# Patient Record
Sex: MALE | Race: WHITE | Employment: OTHER | URBAN - METROPOLITAN AREA
[De-identification: names, ages, dates, MRNs, and addresses within clinical notes are randomized per-mention and may not be internally consistent; named-entity substitution may affect disease eponyms.]

---

## 2017-05-24 ENCOUNTER — OFFICE VISIT (OUTPATIENT)
Dept: CARDIOLOGY CLINIC | Age: 72
End: 2017-05-24

## 2017-05-24 VITALS
DIASTOLIC BLOOD PRESSURE: 52 MMHG | HEIGHT: 67 IN | BODY MASS INDEX: 32.65 KG/M2 | SYSTOLIC BLOOD PRESSURE: 120 MMHG | HEART RATE: 53 BPM | WEIGHT: 208 LBS

## 2017-05-24 DIAGNOSIS — E78.5 HYPERLIPIDEMIA, UNSPECIFIED HYPERLIPIDEMIA TYPE: ICD-10-CM

## 2017-05-24 DIAGNOSIS — G47.33 OSA (OBSTRUCTIVE SLEEP APNEA): ICD-10-CM

## 2017-05-24 DIAGNOSIS — I10 ESSENTIAL HYPERTENSION, BENIGN: ICD-10-CM

## 2017-05-24 DIAGNOSIS — I25.10 ATHEROSCLEROSIS OF NATIVE CORONARY ARTERY OF NATIVE HEART WITHOUT ANGINA PECTORIS: Primary | ICD-10-CM

## 2017-05-24 PROCEDURE — 3017F COLORECTAL CA SCREEN DOC REV: CPT | Performed by: INTERNAL MEDICINE

## 2017-05-24 PROCEDURE — G8417 CALC BMI ABV UP PARAM F/U: HCPCS | Performed by: INTERNAL MEDICINE

## 2017-05-24 PROCEDURE — 4040F PNEUMOC VAC/ADMIN/RCVD: CPT | Performed by: INTERNAL MEDICINE

## 2017-05-24 PROCEDURE — G8598 ASA/ANTIPLAT THER USED: HCPCS | Performed by: INTERNAL MEDICINE

## 2017-05-24 PROCEDURE — 1123F ACP DISCUSS/DSCN MKR DOCD: CPT | Performed by: INTERNAL MEDICINE

## 2017-05-24 PROCEDURE — 99214 OFFICE O/P EST MOD 30 MIN: CPT | Performed by: INTERNAL MEDICINE

## 2017-05-24 PROCEDURE — G8427 DOCREV CUR MEDS BY ELIG CLIN: HCPCS | Performed by: INTERNAL MEDICINE

## 2017-05-24 PROCEDURE — 1036F TOBACCO NON-USER: CPT | Performed by: INTERNAL MEDICINE

## 2017-05-24 RX ORDER — ATORVASTATIN CALCIUM 10 MG/1
10 TABLET, FILM COATED ORAL DAILY
Qty: 30 TABLET | Refills: 11 | Status: SHIPPED | OUTPATIENT
Start: 2017-05-24 | End: 2018-05-30 | Stop reason: SDUPTHER

## 2017-05-24 RX ORDER — TIZANIDINE 4 MG/1
4 TABLET ORAL EVERY 6 HOURS PRN
COMMUNITY
Start: 2017-03-17

## 2017-05-24 RX ORDER — FEXOFENADINE HCL 180 MG/1
180 TABLET ORAL DAILY
COMMUNITY

## 2017-08-04 RX ORDER — TERAZOSIN 1 MG/1
CAPSULE ORAL
Qty: 90 CAPSULE | Refills: 2 | Status: SHIPPED | OUTPATIENT
Start: 2017-08-04 | End: 2018-06-20 | Stop reason: SDUPTHER

## 2018-05-30 RX ORDER — ATORVASTATIN CALCIUM 10 MG/1
TABLET, FILM COATED ORAL
Qty: 30 TABLET | Refills: 1 | Status: SHIPPED | OUTPATIENT
Start: 2018-05-30 | End: 2018-06-28 | Stop reason: SDUPTHER

## 2018-06-22 RX ORDER — TERAZOSIN 1 MG/1
CAPSULE ORAL
Qty: 90 CAPSULE | Refills: 0 | Status: SHIPPED | OUTPATIENT
Start: 2018-06-22 | End: 2022-08-04

## 2018-06-28 ENCOUNTER — OFFICE VISIT (OUTPATIENT)
Dept: CARDIOLOGY CLINIC | Age: 73
End: 2018-06-28

## 2018-06-28 VITALS
BODY MASS INDEX: 34.21 KG/M2 | HEIGHT: 67 IN | SYSTOLIC BLOOD PRESSURE: 132 MMHG | WEIGHT: 218 LBS | HEART RATE: 56 BPM | DIASTOLIC BLOOD PRESSURE: 54 MMHG

## 2018-06-28 DIAGNOSIS — I25.10 ATHEROSCLEROSIS OF NATIVE CORONARY ARTERY OF NATIVE HEART WITHOUT ANGINA PECTORIS: Primary | ICD-10-CM

## 2018-06-28 DIAGNOSIS — E78.5 HYPERLIPIDEMIA, UNSPECIFIED HYPERLIPIDEMIA TYPE: ICD-10-CM

## 2018-06-28 DIAGNOSIS — G47.33 OSA (OBSTRUCTIVE SLEEP APNEA): ICD-10-CM

## 2018-06-28 DIAGNOSIS — I10 ESSENTIAL HYPERTENSION, BENIGN: ICD-10-CM

## 2018-06-28 PROCEDURE — G8598 ASA/ANTIPLAT THER USED: HCPCS | Performed by: INTERNAL MEDICINE

## 2018-06-28 PROCEDURE — 1036F TOBACCO NON-USER: CPT | Performed by: INTERNAL MEDICINE

## 2018-06-28 PROCEDURE — 1123F ACP DISCUSS/DSCN MKR DOCD: CPT | Performed by: INTERNAL MEDICINE

## 2018-06-28 PROCEDURE — 99214 OFFICE O/P EST MOD 30 MIN: CPT | Performed by: INTERNAL MEDICINE

## 2018-06-28 PROCEDURE — 4040F PNEUMOC VAC/ADMIN/RCVD: CPT | Performed by: INTERNAL MEDICINE

## 2018-06-28 PROCEDURE — 3017F COLORECTAL CA SCREEN DOC REV: CPT | Performed by: INTERNAL MEDICINE

## 2018-06-28 PROCEDURE — G8427 DOCREV CUR MEDS BY ELIG CLIN: HCPCS | Performed by: INTERNAL MEDICINE

## 2018-06-28 PROCEDURE — G8417 CALC BMI ABV UP PARAM F/U: HCPCS | Performed by: INTERNAL MEDICINE

## 2018-06-28 RX ORDER — ATORVASTATIN CALCIUM 10 MG/1
TABLET, FILM COATED ORAL
Qty: 90 TABLET | Refills: 3 | Status: SHIPPED | OUTPATIENT
Start: 2018-06-28 | End: 2019-06-28 | Stop reason: SDUPTHER

## 2019-06-28 ENCOUNTER — OFFICE VISIT (OUTPATIENT)
Dept: CARDIOLOGY CLINIC | Age: 74
End: 2019-06-28
Payer: MEDICARE

## 2019-06-28 VITALS
WEIGHT: 230 LBS | HEART RATE: 57 BPM | SYSTOLIC BLOOD PRESSURE: 136 MMHG | HEIGHT: 67 IN | DIASTOLIC BLOOD PRESSURE: 58 MMHG | OXYGEN SATURATION: 93 % | BODY MASS INDEX: 36.1 KG/M2 | RESPIRATION RATE: 18 BRPM

## 2019-06-28 DIAGNOSIS — G47.33 OSA (OBSTRUCTIVE SLEEP APNEA): ICD-10-CM

## 2019-06-28 DIAGNOSIS — E78.5 HYPERLIPIDEMIA, UNSPECIFIED HYPERLIPIDEMIA TYPE: ICD-10-CM

## 2019-06-28 DIAGNOSIS — I10 ESSENTIAL HYPERTENSION, BENIGN: ICD-10-CM

## 2019-06-28 DIAGNOSIS — I25.10 ATHEROSCLEROSIS OF NATIVE CORONARY ARTERY OF NATIVE HEART WITHOUT ANGINA PECTORIS: Primary | ICD-10-CM

## 2019-06-28 PROCEDURE — G8598 ASA/ANTIPLAT THER USED: HCPCS | Performed by: INTERNAL MEDICINE

## 2019-06-28 PROCEDURE — G8427 DOCREV CUR MEDS BY ELIG CLIN: HCPCS | Performed by: INTERNAL MEDICINE

## 2019-06-28 PROCEDURE — 4040F PNEUMOC VAC/ADMIN/RCVD: CPT | Performed by: INTERNAL MEDICINE

## 2019-06-28 PROCEDURE — 1036F TOBACCO NON-USER: CPT | Performed by: INTERNAL MEDICINE

## 2019-06-28 PROCEDURE — 99214 OFFICE O/P EST MOD 30 MIN: CPT | Performed by: INTERNAL MEDICINE

## 2019-06-28 PROCEDURE — 1123F ACP DISCUSS/DSCN MKR DOCD: CPT | Performed by: INTERNAL MEDICINE

## 2019-06-28 PROCEDURE — 3017F COLORECTAL CA SCREEN DOC REV: CPT | Performed by: INTERNAL MEDICINE

## 2019-06-28 PROCEDURE — G8417 CALC BMI ABV UP PARAM F/U: HCPCS | Performed by: INTERNAL MEDICINE

## 2019-06-28 RX ORDER — ATORVASTATIN CALCIUM 10 MG/1
TABLET, FILM COATED ORAL
Qty: 90 TABLET | Refills: 3 | Status: SHIPPED | OUTPATIENT
Start: 2019-06-28 | End: 2020-06-30 | Stop reason: SDUPTHER

## 2019-06-28 NOTE — LETTER
[address] [facility]  [address] [address] [facility] [address]  Phone: [phone]  Fax: [phone]    Jonathan Pearson MD        June 28, 2019     Gomez Lunsford MD  555 [facility], [facility] [address]  [place] [id number]    Patient: Marie Arnett  MR Number: [id number]  YOB: 1945  Date of Visit: 6/28/2019    Dear Dr. Gomez Lunsford:    [place]  Cardiac Follow Up     Referring Provider:  Gomez Lunsford MD     Chief Complaint   Patient presents with    1 Year Follow Up    Coronary Artery Disease    Hypertension        History of Present Illness:    Mr. Delia Lozano is seen today as a routine follow up for management of CAD, hypertension, hyperlipidemia. He also has ODALYS uses CPAP. PCP regularly. He does not smoke. He has increasing dyspnea with exertion. No associated chest pain. He continues to gain weight. Wt increased from 188 to 230 over 3 years. He complains of chronic pain. Says he no longer gets enough pain meds and thus cannot walk for exercise. See pain management. Past Medical History: has a past medical history of Acute MI ([address] Back pain, CAD (coronary artery disease), GERD (gastroesophageal reflux disease), Hyperlipidemia, Hypertension, and Sleep apnea. Surgical History: has a past surgical history that includes Cardiac surgery; back surgery; Hand surgery; Gallbladder surgery; Coronary artery bypass graft; and cyst removal (N/A, 1970). Social History: reports that he quit smoking about 19 years ago. He has never used smokeless tobacco. He reports that he does not drink alcohol or use drugs. Family History:family history includes Cancer in his sister; Heart Attack (age of onset: 72) in his father; Heart Disease in his father and mother; Kidney Disease in his mother. Home Medications:  Prior to Visit Medications    Medication Sig Taking?  Authorizing Provider atorvastatin (LIPITOR) 10 MG tablet TAKE 1 TABLET BY MOUTH ONE TIME A DAY Yes Brianne Portillo MD   terazosin (HYTRIN) 1 MG capsule TAKE 1 CAPSULE BY MOUTH  NIGHTLY Yes NEO Roe CNP   fexofenadine (ALLEGRA) 180 MG tablet Take 180 mg by mouth daily Yes Historical Provider, MD   Cholecalciferol (VITAMIN D3) 5000 UNITS TABS Take 1 tablet by mouth daily Yes Historical Provider, MD   metoprolol tartrate (LOPRESSOR) 25 MG tablet Take 0.5 tablets by mouth 2 times daily Yes NEO Roe CNP   amLODIPine (NORVASC) 10 MG tablet Take 1 tablet by mouth daily Yes Brianne Portillo MD   cloNIDine (CATAPRES) 0.1 MG tablet Take 1 tablet by mouth daily Yes Brianne Portillo MD   magnesium gluconate (MAGONATE) 500 MG tablet Take 2,000 mg by mouth daily  Yes Historical Provider, MD   lisinopril-hydrochlorothiazide (PRINZIDE;ZESTORETIC) 20-12.5 MG per tablet Take 2 tablets by mouth daily  Yes Historical Provider, MD   aspirin  MG EC tablet Take 325 mg by mouth daily  Yes Historical Provider, MD   oxyCODONE (OXY-IR) 30 MG immediate release tablet Take 20 mg by mouth 3 times daily. . Yes Historical Provider, MD   testosterone cypionate (DEPOTESTOTERONE CYPIONATE) 100 MG/ML injection Inject 50 mg into the muscle. Every 3 months. Yes Historical Provider, MD   fish oil-omega-3 fatty acids 1000 MG capsule Take 1 g by mouth 2 times daily. Yes Historical Provider, MD   Glucosamine-Chondroit-Biofl-Mn (GLUCOSAMINE CHONDROITIN COMPLX) CAPS Take 1 capsule by mouth daily. Yes Historical Provider, MD   Omeprazole 20 MG TBEC Take 20 mg by mouth daily. Yes Historical Provider, MD   Coenzyme Q10 (COQ10) 100 MG CAPS Take 200 mg by mouth 3 times daily Indications: 200-500 MG QD  Yes Historical Provider, MD   Multiple Vitamin (MULTIVITAMIN PO) Take 1 tablet by mouth daily.    Yes Historical Provider, MD   Cinnamon 500 MG CAPS Take 2 capsules by mouth as needed  Yes Historical Provider, MD celecoxib (CELEBREX) 200 MG capsule Take 200 mg by mouth Daily. Yes Historical Provider, MD   tiZANidine (ZANAFLEX) 4 MG tablet Take 4 mg by mouth every 6 hours as needed   Historical Provider, MD         Allergies:Carvedilol; Codeine; and Hydralazine     [x] Medications and dosages reviewed. ROS:  [x]Full ROS obtained and negative except as mentioned in HPI      Physical Examination:     /64 (Site: Left Upper Arm, Position: Sitting, Cuff Size: Large Adult)   Pulse 57   Resp 18   Ht 5' 7\" (1.702 m)   Wt 230 lb (104.3 kg)   SpO2 93%   BMI 36.02 kg/m²        · GENERAL: Well developed, well nourished, No acute distress  · NEUROLOGICAL: Alert and oriented  · PSYCH: Calm affect  · SKIN: Warm and dry, no visible rash  · HEENT: Normocephalic, Sclera non-icteric, mucus membranes moist  · NECK: supple, JVP normal  · CAROTID: Normal upstroke, no bruits  · CARDIAC: Normal PMI, Regular rate and rhythm, normal S1S2, No murmur, rub, or gallop  · RESPIRATORY: Normal respiratory effort, Clear to auscultation bilaterally  · EXTREMITIES: No LE edema  · MUSCULOSKELETAL: No joint swelling or tenderness, no chest wall tenderness  · GASTROINTESTINAL: normal bowel sounds, soft, non-tender, no bruit    Assessment:   1. Hyperlipidemia - LDL 73 on lipitor 10, well controlled. Same meds. 2. Hypertension, -Well controlled. Same meds /64 (Site: Left Upper Arm, Position: Sitting, Cuff Size: Large Adult)   Pulse 57   Resp 18   Ht 5' 7\" (1.702 m)   Wt 230 lb (104.3 kg)   SpO2 93%   BMI 36.02 kg/m²       3. Coronary Artery Disease -- Remains stable without angina. 2001 CABG. 2012 Stress myoview: unchanged from previous, Continue medical management   4.   ODALYS- stable, Uses CPAP nightly, Unchanged    Plan:  Stable cardiac status  Refill meds  F/u 1 year  Needs exercise-refuses cardiac rehab-can't afford    Thank you for allowing me to participate in the care of this individual.    Marilee Zapata M.D., [facility] Sincerely,        Sophie Randhawa MD

## 2019-06-28 NOTE — PROGRESS NOTES
tablets by mouth 2 times daily Yes Jyoti Mcnulty, APRN - CNP   amLODIPine (NORVASC) 10 MG tablet Take 1 tablet by mouth daily Yes Jamey Magana MD   cloNIDine (CATAPRES) 0.1 MG tablet Take 1 tablet by mouth daily Yes Jamey Magana MD   magnesium gluconate (MAGONATE) 500 MG tablet Take 2,000 mg by mouth daily  Yes Historical Provider, MD   lisinopril-hydrochlorothiazide (PRINZIDE;ZESTORETIC) 20-12.5 MG per tablet Take 2 tablets by mouth daily  Yes Historical Provider, MD   aspirin  MG EC tablet Take 325 mg by mouth daily  Yes Historical Provider, MD   oxyCODONE (OXY-IR) 30 MG immediate release tablet Take 20 mg by mouth 3 times daily. . Yes Historical Provider, MD   testosterone cypionate (DEPOTESTOTERONE CYPIONATE) 100 MG/ML injection Inject 50 mg into the muscle. Every 3 months. Yes Historical Provider, MD   fish oil-omega-3 fatty acids 1000 MG capsule Take 1 g by mouth 2 times daily. Yes Historical Provider, MD   Glucosamine-Chondroit-Biofl-Mn (GLUCOSAMINE CHONDROITIN COMPLX) CAPS Take 1 capsule by mouth daily. Yes Historical Provider, MD   Omeprazole 20 MG TBEC Take 20 mg by mouth daily. Yes Historical Provider, MD   Coenzyme Q10 (COQ10) 100 MG CAPS Take 200 mg by mouth 3 times daily Indications: 200-500 MG QD  Yes Historical Provider, MD   Multiple Vitamin (MULTIVITAMIN PO) Take 1 tablet by mouth daily. Yes Historical Provider, MD   Cinnamon 500 MG CAPS Take 2 capsules by mouth as needed  Yes Historical Provider, MD   celecoxib (CELEBREX) 200 MG capsule Take 200 mg by mouth Daily. Yes Historical Provider, MD   tiZANidine (ZANAFLEX) 4 MG tablet Take 4 mg by mouth every 6 hours as needed   Historical Provider, MD         Allergies:Carvedilol; Codeine; and Hydralazine     [x] Medications and dosages reviewed.     ROS:  [x]Full ROS obtained and negative except as mentioned in HPI      Physical Examination:     /64 (Site: Left Upper Arm, Position: Sitting, Cuff Size: Large Adult)   Pulse 57 Resp 18   Ht 5' 7\" (1.702 m)   Wt 230 lb (104.3 kg)   SpO2 93%   BMI 36.02 kg/m²       · GENERAL: Well developed, well nourished, No acute distress  · NEUROLOGICAL: Alert and oriented  · PSYCH: Calm affect  · SKIN: Warm and dry, no visible rash  · HEENT: Normocephalic, Sclera non-icteric, mucus membranes moist  · NECK: supple, JVP normal  · CAROTID: Normal upstroke, no bruits  · CARDIAC: Normal PMI, Regular rate and rhythm, normal S1S2, No murmur, rub, or gallop  · RESPIRATORY: Normal respiratory effort, Clear to auscultation bilaterally  · EXTREMITIES: No LE edema  · MUSCULOSKELETAL: No joint swelling or tenderness, no chest wall tenderness  · GASTROINTESTINAL: normal bowel sounds, soft, non-tender, no bruit    Assessment:   1. Hyperlipidemia - LDL 73 on lipitor 10, well controlled. Same meds. 2. Hypertension, -Well controlled. Same meds /64 (Site: Left Upper Arm, Position: Sitting, Cuff Size: Large Adult)   Pulse 57   Resp 18   Ht 5' 7\" (1.702 m)   Wt 230 lb (104.3 kg)   SpO2 93%   BMI 36.02 kg/m²      3. Coronary Artery Disease -- Remains stable without angina. 2001 CABG. 2012 Stress myoview: unchanged from previous, Continue medical management   4.   ODALYS- stable, Uses CPAP nightly, Unchanged    Plan:  Stable cardiac status  Refill meds  F/u 1 year  Needs exercise-refuses cardiac rehab-can't afford    Thank you for allowing me to participate in the care of this individual.    Devora Medellin M.D., [facility]

## 2020-05-05 ENCOUNTER — HOSPITAL ENCOUNTER (OUTPATIENT)
Age: 75
Discharge: HOME | End: 2020-05-05
Payer: MEDICARE

## 2020-05-05 LAB — POTASSIUM SERPL-SCNC: 5.6 MMOL/L (ref 3.5–5.1)

## 2020-05-05 PROCEDURE — 36415 COLL VENOUS BLD VENIPUNCTURE: CPT

## 2020-05-05 PROCEDURE — 84132 ASSAY OF SERUM POTASSIUM: CPT

## 2020-06-04 ENCOUNTER — TELEPHONE (OUTPATIENT)
Dept: CARDIOLOGY CLINIC | Age: 75
End: 2020-06-04

## 2020-06-10 ENCOUNTER — OFFICE VISIT (OUTPATIENT)
Dept: CARDIOLOGY CLINIC | Age: 75
End: 2020-06-10
Payer: MEDICARE

## 2020-06-10 VITALS
SYSTOLIC BLOOD PRESSURE: 138 MMHG | BODY MASS INDEX: 35.85 KG/M2 | HEART RATE: 64 BPM | WEIGHT: 228.4 LBS | DIASTOLIC BLOOD PRESSURE: 58 MMHG | OXYGEN SATURATION: 96 % | HEIGHT: 67 IN | TEMPERATURE: 98.8 F | RESPIRATION RATE: 18 BRPM

## 2020-06-10 PROCEDURE — G8427 DOCREV CUR MEDS BY ELIG CLIN: HCPCS | Performed by: INTERNAL MEDICINE

## 2020-06-10 PROCEDURE — 99214 OFFICE O/P EST MOD 30 MIN: CPT | Performed by: INTERNAL MEDICINE

## 2020-06-10 PROCEDURE — 1036F TOBACCO NON-USER: CPT | Performed by: INTERNAL MEDICINE

## 2020-06-10 PROCEDURE — 3017F COLORECTAL CA SCREEN DOC REV: CPT | Performed by: INTERNAL MEDICINE

## 2020-06-10 PROCEDURE — 4040F PNEUMOC VAC/ADMIN/RCVD: CPT | Performed by: INTERNAL MEDICINE

## 2020-06-10 PROCEDURE — 1123F ACP DISCUSS/DSCN MKR DOCD: CPT | Performed by: INTERNAL MEDICINE

## 2020-06-10 PROCEDURE — G8417 CALC BMI ABV UP PARAM F/U: HCPCS | Performed by: INTERNAL MEDICINE

## 2020-06-10 RX ORDER — ATORVASTATIN CALCIUM 10 MG/1
TABLET, FILM COATED ORAL
Qty: 90 TABLET | Refills: 3 | Status: CANCELLED | OUTPATIENT
Start: 2020-06-10

## 2020-06-10 NOTE — PATIENT INSTRUCTIONS
1.  No change in medication  2. Renée Clement soon  3. Call office with any concerning symptoms  4. Follow up in six months (if stress test is normal)  5. Have Dr. Shania Zarco check potassium level at next visit in July 2020.

## 2020-06-10 NOTE — PROGRESS NOTES
mouth daily Yes Historical Provider, MD   metoprolol tartrate (LOPRESSOR) 25 MG tablet Take 0.5 tablets by mouth 2 times daily Yes Laren Councilman, APRN - CNP   amLODIPine (NORVASC) 10 MG tablet Take 1 tablet by mouth daily Yes Silviano Valderrama MD   cloNIDine (CATAPRES) 0.1 MG tablet Take 1 tablet by mouth daily Yes Silviano Valderrama MD   magnesium gluconate (MAGONATE) 500 MG tablet Take 2,000 mg by mouth daily  Yes Historical Provider, MD   lisinopril-hydrochlorothiazide (PRINZIDE;ZESTORETIC) 20-12.5 MG per tablet Take 2 tablets by mouth daily  Yes Historical Provider, MD   aspirin  MG EC tablet Take 325 mg by mouth daily  Yes Historical Provider, MD   oxyCODONE (OXY-IR) 30 MG immediate release tablet Take 20 mg by mouth 3 times daily. . Yes Historical Provider, MD   testosterone cypionate (DEPOTESTOTERONE CYPIONATE) 100 MG/ML injection Inject 50 mg into the muscle. Every 3 months. Yes Historical Provider, MD   fish oil-omega-3 fatty acids 1000 MG capsule Take 1 g by mouth 2 times daily. Yes Historical Provider, MD   Glucosamine-Chondroit-Biofl-Mn (GLUCOSAMINE CHONDROITIN COMPLX) CAPS Take 1 capsule by mouth daily. Yes Historical Provider, MD   Omeprazole 20 MG TBEC Take 20 mg by mouth daily. Yes Historical Provider, MD   Coenzyme Q10 (COQ10) 100 MG CAPS Take 200 mg by mouth 3 times daily Indications: 200-500 MG QD  Yes Historical Provider, MD   Multiple Vitamin (MULTIVITAMIN PO) Take 1 tablet by mouth daily. Yes Historical Provider, MD   Cinnamon 500 MG CAPS Take 2 capsules by mouth as needed  Yes Historical Provider, MD   celecoxib (CELEBREX) 200 MG capsule Take 200 mg by mouth Daily. Yes Historical Provider, MD   fexofenadine (ALLEGRA) 180 MG tablet Take 180 mg by mouth daily  Historical Provider, MD         Allergies:Carvedilol; Codeine; and Hydralazine     [x] Medications and dosages reviewed.     ROS:  [x]Full ROS obtained and negative except as mentioned in HPI      Physical Examination:     BP (!) 138/58 (Site: Left Upper Arm, Position: Sitting, Cuff Size: Large Adult)   Pulse 64   Temp 98.8 °F (37.1 °C)   Resp 18   Ht 5' 7\" (1.702 m)   Wt 228 lb 6.4 oz (103.6 kg)   SpO2 96%   BMI 35.77 kg/m²       · GENERAL: Well developed, well nourished, No acute distress  · NEUROLOGICAL: Alert and oriented  · PSYCH: Calm affect  · SKIN: Warm and dry, no visible rash  · HEENT: Normocephalic, Sclera non-icteric, mucus membranes moist  · NECK: supple, JVP normal  · CAROTID: Normal upstroke, no bruits  · CARDIAC: Normal PMI, regular rate and rhythm, normal S1S2, no murmur, rub, or gallop  · RESPIRATORY: Normal respiratory effort, Clear to auscultation bilaterally  · EXTREMITIES: No LE edema  · MUSCULOSKELETAL: No joint swelling or tenderness, no chest wall tenderness  · GASTROINTESTINAL: normal bowel sounds, soft, non-tender, no bruit    Assessment:   1. Hyperlipidemia - LDL 75 on lipitor 10, well controlled. Same meds. 2. Hypertension, -Well controlled. Continue current therapy BP (!) 138/58 (Site: Left Upper Arm, Position: Sitting, Cuff Size: Large Adult)   Pulse 64   Temp 98.8 °F (37.1 °C)   Resp 18   Ht 5' 7\" (1.702 m)   Wt 228 lb 6.4 oz (103.6 kg)   SpO2 96%   BMI 35.77 kg/m²      3. Coronary Artery Disease -- 2001 CABG. 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 Inferior HK. Needs repeat prior to surgery   4. ODALYS- stable, Uses CPAP nightly, Unchanged  5. Preop: Needs stress as last 2012-Lexiscan myoview. If unchanged may proceed with moderate risk  6. Hyperkalemia: followed by Dr. Bubba Fraser.  If persists would decreaselisinopril    Plan:  Stress myoview for surgical risk  Repeat K with PCP  F/u me 6 months if stress stable    Thank you for allowing me to participate in the care of this individual.    Julee Hernandez M.D., [facility] [place]

## 2020-06-10 NOTE — LETTER
[facility]  [address] [place] [address]  Phone: [phone]  Fax: [phone]    Kobe Berger MD        Theresa 10, 2020     Twyla Sandoval, [id number] [facility], [address] [place] [address] [place] [address]    Patient: Nikko Shelton  MR Number: [id number]  YOB: 1945  Date of Visit: 6/10/2020    Dear Dr. Twyla Sandoval:    Below are the relevant portions of my assessment and plan of care. [address]  Cardiac Follow Up     Referring Provider:  Twyla Sandoval MD     Chief Complaint   Patient presents with    1 Year Follow Up     No Complaints     Coronary Artery Disease    Hypertension        History of Present Illness:    Mr. Sayra Deleon is seen today for preoperative risk evaluation and follow up for management of CAD, hypertension, hyperlipidemia. He also has ODALYS uses CPAP. He has an increasing PSA and needs biopsy under anesthesia. He is very inactive due to severe chronic pain. Chronic dyspnea with exertion resolves with rest. No associated chest pain. He has recently been found to have issues with hyperkalemia. He was taking OTC med for elevated PSA. Last K better at 5.1. To have recheck with PCP per pt. Past Medical History: has a past medical history of Acute MI ([address] Back pain, CAD (coronary artery disease), GERD (gastroesophageal reflux disease), Hyperlipidemia, Hypertension, and Sleep apnea. Surgical History: has a past surgical history that includes Cardiac surgery; back surgery; Hand surgery; Gallbladder surgery; Coronary artery bypass graft; and cyst removal (N/A, 1970). Social History: reports that he quit smoking about 20 years ago. He has never used smokeless tobacco. He reports that he does not drink alcohol or use drugs. Family History:family history includes Cancer in his sister; Heart Attack (age of onset: 72) in his father; Heart Disease in his father and mother; Kidney Disease in his mother. Home Medications:  Prior to Visit Medications    Medication Sig Taking? Authorizing Provider   atorvastatin (LIPITOR) 10 MG tablet TAKE 1 TABLET BY MOUTH ONE TIME A DAY Yes Ashish Beltran MD   terazosin (HYTRIN) 1 MG capsule TAKE 1 CAPSULE BY MOUTH  NIGHTLY Yes NEO Johnson CNP   tiZANidine (ZANAFLEX) 4 MG tablet Take 4 mg by mouth every 6 hours as needed  Yes Historical Provider, MD   Cholecalciferol (VITAMIN D3) 5000 UNITS TABS Take 1 tablet by mouth daily Yes Historical Provider, MD   metoprolol tartrate (LOPRESSOR) 25 MG tablet Take 0.5 tablets by mouth 2 times daily Yes NEO Johnson CNP   amLODIPine (NORVASC) 10 MG tablet Take 1 tablet by mouth daily Yes Ashish Beltran MD   cloNIDine (CATAPRES) 0.1 MG tablet Take 1 tablet by mouth daily Yes Ashish Beltran MD   magnesium gluconate (MAGONATE) 500 MG tablet Take 2,000 mg by mouth daily  Yes Historical Provider, MD   lisinopril-hydrochlorothiazide (PRINZIDE;ZESTORETIC) 20-12.5 MG per tablet Take 2 tablets by mouth daily  Yes Historical Provider, MD   aspirin  MG EC tablet Take 325 mg by mouth daily  Yes Historical Provider, MD   oxyCODONE (OXY-IR) 30 MG immediate release tablet Take 20 mg by mouth 3 times daily. . Yes Historical Provider, MD   testosterone cypionate (DEPOTESTOTERONE CYPIONATE) 100 MG/ML injection Inject 50 mg into the muscle. Every 3 months. Yes Historical Provider, MD   fish oil-omega-3 fatty acids 1000 MG capsule Take 1 g by mouth 2 times daily. Yes Historical Provider, MD   Glucosamine-Chondroit-Biofl-Mn (GLUCOSAMINE CHONDROITIN COMPLX) CAPS Take 1 capsule by mouth daily. Yes Historical Provider, MD   Omeprazole 20 MG TBEC Take 20 mg by mouth daily. Yes Historical Provider, MD   Coenzyme Q10 (COQ10) 100 MG CAPS Take 200 mg by mouth 3 times daily Indications: 200-500 MG QD  Yes Historical Provider, MD   Multiple Vitamin (MULTIVITAMIN PO) Take 1 tablet by mouth daily.    Yes Historical Provider, MD Cinnamon 500 MG CAPS Take 2 capsules by mouth as needed  Yes Historical Provider, MD   celecoxib (CELEBREX) 200 MG capsule Take 200 mg by mouth Daily. Yes Historical Provider, MD   fexofenadine (ALLEGRA) 180 MG tablet Take 180 mg by mouth daily  Historical Provider, MD         Allergies:Carvedilol; Codeine; and Hydralazine     [x] Medications and dosages reviewed. ROS:  [x]Full ROS obtained and negative except as mentioned in HPI      Physical Examination:     BP (!) 138/58 (Site: Left Upper Arm, Position: Sitting, Cuff Size: Large Adult)   Pulse 64   Temp 98.8 °F (37.1 °C)   Resp 18   Ht 5' 7\" (1.702 m)   Wt 228 lb 6.4 oz (103.6 kg)   SpO2 96%   BMI 35.77 kg/m²        · GENERAL: Well developed, well nourished, No acute distress  · NEUROLOGICAL: Alert and oriented  · PSYCH: Calm affect  · SKIN: Warm and dry, no visible rash  · HEENT: Normocephalic, Sclera non-icteric, mucus membranes moist  · NECK: supple, JVP normal  · CAROTID: Normal upstroke, no bruits  · CARDIAC: Normal PMI, regular rate and rhythm, normal S1S2, no murmur, rub, or gallop  · RESPIRATORY: Normal respiratory effort, Clear to auscultation bilaterally  · EXTREMITIES: No LE edema  · MUSCULOSKELETAL: No joint swelling or tenderness, no chest wall tenderness  · GASTROINTESTINAL: normal bowel sounds, soft, non-tender, no bruit    Assessment:   1. Hyperlipidemia - LDL 75 on lipitor 10, well controlled. Same meds. 2. Hypertension, -Well controlled. Continue current therapy BP (!) 138/58 (Site: Left Upper Arm, Position: Sitting, Cuff Size: Large Adult)   Pulse 64   Temp 98.8 °F (37.1 °C)   Resp 18   Ht 5' 7\" (1.702 m)   Wt 228 lb 6.4 oz (103.6 kg)   SpO2 96%   BMI 35.77 kg/m²       3. Coronary Artery Disease -- 2001 CABG. 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 Inferior HK. Needs repeat prior to surgery   4.   ODALYS- stable, Uses CPAP nightly, Unchanged

## 2020-06-12 ENCOUNTER — HOSPITAL ENCOUNTER (OUTPATIENT)
Dept: NON INVASIVE DIAGNOSTICS | Age: 75
Discharge: HOME | End: 2020-06-12
Payer: MEDICARE

## 2020-06-12 ENCOUNTER — TELEPHONE (OUTPATIENT)
Dept: CARDIOLOGY CLINIC | Age: 75
End: 2020-06-12

## 2020-06-12 LAB
LV EF: 54 %
LVEF MODALITY: NORMAL

## 2020-06-12 PROCEDURE — A9502 TC99M TETROFOSMIN: HCPCS | Performed by: INTERNAL MEDICINE

## 2020-06-12 PROCEDURE — 78452 HT MUSCLE IMAGE SPECT MULT: CPT

## 2020-06-12 PROCEDURE — 3430000000 HC RX DIAGNOSTIC RADIOPHARMACEUTICAL: Performed by: INTERNAL MEDICINE

## 2020-06-12 PROCEDURE — 93017 CV STRESS TEST TRACING ONLY: CPT

## 2020-06-12 PROCEDURE — 2580000003 HC RX 258: Performed by: INTERNAL MEDICINE

## 2020-06-12 PROCEDURE — 6360000002 HC RX W HCPCS: Performed by: INTERNAL MEDICINE

## 2020-06-12 RX ORDER — SODIUM CHLORIDE 0.9 % (FLUSH) 0.9 %
10 SYRINGE (ML) INJECTION PRN
Status: DISCONTINUED | OUTPATIENT
Start: 2020-06-12 | End: 2020-06-13 | Stop reason: HOSPADM

## 2020-06-12 RX ADMIN — TETROFOSMIN 30 MILLICURIE: 1.38 INJECTION, POWDER, LYOPHILIZED, FOR SOLUTION INTRAVENOUS at 14:00

## 2020-06-12 RX ADMIN — REGADENOSON 0.4 MG: 0.08 INJECTION, SOLUTION INTRAVENOUS at 14:18

## 2020-06-12 RX ADMIN — TETROFOSMIN 10 MILLICURIE: 1.38 INJECTION, POWDER, LYOPHILIZED, FOR SOLUTION INTRAVENOUS at 12:43

## 2020-06-12 RX ADMIN — Medication 10 ML: at 14:00

## 2020-06-12 RX ADMIN — Medication 10 ML: at 12:54

## 2020-06-15 ENCOUNTER — TELEPHONE (OUTPATIENT)
Dept: CARDIOLOGY CLINIC | Age: 75
End: 2020-06-15

## 2020-06-22 ENCOUNTER — TELEPHONE (OUTPATIENT)
Dept: CARDIOLOGY CLINIC | Age: 75
End: 2020-06-22

## 2020-06-22 NOTE — TELEPHONE ENCOUNTER
Dr Sharma Situ calling for [facility] (Knows he is OOT) not urgent. Pt has low grade prostate cancer however pt may proceed with cath from his standpoint. May treat like any other pt. He will be faxing over the pathology report and summary of visit.  Pls call to advise Thank you

## 2020-06-30 ENCOUNTER — HOSPITAL ENCOUNTER (OUTPATIENT)
Age: 75
Discharge: HOME | End: 2020-06-30
Payer: MEDICARE

## 2020-06-30 ENCOUNTER — OFFICE VISIT (OUTPATIENT)
Dept: CARDIOLOGY CLINIC | Age: 75
End: 2020-06-30
Payer: MEDICARE

## 2020-06-30 VITALS
WEIGHT: 224 LBS | HEART RATE: 62 BPM | BODY MASS INDEX: 35.16 KG/M2 | OXYGEN SATURATION: 94 % | DIASTOLIC BLOOD PRESSURE: 60 MMHG | HEIGHT: 67 IN | SYSTOLIC BLOOD PRESSURE: 152 MMHG

## 2020-06-30 PROBLEM — E87.5 HYPERKALEMIA: Status: ACTIVE | Noted: 2020-06-30

## 2020-06-30 LAB — POTASSIUM SERPL-SCNC: 5.5 MMOL/L (ref 3.5–5.1)

## 2020-06-30 PROCEDURE — 84132 ASSAY OF SERUM POTASSIUM: CPT

## 2020-06-30 PROCEDURE — 1123F ACP DISCUSS/DSCN MKR DOCD: CPT | Performed by: INTERNAL MEDICINE

## 2020-06-30 PROCEDURE — G8417 CALC BMI ABV UP PARAM F/U: HCPCS | Performed by: INTERNAL MEDICINE

## 2020-06-30 PROCEDURE — 4040F PNEUMOC VAC/ADMIN/RCVD: CPT | Performed by: INTERNAL MEDICINE

## 2020-06-30 PROCEDURE — 36415 COLL VENOUS BLD VENIPUNCTURE: CPT

## 2020-06-30 PROCEDURE — 1036F TOBACCO NON-USER: CPT | Performed by: INTERNAL MEDICINE

## 2020-06-30 PROCEDURE — 3017F COLORECTAL CA SCREEN DOC REV: CPT | Performed by: INTERNAL MEDICINE

## 2020-06-30 PROCEDURE — 99215 OFFICE O/P EST HI 40 MIN: CPT | Performed by: INTERNAL MEDICINE

## 2020-06-30 PROCEDURE — G8427 DOCREV CUR MEDS BY ELIG CLIN: HCPCS | Performed by: INTERNAL MEDICINE

## 2020-06-30 RX ORDER — ATORVASTATIN CALCIUM 10 MG/1
TABLET, FILM COATED ORAL
Qty: 90 TABLET | Refills: 4 | Status: SHIPPED | OUTPATIENT
Start: 2020-06-30 | End: 2021-10-01 | Stop reason: SDUPTHER

## 2020-07-02 ENCOUNTER — TELEPHONE (OUTPATIENT)
Dept: CARDIOLOGY CLINIC | Age: 75
End: 2020-07-02

## 2020-07-02 ENCOUNTER — OFFICE VISIT (OUTPATIENT)
Dept: PRIMARY CARE CLINIC | Age: 75
End: 2020-07-02
Payer: MEDICARE

## 2020-07-02 PROCEDURE — G8417 CALC BMI ABV UP PARAM F/U: HCPCS | Performed by: NURSE PRACTITIONER

## 2020-07-02 PROCEDURE — 99211 OFF/OP EST MAY X REQ PHY/QHP: CPT | Performed by: NURSE PRACTITIONER

## 2020-07-02 PROCEDURE — G8428 CUR MEDS NOT DOCUMENT: HCPCS | Performed by: NURSE PRACTITIONER

## 2020-07-02 NOTE — PATIENT INSTRUCTIONS
Steps to help prevent the spread of COVID-19 if you are sick  SOURCE - https://parker-ingram.info/. html     Stay home except to get medical care   ; Stay home: People who are mildly ill with COVID-19 are able to isolate at home during their illness. You should restrict activities outside your home, except for getting medical care.   ; Avoid public areas: Do not go to work, school, or public areas.   ; Avoid public transportation: Avoid using public transportation, ride-sharing, or taxis.  ; Separate yourself from other people and animals in your home   ; Stay away from others: As much as possible, you should stay in a specific room and away from other people in your home. Also, you should use a separate bathroom, if available.   ; Limit contact with pets & animals: You should restrict contact with pets and other animals while you are sick with COVID-19, just like you would around other people. Although there have not been reports of pets or other animals becoming sick with COVID-19, it is still recommended that people sick with COVID-19 limit contact with animals until more information is known about the virus. ; When possible, have another member of your household care for your animals while you are sick. If you are sick with COVID-19, avoid contact with your pet, including petting, snuggling, being kissed or licked, and sharing food. If you must care for your pet or be around animals while you are sick, wash your hands before and after you interact with pets and wear a facemask. See COVID-19 and Animals for more information. Other considerations   The ill person should eat/be fed in their room if possible. Non-disposable  items used should be handled with gloves and washed with hot water or in a . Clean hands after handling used  items.  If possible, dedicate a lined trash can for the ill person.  Use gloves when removing garbage hands are visibly dirty.   ; Avoid touching: Avoid touching your eyes, nose, and mouth with unwashed hands. Handwashing Tips   ; Wet your hands with clean, running water (warm or cold), turn off the tap, and apply soap.  ; Lather your hands by rubbing them together with the soap. Lather the backs of your hands, between your fingers, and under your nails. ; Scrub your hands for at least 20 seconds. Need a timer? Hum the [place] from beginning to end twice.  ; Rinse your hands well under clean, running water.  ; Dry your hands using a clean towel or air dry them. Avoid sharing personal household items   ; Do not share: You should not share dishes, drinking glasses, cups, eating utensils, towels, or bedding with other people or pets in your home.   ; Wash thoroughly after use: After using these items, they should be washed thoroughly with soap and water. Clean all high-touch surfaces everyday   ; Clean and disinfect: Practice routine cleaning of high touch surfaces.  ; High touch surfaces include counters, tabletops, doorknobs, bathroom fixtures, toilets, phones, keyboards, tablets, and bedside tables.  ; Disinfect areas with bodily fluids: Also, clean any surfaces that may have blood, stool, or body fluids on them.   ; Household : Use a household cleaning spray or wipe, according to the label instructions. Labels contain instructions for safe and effective use of the cleaning product including precautions you should take when applying the product, such as wearing gloves and making sure you have good ventilation during use of the product.     Monitor your symptoms   Seek medical attention: Seek prompt medical attention if your illness is worsening     (e.g., difficulty breathing).   ; Call your doctor: Before seeking care, call your healthcare provider and tell them that you have, or are being evaluated for, COVID-19.   ; Wear a facemask when sick: Put on a facemask before you enter the facility. These steps will help the healthcare provider's office to keep other people in the office or waiting room from getting infected or exposed. ; Alert health department: Ask your healthcare provider to call the local or state health department. Persons who are placed under active monitoring or facilitated self-monitoring should follow instructions provided by their local health department or occupational health professionals, as appropriate.  ; Call 911 if you have a medical emergency: If you have a medical emergency and need to call 911, notify the dispatch personnel that you have, or are being evaluated for COVID-19. If possible, put on a facemask before emergency medical services arrive. Thank you for enrolling in [address] [facility]. Please follow the instructions below to securely access your online medical record. [facility] allows you to send messages to your doctor, view your test results, renew your prescriptions, schedule appointments, and more. How Do I Sign Up? 1. In your Internet browser, go to [email or web address]  2. Click on the Sign Up Now link in the Sign In box. You will see the New Member Sign Up page. 3. Enter your [employer] Access Code exactly as it appears below. You will not need to use this code after youve completed the sign-up process. If you do not sign up before the expiration date, you must request a new code. MyChart Access Code: Activation code not generated  Current [facility] Status: Patient Declined    4. Enter your Social Security Number ([id number]) and Date of Birth (mm/dd/yyyy) as indicated and click Submit. You will be taken to the next sign-up page. 5. Create a [employer] ID. This will be your [facility] login ID and cannot be changed, so think of one that is secure and easy to remember. 6. Create a [employer] password. You can change your password at any time. 7. Enter your Password Reset Question and Answer.  This can be used at a later time if you forget your password. 8. Enter your e-mail address. You will receive e-mail notification when new information is available in [address] 9. Click Sign Up. You can now view your medical record. Additional Information  If you have questions, please contact your physician practice where you receive care. Remember, MyChart is NOT to be used for urgent needs. For medical emergencies, dial 911.

## 2020-07-02 NOTE — TELEPHONE ENCOUNTER
Spoke to patient about [facility] 7/8/20. He mentioned that his potassium level has been high and Dr Quiros [place] ordered him kayexelate to take. He doesn't understand why. He has been on same meds and has eaten same foods for years. Discussed high potassium foods to stay away from. He is wanting to know what MMK thinks the cause of this all of a sudden is.  Reviewed old labs with him that showed even 2 years ago his potassium was always at the upper limit of normal.

## 2020-07-05 LAB
SARS-COV-2: NOT DETECTED
SOURCE: NORMAL

## 2020-07-06 NOTE — TELEPHONE ENCOUNTER
Per MMK- Hyperkalemia may be from Lisinopril. We could consider increasing the HCTZ to 25 mg. Wouldn't want him to start that until after his [facility] procedure Wed. LMOM for patient to call back to discuss.

## 2020-07-07 NOTE — TELEPHONE ENCOUNTER
Noted. 1 [facility] will be doing [facility] tomorrow and will discuss with him.

## 2020-07-08 ENCOUNTER — HOSPITAL ENCOUNTER (OUTPATIENT)
Dept: CARDIAC CATH/INVASIVE PROCEDURES | Age: 75
Discharge: HOME | End: 2020-07-08
Attending: INTERNAL MEDICINE | Admitting: INTERNAL MEDICINE
Payer: MEDICARE

## 2020-07-08 VITALS
HEART RATE: 64 BPM | BODY MASS INDEX: 35.16 KG/M2 | OXYGEN SATURATION: 99 % | DIASTOLIC BLOOD PRESSURE: 63 MMHG | WEIGHT: 224 LBS | TEMPERATURE: 98 F | HEIGHT: 67 IN | SYSTOLIC BLOOD PRESSURE: 175 MMHG

## 2020-07-08 LAB
ANION GAP SERPL CALCULATED.3IONS-SCNC: 11 MMOL/L (ref 3–16)
BUN BLDV-MCNC: 25 MG/DL (ref 7–20)
CALCIUM SERPL-MCNC: 9.5 MG/DL (ref 8.3–10.6)
CHLORIDE BLD-SCNC: 103 MMOL/L (ref 99–110)
CO2: 27 MMOL/L (ref 21–32)
CREAT SERPL-MCNC: 1 MG/DL (ref 0.8–1.3)
EKG ATRIAL RATE: 64 BPM
EKG DIAGNOSIS: NORMAL
EKG P AXIS: 61 DEGREES
EKG P-R INTERVAL: 152 MS
EKG Q-T INTERVAL: 402 MS
EKG QRS DURATION: 86 MS
EKG QTC CALCULATION (BAZETT): 414 MS
EKG R AXIS: 29 DEGREES
EKG T AXIS: 74 DEGREES
EKG VENTRICULAR RATE: 64 BPM
GFR AFRICAN AMERICAN: >60
GFR NON-AFRICAN AMERICAN: >60
GLUCOSE BLD-MCNC: 143 MG/DL (ref 70–99)
HCT VFR BLD CALC: 40.5 % (ref 40.5–52.5)
HEMOGLOBIN: 13.6 G/DL (ref 13.5–17.5)
LEFT VENTRICULAR EJECTION FRACTION MODE: NORMAL
LV EF: 50 %
MCH RBC QN AUTO: 30.7 PG (ref 26–34)
MCHC RBC AUTO-ENTMCNC: 33.6 G/DL (ref 31–36)
MCV RBC AUTO: 91.3 FL (ref 80–100)
PDW BLD-RTO: 13.8 % (ref 12.4–15.4)
PLATELET # BLD: 174 K/UL (ref 135–450)
PMV BLD AUTO: 8.1 FL (ref 5–10.5)
POTASSIUM SERPL-SCNC: 4.3 MMOL/L (ref 3.5–5.1)
RBC # BLD: 4.44 M/UL (ref 4.2–5.9)
SODIUM BLD-SCNC: 141 MMOL/L (ref 136–145)
WBC # BLD: 6 K/UL (ref 4–11)

## 2020-07-08 PROCEDURE — 93005 ELECTROCARDIOGRAM TRACING: CPT

## 2020-07-08 PROCEDURE — 85027 COMPLETE CBC AUTOMATED: CPT

## 2020-07-08 PROCEDURE — 80048 BASIC METABOLIC PNL TOTAL CA: CPT

## 2020-07-08 PROCEDURE — 93005 ELECTROCARDIOGRAM TRACING: CPT | Performed by: INTERNAL MEDICINE

## 2020-07-08 PROCEDURE — 93459 L HRT ART/GRFT ANGIO: CPT

## 2020-07-08 PROCEDURE — 99152 MOD SED SAME PHYS/QHP 5/>YRS: CPT

## 2020-07-08 PROCEDURE — 6360000002 HC RX W HCPCS

## 2020-07-08 PROCEDURE — 2709999900 HC NON-CHARGEABLE SUPPLY

## 2020-07-08 PROCEDURE — C1894 INTRO/SHEATH, NON-LASER: HCPCS

## 2020-07-08 PROCEDURE — C1769 GUIDE WIRE: HCPCS

## 2020-07-08 PROCEDURE — 93459 L HRT ART/GRFT ANGIO: CPT | Performed by: INTERNAL MEDICINE

## 2020-07-08 PROCEDURE — 99153 MOD SED SAME PHYS/QHP EA: CPT

## 2020-07-08 PROCEDURE — 93010 ELECTROCARDIOGRAM REPORT: CPT | Performed by: INTERNAL MEDICINE

## 2020-07-08 PROCEDURE — 36415 COLL VENOUS BLD VENIPUNCTURE: CPT

## 2020-07-08 PROCEDURE — 99152 MOD SED SAME PHYS/QHP 5/>YRS: CPT | Performed by: INTERNAL MEDICINE

## 2020-07-08 PROCEDURE — 6360000004 HC RX CONTRAST MEDICATION: Performed by: INTERNAL MEDICINE

## 2020-07-08 PROCEDURE — 2500000003 HC RX 250 WO HCPCS

## 2020-07-08 RX ADMIN — IOPAMIDOL 105 ML: 755 INJECTION, SOLUTION INTRAVENOUS at 14:53

## 2020-07-08 NOTE — OP NOTE
Cardiac Cath    Indication: Unstable angina with abnormal stress    EBL- 10 cc    Start Time: 13:51  Stop time: 14:24  Versed-4  Fentanyl-200  Contrast-105  Fluro: 4.6      Findings:     LM-30%  LAD-prox 30%, mid 50%, competitive filling filling after mid vessel. LCX-occluded after OM1, Small caliber OM1 with 70% mid vessel.   RCA-Occluded mid vessel  LV-mid anterior-lateral HK, EF=50%  LVEDP=10      LIMA=>LAD patent  SVG=>RCA patent  Radial=>OM Patent    IMP:  3 vessel CAD with patent grafts and mild LV dysfunction  Lateral ischemia seen on stress possibley due to OM1  Vessel is small and he has limited symptoms so will treat medically    PLAN:    Continue medical therapy  F/u 6 months

## 2020-07-08 NOTE — H&P
[facility]  Cardiac Follow Up     Referring Provider:  Jamshid Polo MD          History of Present Illness:    Mr. Ana Medrano is seen today for preoperative risk evaluation and follow up for management of CAD, hypertension, hyperlipidemia. He also has ODALYS uses CPAP. He had an increasing PSA underwent biopsy with Dr. Troy Helms. He is being treated medically. He had a preoperative stress showing lateral ischemia. The plan was to perform a cardiac cath after the prostate biopsy and he is here to discuss. He is doing well. Chronic dyspnea with exertion but no chest pain. He is tolerating meds including statin. He does not remember cath prior to CABG. Past Medical History: has a past medical history of Acute MI ([address] Back pain, CAD (coronary artery disease), GERD (gastroesophageal reflux disease), Hyperlipidemia, Hypertension, and Sleep apnea. Surgical History: has a past surgical history that includes Cardiac surgery; back surgery; Hand surgery; Gallbladder surgery; Coronary artery bypass graft; and cyst removal (N/A, 1970). Social History: reports that he quit smoking about 20 years ago. He has never used smokeless tobacco. He reports that he does not drink alcohol or use drugs. Family History:family history includes Cancer in his sister; Heart Attack (age of onset: 72) in his father; Heart Disease in his father and mother; Kidney Disease in his mother. Home Medications:  Prior to Visit Medications    Medication Sig Taking?  Authorizing Provider   atorvastatin (LIPITOR) 10 MG tablet TAKE 1 TABLET BY MOUTH ONE TIME A DAY Yes Megan Elizabeth MD   terazosin (HYTRIN) 1 MG capsule TAKE 1 CAPSULE BY MOUTH  NIGHTLY Yes NEO Khanna CNP   tiZANidine (ZANAFLEX) 4 MG tablet Take 4 mg by mouth every 6 hours as needed  Yes Historical Provider, MD   fexofenadine (ALLEGRA) 180 MG tablet Take 180 mg by mouth daily Yes Historical Provider, MD   Cholecalciferol (VITAMIN D3) 5000 UNITS TABS Take 1 tablet by mouth daily Yes Historical Provider, MD   metoprolol tartrate (LOPRESSOR) 25 MG tablet Take 0.5 tablets by mouth 2 times daily Yes Harlee Skiff, APRN - CNP   amLODIPine (NORVASC) 10 MG tablet Take 1 tablet by mouth daily Yes Amy Naylor MD   cloNIDine (CATAPRES) 0.1 MG tablet Take 1 tablet by mouth daily  Patient taking differently: Take 0.1 mg by mouth 2 times daily  Yes Amy Naylor MD   magnesium gluconate (MAGONATE) 500 MG tablet Take 2,000 mg by mouth daily  Yes Historical Provider, MD   lisinopril-hydrochlorothiazide (PRINZIDE;ZESTORETIC) 20-12.5 MG per tablet Take 2 tablets by mouth daily  Yes Historical Provider, MD   aspirin  MG EC tablet Take 325 mg by mouth daily  Yes Historical Provider, MD   oxyCODONE (OXY-IR) 30 MG immediate release tablet Take 15 mg by mouth 3 times daily. Yes Historical Provider, MD   fish oil-omega-3 fatty acids 1000 MG capsule Take 1 g by mouth 2 times daily. Yes Historical Provider, MD   Glucosamine-Chondroit-Biofl-Mn (GLUCOSAMINE CHONDROITIN COMPLX) CAPS Take 1 capsule by mouth daily. Yes Historical Provider, MD   Omeprazole 20 MG TBEC Take 20 mg by mouth daily. Yes Historical Provider, MD   Coenzyme Q10 (COQ10) 100 MG CAPS Take 200 mg by mouth 3 times daily Indications: 200-500 MG QD  Yes Historical Provider, MD   Multiple Vitamin (MULTIVITAMIN PO) Take 1 tablet by mouth daily. Yes Historical Provider, MD   Cinnamon 500 MG CAPS Take 2 capsules by mouth as needed  Yes Historical Provider, MD   celecoxib (CELEBREX) 200 MG capsule Take 200 mg by mouth Daily. Yes Historical Provider, MD   testosterone cypionate (DEPOTESTOTERONE CYPIONATE) 100 MG/ML injection Inject 50 mg into the muscle. Every 3 months. Historical Provider, MD         Allergies:Carvedilol; Codeine; and Hydralazine     [x] Medications and dosages reviewed.     ROS:  [x]Full ROS obtained and negative except as mentioned in HPI      Physical Examination:     BP (!) 162/70 (Site: Left Upper Arm, Position: Sitting, Cuff Size: Medium Adult)   Pulse 62   Ht 5' 7\" (1.702 m)   Wt 224 lb (101.6 kg)   SpO2 94%   BMI 35.08 kg/m²       · GENERAL: Well developed, well nourished, No acute distress  · NEUROLOGICAL: Alert and oriented  · PSYCH: Calm affect  · SKIN: Warm and dry, no visible rash  · HEENT: Normocephalic, Sclera non-icteric, mucus membranes moist  · NECK: supple, JVP normal  · CAROTID: Normal upstroke, no bruits  · CARDIAC: Normal PMI, regular rate and rhythm, normal S1S2, No murmur, rub, or gallop  · RESPIRATORY: Normal respiratory effort, clear to auscultation bilaterally  · EXTREMITIES: No LE edema  · MUSCULOSKELETAL: No joint swelling or tenderness, no chest wall tenderness  · GASTROINTESTINAL: normal bowel sounds, soft, non-tender, no bruit    Assessment:   1. Hyperlipidemia - LDL 75 on lipitor 10, well controlled. Continue current therapy     2. Hypertension, -Has been controlled. High today. Follow for now on  current therapy BP (!) 162/70 (Site: Left Upper Arm, Position: Sitting, Cuff Size: Medium Adult)   Pulse 62   Ht 5' 7\" (1.702 m)   Wt 224 lb (101.6 kg)   SpO2 94%   BMI 35.08 kg/m²      3. Coronary Artery Disease -- 2001 CABG. Myoview with lateral ischemia. Recommend repeat cath   4. ODALYS- stable, Uses CPAP nightly, Unchanged  5. Hyperkalemia: followed by Dr. Abi Baumann. improved. Follow    Plan:  Cardiac cath/possible PCI  Risks, including but not limited to, death, MI, CVA, vascular complication, renal impairment, drug or contrast reaction discussed. The pt understands, and wishes to proceed.     (LIMA=>LAD, Radial=>OM, SVG=>RCA)    Thank you for allowing me to participate in the care of this individual.    Bri Rasmussen M.D., [address] [facility]

## 2020-07-08 NOTE — PLAN OF CARE
Brief Pre-Op Note/Sedation Assessment      Wale Delgado  1945  Cath/NONE      [id number]  1:02 PM    Planned Procedure: Cardiac Catheterization Procedure    Post Procedure Plan: Return to same level of care    Consent: I have discussed with the patient and/or the patient representative the indication, alternatives, and the possible risks and/or complications of the planned procedure and the anesthesia methods. The patient and/or patient representative appear to understand and agree to proceed. Chief Complaint: Anginal Equivalent      Indications for Cath Procedure: Worsening Angina  Anginal Classification within 2 weeks:  CCS III - Symptoms with everyday living activities, i.e., moderate limitation  NYHA Heart Failure Class within 2 weeks: Class II - Symptoms of HF on ordinary exertion, Newly Diagnosed? No, Heart Failure Type: Diastolic  Is Cath Lab Visit Valve-related?: no  Surgical Risk: N/A  Functional Type: < 4 METS    Anti- Anginal Meds within 2 weeks:   Yes: Beta Blockers, Ca Channel Blockers, Aspirin and Statin (Any)    Stress or Imaging Studies Performed:  Stress Test with SPECT Result: Positive:  lateral Risk/Extent of Ischemia:  Intermediate     Vital Signs:  BP (!) 175/63   Pulse 64   Temp 98 °F (36.7 °C) (Temporal)   Ht 5' 7\" (1.702 m)   Wt 224 lb (101.6 kg)   SpO2 99%   BMI 35.08 kg/m²     Allergies:   Allergies   Allergen Reactions    Carvedilol      intolerant    Codeine      Large doses cause dizziness    Hydralazine        Past Medical History:  Past Medical History:   Diagnosis Date    Acute MI ([address]     Back pain     CAD (coronary artery disease)     GERD (gastroesophageal reflux disease)     Hyperlipidemia     Hypertension     Sleep apnea          Surgical History:  Past Surgical History:   Procedure Laterality Date    BACK SURGERY      x2    CARDIAC SURGERY      3v--2001    CORONARY ARTERY BYPASS GRAFT      CYST REMOVAL N/A 1970    ON BASE OF SPINE    GALLBLADDER SURGERY      HAND SURGERY      left hand deep cut ? tendon repair         Medications:  No current facility-administered medications for this encounter. Pre-Sedation:    Pre-Sedation Documentation and Exam:  I have personally completed a history, physical exam & review of systems for this patient (see notes). Prior History of Anesthesia Complications:   none    Modified Mallampati:  II (soft palate, uvula, fauces visible)    ASA Classification:  Class 2 - A normal healthy patient with mild systemic disease      Sterling Scale: Activity:  2 - Able to move 4 extremities voluntarily on command  Respiration:  2 - Able to breathe deeply and cough freely  Circulation:  2 - BP+/- 20mmHg of normal  Consciousness:  2 - Fully awake  Oxygen Saturation (color):  2 - Able to maintain oxygen saturation >92% on room air    Sedation/Anesthesia Plan:  Guard the patient's safety and welfare. Minimize physical discomfort and pain. Minimize negative psychological responses to treatment by providing sedation and analgesia and maximize the potential amnesia. Patient to meet pre-procedure discharge plan.     Medication Planned:  midazolam intravenously and fentanyl intravenously    Patient is an appropriate candidate for plan of sedation: yes      Electronically signed by Mali Beckman MD on 7/8/2020 at 1:02 PM

## 2020-07-09 ENCOUNTER — HOSPITAL ENCOUNTER (OUTPATIENT)
Age: 75
Discharge: HOME | End: 2020-07-09
Payer: MEDICARE

## 2020-07-09 ENCOUNTER — HOSPITAL ENCOUNTER (OUTPATIENT)
Dept: GENERAL RADIOLOGY | Age: 75
Discharge: HOME | End: 2020-07-09
Payer: MEDICARE

## 2020-07-09 PROCEDURE — 72100 X-RAY EXAM L-S SPINE 2/3 VWS: CPT

## 2020-07-28 ENCOUNTER — TELEPHONE (OUTPATIENT)
Dept: CARDIOLOGY CLINIC | Age: 75
End: 2020-07-28

## 2020-07-28 NOTE — TELEPHONE ENCOUNTER
Called pt, gave instructions per MMK. Will repeat BMP tomorrow. He is going to go to [facility].

## 2020-07-28 NOTE — TELEPHONE ENCOUNTER
Pt calling his potassium is running high 58. Needs to know what to do? Does he need an appt?  Pls call to advise Thank you

## 2020-07-29 ENCOUNTER — HOSPITAL ENCOUNTER (OUTPATIENT)
Age: 75
Discharge: HOME | End: 2020-07-29
Payer: MEDICARE

## 2020-07-29 LAB
ANION GAP SERPL CALCULATED.3IONS-SCNC: 19 MMOL/L (ref 3–16)
BUN BLDV-MCNC: 21 MG/DL (ref 7–20)
CALCIUM SERPL-MCNC: 9.4 MG/DL (ref 8.3–10.6)
CHLORIDE BLD-SCNC: 102 MMOL/L (ref 99–110)
CO2: 22 MMOL/L (ref 21–32)
CREAT SERPL-MCNC: 1.1 MG/DL (ref 0.8–1.3)
GFR AFRICAN AMERICAN: >60
GFR NON-AFRICAN AMERICAN: >60
GLUCOSE BLD-MCNC: 128 MG/DL (ref 70–99)
POTASSIUM SERPL-SCNC: 4.4 MMOL/L (ref 3.5–5.1)
SODIUM BLD-SCNC: 143 MMOL/L (ref 136–145)

## 2020-07-29 PROCEDURE — 36415 COLL VENOUS BLD VENIPUNCTURE: CPT

## 2020-07-29 PROCEDURE — 80048 BASIC METABOLIC PNL TOTAL CA: CPT

## 2020-07-30 ENCOUNTER — TELEPHONE (OUTPATIENT)
Dept: CARDIOLOGY CLINIC | Age: 75
End: 2020-07-30

## 2020-07-30 NOTE — TELEPHONE ENCOUNTER
Spoke with patient. He wanted MMK to know that Dr Veda Liu ordered kayexalate for him to take.  He took 120 gm 2 days ago (prior to repeat labs)

## 2020-07-30 NOTE — TELEPHONE ENCOUNTER
----- Message from Kayla Kuhn MD sent at 7/30/2020  9:41 AM EDT -----  Potassium normal. Go back to lisinopril once a day. AVOID HIGH K+ FOODS OR EXTRA POTASSIUM!!   Repeat chem in 2 weeks    MMK

## 2020-08-03 ENCOUNTER — HOSPITAL ENCOUNTER (OUTPATIENT)
Age: 75
Discharge: HOME | End: 2020-08-03
Payer: MEDICARE

## 2020-08-03 LAB — POTASSIUM SERPL-SCNC: 5.8 MMOL/L (ref 3.5–5.1)

## 2020-08-03 PROCEDURE — 36415 COLL VENOUS BLD VENIPUNCTURE: CPT

## 2020-08-03 PROCEDURE — 84132 ASSAY OF SERUM POTASSIUM: CPT

## 2020-10-06 ENCOUNTER — HOSPITAL ENCOUNTER (OUTPATIENT)
Dept: MRI IMAGING | Age: 75
Discharge: HOME | End: 2020-10-06
Payer: MEDICARE

## 2020-10-06 PROCEDURE — 73721 MRI JNT OF LWR EXTRE W/O DYE: CPT

## 2021-02-08 ENCOUNTER — TELEPHONE (OUTPATIENT)
Dept: CARDIOLOGY CLINIC | Age: 76
End: 2021-02-08

## 2021-02-08 NOTE — TELEPHONE ENCOUNTER
Patient called. He continues to have SOB and high BP. Dr Ian Laguna told him to continue to take AHydralazine. He is due for a fu with Dr Sheela Guajardo. He is at the grocery and has asked if I can call him back tomorrow to make fu appt when he is at home.

## 2021-02-08 NOTE — TELEPHONE ENCOUNTER
Pt states PCP Dr Tyler Narvaez had pt take hydralazine 50 mg for his BP, but pt states it caused him Sob and increased bp to 199/60. Pt's BP last night was  170/60.     last week pt took a topical for toothache medication and he started shivering and went to bed, states he lost time and woke up 14 hrs later his c pap was on the floor and he had threw up. Please call pt to advise.

## 2021-02-08 NOTE — TELEPHONE ENCOUNTER
Called pt about the message below patient was told that he needed to call his PCP about the medication because he is the one that gave it to him. Patient stated that he called him and he told him that he still needed to take the medication. Patient stated that he is not sure why he lost 14 hours and don't remember. He also stated that he has never used the tooth medication before.  Please advise, Thank you

## 2021-02-26 ENCOUNTER — HOSPITAL ENCOUNTER (OUTPATIENT)
Dept: NON INVASIVE DIAGNOSTICS | Age: 76
Discharge: HOME | End: 2021-02-26
Payer: MEDICARE

## 2021-02-26 LAB
LV EF: 55 %
LVEF MODALITY: NORMAL

## 2021-02-26 PROCEDURE — 93306 TTE W/DOPPLER COMPLETE: CPT

## 2021-03-01 ENCOUNTER — HOSPITAL ENCOUNTER (OUTPATIENT)
Age: 76
Discharge: HOME | End: 2021-03-01
Payer: MEDICARE

## 2021-03-01 ENCOUNTER — OFFICE VISIT (OUTPATIENT)
Dept: CARDIOLOGY CLINIC | Age: 76
End: 2021-03-01
Payer: MEDICARE

## 2021-03-01 VITALS
BODY MASS INDEX: 35.63 KG/M2 | HEIGHT: 67 IN | DIASTOLIC BLOOD PRESSURE: 60 MMHG | WEIGHT: 227 LBS | SYSTOLIC BLOOD PRESSURE: 180 MMHG | HEART RATE: 70 BPM | OXYGEN SATURATION: 96 % | RESPIRATION RATE: 20 BRPM

## 2021-03-01 DIAGNOSIS — E78.5 HYPERLIPIDEMIA, UNSPECIFIED HYPERLIPIDEMIA TYPE: ICD-10-CM

## 2021-03-01 DIAGNOSIS — I10 ESSENTIAL HYPERTENSION, BENIGN: ICD-10-CM

## 2021-03-01 DIAGNOSIS — G47.33 OSA (OBSTRUCTIVE SLEEP APNEA): ICD-10-CM

## 2021-03-01 DIAGNOSIS — E87.5 HYPERKALEMIA: ICD-10-CM

## 2021-03-01 DIAGNOSIS — I25.10 ATHEROSCLEROSIS OF NATIVE CORONARY ARTERY OF NATIVE HEART WITHOUT ANGINA PECTORIS: Primary | ICD-10-CM

## 2021-03-01 LAB
ANION GAP SERPL CALCULATED.3IONS-SCNC: 12 MMOL/L (ref 3–16)
BUN BLDV-MCNC: 18 MG/DL (ref 7–20)
CALCIUM SERPL-MCNC: 9.8 MG/DL (ref 8.3–10.6)
CHLORIDE BLD-SCNC: 100 MMOL/L (ref 99–110)
CO2: 30 MMOL/L (ref 21–32)
CREAT SERPL-MCNC: 1.1 MG/DL (ref 0.8–1.3)
GFR AFRICAN AMERICAN: >60
GFR NON-AFRICAN AMERICAN: >60
GLUCOSE BLD-MCNC: 148 MG/DL (ref 70–99)
POTASSIUM SERPL-SCNC: 3.9 MMOL/L (ref 3.5–5.1)
SODIUM BLD-SCNC: 142 MMOL/L (ref 136–145)

## 2021-03-01 PROCEDURE — 99214 OFFICE O/P EST MOD 30 MIN: CPT | Performed by: INTERNAL MEDICINE

## 2021-03-01 PROCEDURE — 36415 COLL VENOUS BLD VENIPUNCTURE: CPT

## 2021-03-01 PROCEDURE — G8417 CALC BMI ABV UP PARAM F/U: HCPCS | Performed by: INTERNAL MEDICINE

## 2021-03-01 PROCEDURE — G8484 FLU IMMUNIZE NO ADMIN: HCPCS | Performed by: INTERNAL MEDICINE

## 2021-03-01 PROCEDURE — 1036F TOBACCO NON-USER: CPT | Performed by: INTERNAL MEDICINE

## 2021-03-01 PROCEDURE — 3017F COLORECTAL CA SCREEN DOC REV: CPT | Performed by: INTERNAL MEDICINE

## 2021-03-01 PROCEDURE — 1123F ACP DISCUSS/DSCN MKR DOCD: CPT | Performed by: INTERNAL MEDICINE

## 2021-03-01 PROCEDURE — 4040F PNEUMOC VAC/ADMIN/RCVD: CPT | Performed by: INTERNAL MEDICINE

## 2021-03-01 PROCEDURE — G8427 DOCREV CUR MEDS BY ELIG CLIN: HCPCS | Performed by: INTERNAL MEDICINE

## 2021-03-01 PROCEDURE — 80048 BASIC METABOLIC PNL TOTAL CA: CPT

## 2021-03-01 RX ORDER — HYDROCHLOROTHIAZIDE 25 MG/1
25 TABLET ORAL DAILY
COMMUNITY
End: 2022-05-04 | Stop reason: ALTCHOICE

## 2021-03-01 NOTE — PROGRESS NOTES
[address]  Cardiac Follow Up     Referring Provider:  Gonzales March MD     Chief Complaint   Patient presents with    6 Month Follow-Up    Coronary Artery Disease    Hypertension    Hyperlipidemia        History of Present Illness:    Mr. Thelma Buchanan is seen today for preoperative risk evaluation and follow up for management of CAD, hypertension, hyperlipidemia. He also has ODALYS uses CPAP. He has progressive dyspnea with exertion. Cardiac cath 7/2020 patent grafts      Past Medical History: has a past medical history of Acute MI ([address] Back pain, CAD (coronary artery disease), GERD (gastroesophageal reflux disease), Hyperlipidemia, Hypertension, and Sleep apnea. Surgical History: has a past surgical history that includes Cardiac surgery; back surgery; Hand surgery; Gallbladder surgery; Coronary artery bypass graft; cyst removal (N/A, 1970); and Prostate biopsy (2020). Social History: reports that he quit smoking about 21 years ago. He has never used smokeless tobacco. He reports that he does not drink alcohol or use drugs. Family History:family history includes Cancer in his sister; Heart Attack (age of onset: 72) in his father; Heart Disease in his father and mother; Kidney Disease in his mother. Home Medications:  Prior to Visit Medications    Medication Sig Taking?  Authorizing Provider   atorvastatin (LIPITOR) 10 MG tablet TAKE 1 TABLET BY MOUTH ONE TIME A DAY Yes Colt Jc MD   terazosin (HYTRIN) 1 MG capsule TAKE 1 CAPSULE BY MOUTH  NIGHTLY Yes NEO Wu CNP   tiZANidine (ZANAFLEX) 4 MG tablet Take 4 mg by mouth every 6 hours as needed  Yes Historical Provider, MD   fexofenadine (ALLEGRA) 180 MG tablet Take 180 mg by mouth daily Yes Historical Provider, MD   Cholecalciferol (VITAMIN D3) 5000 UNITS TABS Take 1 tablet by mouth daily Yes Historical Provider, MD   metoprolol tartrate (LOPRESSOR) 25 MG tablet Take 0.5 tablets by mouth 2 times daily  Patient taking differently: Take 25 mg by mouth 2 times daily  Yes Argenis Gil APRN - YASH   amLODIPine (NORVASC) 10 MG tablet Take 1 tablet by mouth daily Yes Brissa Martinez MD   cloNIDine (CATAPRES) 0.1 MG tablet Take 1 tablet by mouth daily  Patient taking differently: Take 0.1 mg by mouth 2 times daily  Yes Brissa Martinez MD   magnesium gluconate (MAGONATE) 500 MG tablet Take 2,000 mg by mouth daily  Yes Historical Provider, MD   aspirin  MG EC tablet Take 325 mg by mouth daily  Yes Historical Provider, MD   oxyCODONE (OXY-IR) 30 MG immediate release tablet Take 15 mg by mouth 3 times daily. Yes Historical Provider, MD   testosterone cypionate (DEPOTESTOTERONE CYPIONATE) 100 MG/ML injection Inject 50 mg into the muscle. Every 3 months. Yes Historical Provider, MD   fish oil-omega-3 fatty acids 1000 MG capsule Take 1 g by mouth 2 times daily. Yes Historical Provider, MD   Glucosamine-Chondroit-Biofl-Mn (GLUCOSAMINE CHONDROITIN COMPLX) CAPS Take 1 capsule by mouth daily. Yes Historical Provider, MD   Omeprazole 20 MG TBEC Take 20 mg by mouth daily. Yes Historical Provider, MD   Coenzyme Q10 (COQ10) 100 MG CAPS Take 200 mg by mouth 3 times daily Indications: 200-500 MG QD  Yes Historical Provider, MD   Multiple Vitamin (MULTIVITAMIN PO) Take 1 tablet by mouth daily. Yes Historical Provider, MD   Cinnamon 500 MG CAPS Take 2 capsules by mouth as needed  Yes Historical Provider, MD   celecoxib (CELEBREX) 200 MG capsule Take 200 mg by mouth Daily. Yes Historical Provider, MD         Allergies:Carvedilol, Codeine, and Hydralazine     [x] Medications and dosages reviewed.     ROS:  [x]Full ROS obtained and negative except as mentioned in HPI      Physical Examination:     BP (!) 156/72 (Site: Left Upper Arm, Position: Sitting, Cuff Size: Large Adult)   Pulse 70   Resp 20   Ht 5' 7\" (1.702 m)   Wt 227 lb (103 kg)   SpO2 96%   BMI 35.55 kg/m²       · GENERAL: Well developed, well nourished, No acute distress  · NEUROLOGICAL: Alert and oriented  · PSYCH: Calm affect  · SKIN: Warm and dry, no visible rash  · HEENT: Normocephalic, Sclera non-icteric, mucus membranes moist  · NECK: supple, JVP normal  · CAROTID: Normal upstroke, no bruits  · CARDIAC: Normal PMI, regular rate and rhythm, normal S1S2, No murmur, rub, or gallop  · RESPIRATORY: Normal respiratory effort, clear to auscultation bilaterally  · EXTREMITIES: No LE edema  · MUSCULOSKELETAL: No joint swelling or tenderness, no chest wall tenderness  · GASTROINTESTINAL: normal bowel sounds, soft, non-tender, no bruit       ECHO (Images reviewed and agree)  Summary   Normal left ventricle size, wall thickness, and systolic function with an   estimated ejection fraction of 55%. No regional wall motion abnormalities are seen. Grade III diastolic dysfunction with elevated LV filling pressures. 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 There is moderate mitral regurgitation noted. Aortic valve appears sclerotic but opens adequately. Mild to moderate aortic   regurgitation. There is mild tricuspid regurgitation with a RVSP estimation of 44 mmHg. The right ventricle is normal in size and function. CARDIAC CATH 7/2020  LM-30%  LAD-prox 30%, mid 50%, competitive filling filling after mid vessel. LCX-occluded after OM1, Small caliber OM1 with 70% mid vessel. RCA-Occluded mid vessel  LV-mid anterior-lateral HK, EF=50%  LVEDP=10        LIMA=>LAD patent  SVG=>RCA patent  Radial=>OM Patent     IMP:  3 vessel CAD with patent grafts and mild LV dysfunction  Lateral ischemia seen on stress possibley due to OM1  Vessel is small and he has limited symptoms so will treat medically    LABS 10/2020  Creat=1.0  LDL=60  ALT/AST=26/33      Assessment:   1. Hyperlipidemia - LDL 60 on lipitor 10, well controlled. Continue current therapy     2. Hypertension, -  Uncontrolled.  High since lisinopril stopped due to hyperkalemia  Now watching K+ foods closer. Check K+ today and if improved try to restart lisinopril. If not will likely change lopressor to labetalol  BP (!) 156/72 (Site: Left Upper Arm, Position: Sitting, Cuff Size: Large Adult)   Pulse 70   Resp 20   Ht 5' 7\" (1.702 m)   Wt 227 lb (103 kg)   SpO2 96%   BMI 35.55 kg/m²      3. Coronary Artery Disease -- 2001 CABG. Stable. Medical management   4. ODALYS- stable, Uses CPAP nightly, Unchanged  5.  Hyperkalemia: Recheck as above    Plan:  Chem-7 today  If K+ down retry lisinopril, if not change to labetalol   F/u 3 weeks    Thank you for allowing me to participate in the care of this individual.    Rosmery Tony M.D., [facility]

## 2021-03-02 ENCOUNTER — TELEPHONE (OUTPATIENT)
Dept: CARDIOLOGY CLINIC | Age: 76
End: 2021-03-02

## 2021-03-02 DIAGNOSIS — I10 ESSENTIAL HYPERTENSION: Primary | ICD-10-CM

## 2021-03-02 NOTE — TELEPHONE ENCOUNTER
Patient had labs repeated to check potassium level before trying to restart Lisinopril for BP. Potassium was 3.9. Please call him and let him know if he should restart Lisinopril and at what dose.

## 2021-03-02 NOTE — TELEPHONE ENCOUNTER
Assessment:   1. Hyperlipidemia - LDL 60 on lipitor 10, well controlled. Continue current therapy     2. Hypertension, -  Uncontrolled. High since lisinopril stopped due to hyperkalemia  Now watching K+ foods closer. Check K+ today and if improved try to restart lisinopril. If not will likely change lopressor to labetalol  BP (!) 156/72 (Site: Left Upper Arm, Position: Sitting, Cuff Size: Large Adult)   Pulse 70   Resp 20   Ht 5' 7\" (1.702 m)   Wt 227 lb (103 kg)   SpO2 96%   BMI 35.55 kg/m²       3. Coronary Artery Disease -- 2001 CABG. Stable. Medical management   4. ODALYS- stable, Uses CPAP nightly, Unchanged  5. Hyperkalemia: Recheck as above     Plan:  Chem-7 today  If K+ down retry lisinopril, if not change to labetalol   F/u 3 weeks     Called and spoke with patient informed him of message below. He would like to know if his medications for blood pressure will be changing. He states that Lisinopril was stopped last year 2021 summer. He states that he is having eye surgery and they will not perform his surgery without it. Please advise for he would like to have a call back tomorrow.  thanks

## 2021-03-04 RX ORDER — HYDRALAZINE HYDROCHLORIDE 50 MG/1
50 TABLET, FILM COATED ORAL 2 TIMES DAILY
COMMUNITY
End: 2021-03-23 | Stop reason: SDUPTHER

## 2021-03-04 RX ORDER — LISINOPRIL 5 MG/1
5 TABLET ORAL DAILY
Qty: 30 TABLET | Refills: 5 | Status: SHIPPED | OUTPATIENT
Start: 2021-03-04 | End: 2021-03-23

## 2021-03-10 ENCOUNTER — TELEPHONE (OUTPATIENT)
Dept: CARDIOLOGY CLINIC | Age: 76
End: 2021-03-10

## 2021-03-10 NOTE — TELEPHONE ENCOUNTER
Pt calling. He said that he thought it was for lab results. He was given the results and verbalized understanding.

## 2021-03-17 ENCOUNTER — HOSPITAL ENCOUNTER (OUTPATIENT)
Age: 76
Discharge: HOME | End: 2021-03-17
Payer: MEDICARE

## 2021-03-17 DIAGNOSIS — I10 ESSENTIAL HYPERTENSION: ICD-10-CM

## 2021-03-17 LAB
ANION GAP SERPL CALCULATED.3IONS-SCNC: 12 MMOL/L (ref 3–16)
BUN BLDV-MCNC: 26 MG/DL (ref 7–20)
CALCIUM SERPL-MCNC: 9.2 MG/DL (ref 8.3–10.6)
CHLORIDE BLD-SCNC: 96 MMOL/L (ref 99–110)
CO2: 30 MMOL/L (ref 21–32)
CREAT SERPL-MCNC: 1 MG/DL (ref 0.8–1.3)
GFR AFRICAN AMERICAN: >60
GFR NON-AFRICAN AMERICAN: >60
GLUCOSE BLD-MCNC: 120 MG/DL (ref 70–99)
POTASSIUM SERPL-SCNC: 4.2 MMOL/L (ref 3.5–5.1)
SODIUM BLD-SCNC: 138 MMOL/L (ref 136–145)

## 2021-03-17 PROCEDURE — 36415 COLL VENOUS BLD VENIPUNCTURE: CPT

## 2021-03-17 PROCEDURE — 80048 BASIC METABOLIC PNL TOTAL CA: CPT

## 2021-03-18 ENCOUNTER — TELEPHONE (OUTPATIENT)
Dept: CARDIOLOGY CLINIC | Age: 76
End: 2021-03-18

## 2021-03-18 NOTE — TELEPHONE ENCOUNTER
----- Message from Mary Grace Crespo MD sent at 3/18/2021  5:09 PM EDT -----  Tell pt. their labs are good. F/u in clinic as planned.     1 [facility]

## 2021-03-23 ENCOUNTER — TELEPHONE (OUTPATIENT)
Dept: CARDIOLOGY CLINIC | Age: 76
End: 2021-03-23

## 2021-03-23 ENCOUNTER — OFFICE VISIT (OUTPATIENT)
Dept: CARDIOLOGY CLINIC | Age: 76
End: 2021-03-23
Payer: MEDICARE

## 2021-03-23 VITALS
HEIGHT: 67 IN | SYSTOLIC BLOOD PRESSURE: 158 MMHG | OXYGEN SATURATION: 96 % | DIASTOLIC BLOOD PRESSURE: 68 MMHG | HEART RATE: 62 BPM | BODY MASS INDEX: 34.87 KG/M2 | WEIGHT: 222.13 LBS

## 2021-03-23 DIAGNOSIS — E78.5 HYPERLIPIDEMIA, UNSPECIFIED HYPERLIPIDEMIA TYPE: ICD-10-CM

## 2021-03-23 DIAGNOSIS — I25.10 ATHEROSCLEROSIS OF NATIVE CORONARY ARTERY OF NATIVE HEART WITHOUT ANGINA PECTORIS: Primary | ICD-10-CM

## 2021-03-23 DIAGNOSIS — E87.5 HYPERKALEMIA: ICD-10-CM

## 2021-03-23 DIAGNOSIS — I10 ESSENTIAL HYPERTENSION, BENIGN: ICD-10-CM

## 2021-03-23 DIAGNOSIS — G47.33 OSA (OBSTRUCTIVE SLEEP APNEA): ICD-10-CM

## 2021-03-23 PROCEDURE — 4040F PNEUMOC VAC/ADMIN/RCVD: CPT | Performed by: INTERNAL MEDICINE

## 2021-03-23 PROCEDURE — 1036F TOBACCO NON-USER: CPT | Performed by: INTERNAL MEDICINE

## 2021-03-23 PROCEDURE — G8427 DOCREV CUR MEDS BY ELIG CLIN: HCPCS | Performed by: INTERNAL MEDICINE

## 2021-03-23 PROCEDURE — 3017F COLORECTAL CA SCREEN DOC REV: CPT | Performed by: INTERNAL MEDICINE

## 2021-03-23 PROCEDURE — G8417 CALC BMI ABV UP PARAM F/U: HCPCS | Performed by: INTERNAL MEDICINE

## 2021-03-23 PROCEDURE — 1123F ACP DISCUSS/DSCN MKR DOCD: CPT | Performed by: INTERNAL MEDICINE

## 2021-03-23 PROCEDURE — 99214 OFFICE O/P EST MOD 30 MIN: CPT | Performed by: INTERNAL MEDICINE

## 2021-03-23 PROCEDURE — G8484 FLU IMMUNIZE NO ADMIN: HCPCS | Performed by: INTERNAL MEDICINE

## 2021-03-23 RX ORDER — FUROSEMIDE 40 MG/1
40 TABLET ORAL DAILY
Qty: 90 TABLET | Refills: 3 | Status: SHIPPED | OUTPATIENT
Start: 2021-03-23 | End: 2021-03-23 | Stop reason: SDUPTHER

## 2021-03-23 RX ORDER — FUROSEMIDE 40 MG/1
40 TABLET ORAL DAILY
Qty: 90 TABLET | Refills: 3 | Status: SHIPPED | OUTPATIENT
Start: 2021-03-23 | End: 2021-06-04

## 2021-03-23 RX ORDER — HYDRALAZINE HYDROCHLORIDE 50 MG/1
50 TABLET, FILM COATED ORAL 2 TIMES DAILY
Qty: 180 TABLET | Refills: 4 | Status: SHIPPED | OUTPATIENT
Start: 2021-03-23 | End: 2021-10-01 | Stop reason: SDUPTHER

## 2021-03-23 RX ORDER — LISINOPRIL 10 MG/1
10 TABLET ORAL DAILY
Qty: 90 TABLET | Refills: 4 | Status: SHIPPED | OUTPATIENT
Start: 2021-03-23 | End: 2021-03-23 | Stop reason: SDUPTHER

## 2021-03-23 RX ORDER — LISINOPRIL 10 MG/1
10 TABLET ORAL DAILY
Qty: 90 TABLET | Refills: 4 | Status: SHIPPED | OUTPATIENT
Start: 2021-03-23 | End: 2021-10-01 | Stop reason: SDUPTHER

## 2021-03-23 RX ORDER — FUROSEMIDE 40 MG/1
40 TABLET ORAL DAILY
COMMUNITY
Start: 2021-02-25 | End: 2021-03-23 | Stop reason: SDUPTHER

## 2021-03-23 NOTE — TELEPHONE ENCOUNTER
PT in office today to see [address] [place] [address] and was given refills. 2 of them were reversed as far as where they need to go. Lisinopril sent to [facility] but needs to go to [facility] -lisinopril (PRINIVIL;ZESTRIL) 10 MG tablet   [facility] [address], [place] - [address] [address] [phone] [place] [phone] [address], [address] [facility]   Phone:  [phone]  Fax:  [phone]         Lasix sent to [facility] but needs to go to [facility] -   furosemide (LASIX) 40 MG tablet   [address] [facility] [phone] - F [phone]   [address] [place] [address]   Phone:  [phone]  Fax:  [phone]      Hydralazine is correct.

## 2021-03-23 NOTE — PROGRESS NOTES
[address]  Cardiac Follow Up     Referring Provider:  Manus Moritz, MD     Chief Complaint   Patient presents with    Follow-up    Coronary Artery Disease        History of Present Illness:    Mr. Janki Pastor is seen today for  follow up for management of CAD, hypertension, hyperlipidemia. He also has ODALYS uses CPAP. He is doing better. BP better controlled with addition of lisinopril. K+ OK. He is following Sodium and potassium intake closely. Past Medical History: has a past medical history of Acute MI ([address] Back pain, CAD (coronary artery disease), GERD (gastroesophageal reflux disease), Hyperlipidemia, Hypertension, and Sleep apnea. Surgical History: has a past surgical history that includes Cardiac surgery; back surgery; Hand surgery; Gallbladder surgery; Coronary artery bypass graft; cyst removal (N/A, 1970); and Prostate biopsy (2020). Social History: reports that he quit smoking about 21 years ago. He has never used smokeless tobacco. He reports that he does not drink alcohol or use drugs. Family History:family history includes Cancer in his sister; Heart Attack (age of onset: 72) in his father; Heart Disease in his father and mother; Kidney Disease in his mother. Home Medications:  Prior to Visit Medications    Medication Sig Taking?  Authorizing Provider   furosemide (LASIX) 40 MG tablet Take 40 mg by mouth daily Yes Historical Provider, MD   lisinopril (PRINIVIL;ZESTRIL) 5 MG tablet Take 1 tablet by mouth daily Yes Laura Blackburn MD   hydrALAZINE (APRESOLINE) 50 MG tablet Take 50 mg by mouth 2 times daily Yes Historical Provider, MD   hydroCHLOROthiazide (HYDRODIURIL) 25 MG tablet Take 25 mg by mouth daily Yes Historical Provider, MD   atorvastatin (LIPITOR) 10 MG tablet TAKE 1 TABLET BY MOUTH ONE TIME A DAY Yes Laura Blackburn MD   terazosin (HYTRIN) 1 MG capsule TAKE 1 CAPSULE BY MOUTH  NIGHTLY Yes NEO Fofana CNP   tiZANidine (ZANAFLEX) 4 MG tablet Take 4 mg by mouth every 6 hours as needed  Yes Historical Provider, MD   Cholecalciferol (VITAMIN D3) 5000 UNITS TABS Take 1 tablet by mouth daily Yes Historical Provider, MD   metoprolol tartrate (LOPRESSOR) 25 MG tablet Take 0.5 tablets by mouth 2 times daily  Patient taking differently: Take 25 mg by mouth 2 times daily  Yes NEO Herrmann CNP   amLODIPine (NORVASC) 10 MG tablet Take 1 tablet by mouth daily Yes Rashard Dempsey MD   cloNIDine (CATAPRES) 0.1 MG tablet Take 1 tablet by mouth daily  Patient taking differently: Take 0.1 mg by mouth 2 times daily  Yes Rashard Dempsey MD   magnesium gluconate (MAGONATE) 500 MG tablet Take 2,000 mg by mouth daily  Yes Historical Provider, MD   aspirin  MG EC tablet Take 325 mg by mouth daily  Yes Historical Provider, MD   oxyCODONE (OXY-IR) 30 MG immediate release tablet Take 15 mg by mouth 3 times daily. Yes Historical Provider, MD   testosterone cypionate (DEPOTESTOTERONE CYPIONATE) 100 MG/ML injection Inject 50 mg into the muscle. Every 3 months. Yes Historical Provider, MD   fish oil-omega-3 fatty acids 1000 MG capsule Take 1 g by mouth 2 times daily. Yes Historical Provider, MD   Glucosamine-Chondroit-Biofl-Mn (GLUCOSAMINE CHONDROITIN COMPLX) CAPS Take 1 capsule by mouth daily. Yes Historical Provider, MD   Omeprazole 20 MG TBEC Take 20 mg by mouth daily. Yes Historical Provider, MD   Coenzyme Q10 (COQ10) 100 MG CAPS Take 200 mg by mouth daily Indications: 200-500 MG QD  Yes Historical Provider, MD   Multiple Vitamin (MULTIVITAMIN PO) Take 1 tablet by mouth daily. Yes Historical Provider, MD   Cinnamon 500 MG CAPS Take 2 capsules by mouth as needed  Yes Historical Provider, MD   celecoxib (CELEBREX) 200 MG capsule Take 200 mg by mouth Daily.  Yes Historical Provider, MD   fexofenadine (ALLEGRA) 180 MG tablet Take 180 mg by mouth daily  Historical Provider, MD         Allergies:Carvedilol, Codeine, and Hydralazine     [x] Medications and dosages reviewed. ROS:  [x]Full ROS obtained and negative except as mentioned in HPI      Physical Examination:     BP (!) 158/68 (Site: Left Upper Arm, Position: Sitting, Cuff Size: Large Adult)   Pulse 62   Ht 5' 7\" (1.702 m)   Wt 222 lb 2 oz (100.8 kg)   SpO2 96%   BMI 34.79 kg/m²       · GENERAL: Well developed, well nourished, No acute distress  · NEUROLOGICAL: Alert and oriented  · PSYCH: Calm affect  · SKIN: Warm and dry, no visible rash  · HEENT: Normocephalic, Sclera non-icteric, mucus membranes moist  · NECK: supple, JVP normal  · CAROTID: Normal upstroke, no bruits  · CARDIAC: Normal PMI, regular rate and rhythm, normal S1S2, No murmur, rub, or gallop  · RESPIRATORY: Normal respiratory effort, clear to auscultation bilaterally  · EXTREMITIES: No LE edema  · MUSCULOSKELETAL: No joint swelling or tenderness, no chest wall tenderness  · GASTROINTESTINAL: normal bowel sounds, soft, non-tender, no bruit       ECHO (Images reviewed and agree)  Summary   Normal left ventricle size, wall thickness, and systolic function with an   estimated ejection fraction of 55%. No regional wall motion abnormalities are seen. Grade III diastolic dysfunction with elevated LV filling pressures. 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 There is moderate mitral regurgitation noted. Aortic valve appears sclerotic but opens adequately. Mild to moderate aortic   regurgitation. There is mild tricuspid regurgitation with a RVSP estimation of 44 mmHg. The right ventricle is normal in size and function. CARDIAC CATH 7/2020  LM-30%  LAD-prox 30%, mid 50%, competitive filling filling after mid vessel. LCX-occluded after OM1, Small caliber OM1 with 70% mid vessel.   RCA-Occluded mid vessel  LV-mid anterior-lateral HK, EF=50%  LVEDP=10        LIMA=>LAD patent  SVG=>RCA patent  Radial=>OM Patent     IMP:  3 vessel CAD with patent grafts and mild LV dysfunction  Lateral ischemia seen on stress possibley due to OM1  Vessel is small and he has limited symptoms so will treat medically    LABS 10/2020  Creat=1.0  LDL=60  ALT/AST=26/33    LABS: 3/17/21  K=4.2, creat=1      Assessment:   1. Hyperlipidemia - LDL 60 on lipitor 10, well controlled. Continue lipitor   2. Hypertension, -  Improved but still high  K+ Ok on lisinopril  Increase to 10 mg  Follow MARKEL  F/u 10 weeks with chem-7    BP (!) 158/68 (Site: Left Upper Arm, Position: Sitting, Cuff Size: Large Adult)   Pulse 62   Ht 5' 7\" (1.702 m)   Wt 222 lb 2 oz (100.8 kg)   SpO2 96%   BMI 34.79 kg/m²      3. Coronary Artery Disease -- 2001 CABG. Stable. Continue medical management   4. ODALYS- stable, Uses CPAP nightly, Unchanged  5.  Hyperkalemia: limit K+ and follow with lisinopril    Plan:  Increase lisinopril to 10  F/u 10 weeks with chem-7  Thank you for allowing me to participate in the care of this individual.    Iliana Del Cid M.D., [facility] [place]

## 2021-06-03 ENCOUNTER — HOSPITAL ENCOUNTER (OUTPATIENT)
Age: 76
Discharge: HOME | End: 2021-06-03
Payer: MEDICARE

## 2021-06-03 DIAGNOSIS — I10 ESSENTIAL HYPERTENSION, BENIGN: ICD-10-CM

## 2021-06-03 LAB
ANION GAP SERPL CALCULATED.3IONS-SCNC: 14 MMOL/L (ref 3–16)
BUN BLDV-MCNC: 35 MG/DL (ref 7–20)
CALCIUM SERPL-MCNC: 9.5 MG/DL (ref 8.3–10.6)
CHLORIDE BLD-SCNC: 100 MMOL/L (ref 99–110)
CO2: 25 MMOL/L (ref 21–32)
CREAT SERPL-MCNC: 1.4 MG/DL (ref 0.8–1.3)
GFR AFRICAN AMERICAN: 60
GFR NON-AFRICAN AMERICAN: 50
GLUCOSE BLD-MCNC: 120 MG/DL (ref 70–99)
POTASSIUM SERPL-SCNC: 5.2 MMOL/L (ref 3.5–5.1)
SODIUM BLD-SCNC: 139 MMOL/L (ref 136–145)

## 2021-06-03 PROCEDURE — 36415 COLL VENOUS BLD VENIPUNCTURE: CPT

## 2021-06-03 PROCEDURE — 80048 BASIC METABOLIC PNL TOTAL CA: CPT

## 2021-06-04 ENCOUNTER — TELEPHONE (OUTPATIENT)
Dept: CARDIOLOGY CLINIC | Age: 76
End: 2021-06-04

## 2021-06-04 RX ORDER — FUROSEMIDE 40 MG/1
20 TABLET ORAL DAILY
Qty: 90 TABLET | Refills: 3
Start: 2021-06-04 | End: 2022-01-26 | Stop reason: SDUPTHER

## 2021-06-04 NOTE — TELEPHONE ENCOUNTER
Discussed with patient. Reviewed instructions with patient. Reviewed high potassium foods to limit. He has appt on 6/8. Will give lab order slip then.      ----- Message from Jennifer Colón MD sent at 6/4/2021  8:53 AM EDT -----  Potassium a little higher and renal function slightly worse. LOW K+ DIET. Decrease lasix to 20 mg and drink more water. Recheck in 2 weeks.  If worse will have to stop lisinopril    [facility]

## 2021-06-08 ENCOUNTER — OFFICE VISIT (OUTPATIENT)
Dept: CARDIOLOGY CLINIC | Age: 76
End: 2021-06-08
Payer: MEDICARE

## 2021-06-08 VITALS
HEIGHT: 67 IN | DIASTOLIC BLOOD PRESSURE: 50 MMHG | OXYGEN SATURATION: 94 % | HEART RATE: 52 BPM | SYSTOLIC BLOOD PRESSURE: 122 MMHG | BODY MASS INDEX: 34.2 KG/M2 | WEIGHT: 217.9 LBS

## 2021-06-08 DIAGNOSIS — E78.2 MIXED HYPERLIPIDEMIA: ICD-10-CM

## 2021-06-08 DIAGNOSIS — I10 ESSENTIAL HYPERTENSION, BENIGN: ICD-10-CM

## 2021-06-08 DIAGNOSIS — E87.5 HYPERKALEMIA: ICD-10-CM

## 2021-06-08 DIAGNOSIS — I25.10 ATHEROSCLEROSIS OF NATIVE CORONARY ARTERY OF NATIVE HEART WITHOUT ANGINA PECTORIS: Primary | ICD-10-CM

## 2021-06-08 DIAGNOSIS — G47.33 OSA (OBSTRUCTIVE SLEEP APNEA): ICD-10-CM

## 2021-06-08 PROCEDURE — 99214 OFFICE O/P EST MOD 30 MIN: CPT | Performed by: INTERNAL MEDICINE

## 2021-06-08 PROCEDURE — G8427 DOCREV CUR MEDS BY ELIG CLIN: HCPCS | Performed by: INTERNAL MEDICINE

## 2021-06-08 PROCEDURE — 1123F ACP DISCUSS/DSCN MKR DOCD: CPT | Performed by: INTERNAL MEDICINE

## 2021-06-08 PROCEDURE — 4040F PNEUMOC VAC/ADMIN/RCVD: CPT | Performed by: INTERNAL MEDICINE

## 2021-06-08 PROCEDURE — G8417 CALC BMI ABV UP PARAM F/U: HCPCS | Performed by: INTERNAL MEDICINE

## 2021-06-08 PROCEDURE — 1036F TOBACCO NON-USER: CPT | Performed by: INTERNAL MEDICINE

## 2021-06-08 PROCEDURE — 3017F COLORECTAL CA SCREEN DOC REV: CPT | Performed by: INTERNAL MEDICINE

## 2021-06-08 RX ORDER — TURMERIC 400 MG
CAPSULE ORAL
COMMUNITY

## 2021-06-08 RX ORDER — UBIDECARENONE/VIT E ACET 100MG-5
CAPSULE ORAL DAILY
COMMUNITY

## 2021-06-08 RX ORDER — ATORVASTATIN CALCIUM 10 MG/1
TABLET, FILM COATED ORAL
Qty: 90 TABLET | Refills: 4 | Status: CANCELLED | OUTPATIENT
Start: 2021-06-08

## 2021-06-08 NOTE — PROGRESS NOTES
[facility]  Cardiac Follow Up     Referring Provider:  Luz Maria MD     Chief Complaint   Patient presents with    Follow-up     c/o some dizziness every now and then         History of Present Illness:    Mr. Marco Hernandez is seen today for  follow up for management of CAD, hypertension, hyperlipidemia. He also has ODALYS uses CPAP. He is doing better. BP better controlled with addition of lisinopril. K+ OK. He is following Sodium and potassium intake closely. He feels well. He has had some dizziness standing the past few week. Labs suggest slight dehydration. No chest pain. PSA back up to 6. Negative biopsy last year    Past Medical History: has a past medical history of Acute MI ([address] Back pain, CAD (coronary artery disease), GERD (gastroesophageal reflux disease), Hyperlipidemia, Hypertension, and Sleep apnea. Surgical History: has a past surgical history that includes Cardiac surgery; back surgery; Hand surgery; Gallbladder surgery; Coronary artery bypass graft; cyst removal (N/A, 1970); and Prostate biopsy (2020). Social History: reports that he quit smoking about 21 years ago. He has never used smokeless tobacco. He reports that he does not drink alcohol and does not use drugs. Family History:family history includes Cancer in his sister; Heart Attack (age of onset: 72) in his father; Heart Disease in his father and mother; Kidney Disease in his mother. Home Medications:  Prior to Visit Medications    Medication Sig Taking?  Authorizing Provider   furosemide (LASIX) 40 MG tablet Take 0.5 tablets by mouth daily Yes Shruthi Jimenez MD   hydrALAZINE (APRESOLINE) 50 MG tablet Take 1 tablet by mouth 2 times daily Yes Shruthi Jimenez MD   lisinopril (PRINIVIL;ZESTRIL) 10 MG tablet Take 1 tablet by mouth daily Yes Shruthi Jimenez MD   hydroCHLOROthiazide (HYDRODIURIL) 25 MG tablet Take 25 mg by mouth daily Yes Historical Provider, MD   atorvastatin (LIPITOR) 10 MG tablet TAKE 1 TABLET BY MOUTH Susanna Means MD   terazosin (HYTRIN) 1 MG capsule TAKE 1 CAPSULE BY MOUTH  NIGHTLY Yes NEO Mayes CNP   tiZANidine (ZANAFLEX) 4 MG tablet Take 4 mg by mouth every 6 hours as needed  Yes Historical Provider, MD   fexofenadine (ALLEGRA) 180 MG tablet Take 180 mg by mouth daily Yes Historical Provider, MD   Cholecalciferol (VITAMIN D3) 5000 UNITS TABS Take 1 tablet by mouth daily Yes Historical Provider, MD   metoprolol tartrate (LOPRESSOR) 25 MG tablet Take 0.5 tablets by mouth 2 times daily Yes NEO Mayes CNP   amLODIPine (NORVASC) 10 MG tablet Take 1 tablet by mouth daily Yes Emmanuel Alonzo MD   cloNIDine (CATAPRES) 0.1 MG tablet Take 1 tablet by mouth daily  Patient taking differently: Take 0.1 mg by mouth 2 times daily  Yes Emmanuel Alonzo MD   magnesium gluconate (MAGONATE) 500 MG tablet Take 2,000 mg by mouth daily  Yes Historical Provider, MD   aspirin  MG EC tablet Take 325 mg by mouth daily  Yes Historical Provider, MD   oxyCODONE (OXY-IR) 30 MG immediate release tablet Take 15 mg by mouth 3 times daily. Yes Historical Provider, MD   testosterone cypionate (DEPOTESTOTERONE CYPIONATE) 100 MG/ML injection Inject 50 mg into the muscle. Every 3 months. Yes Historical Provider, MD   fish oil-omega-3 fatty acids 1000 MG capsule Take 1 g by mouth daily  Yes Historical Provider, MD   Glucosamine-Chondroit-Biofl-Mn (GLUCOSAMINE CHONDROITIN COMPLX) CAPS Take 1 capsule by mouth daily. Yes Historical Provider, MD   Omeprazole 20 MG TBEC Take 20 mg by mouth daily. Yes Historical Provider, MD   Coenzyme Q10 (COQ10) 100 MG CAPS Take 200 mg by mouth daily Indications: 200-500 MG QD  Yes Historical Provider, MD   Multiple Vitamin (MULTIVITAMIN PO) Take 1 tablet by mouth daily. Yes Historical Provider, MD   Cinnamon 500 MG CAPS Take 2 capsules by mouth daily  Yes Historical Provider, MD   celecoxib (CELEBREX) 200 MG capsule Take 200 mg by mouth Daily.  Yes ischemia seen on stress possibley due to OM1  Vessel is small and he has limited symptoms so will treat medically        LABS: 3/17/21  K=5.2, creat=1.4      Assessment:   1. Hyperlipidemia - LDL 73 on lipitor 10, well controlled. Continue lipitor   2. Hypertension, -  Improved. Follow    BP (!) 122/50   Pulse 52   Ht 5' 7\" (1.702 m)   Wt 217 lb 14.4 oz (98.8 kg)   SpO2 94%   BMI 34.13 kg/m²      3. Coronary Artery Disease -- 2001 CABG. Stable. Continue medical management   4. ODALYS- stable, Uses CPAP nightly, Unchanged  5. Hyperkalemia: limit K+. Last K+=5.2 On HCTZ with lasix due to high K++. Limit K. Looks dry. Decrease lasix to 20.  Repeat chem-7 in 2 weeks    Plan:  Increase fluid  Decrease lasix to 20  Chem in 2 weeks  Discuss PSA with Crystal Barajas  F/u 3 months    Elizabeth Perez M.D., [facility] [place]

## 2021-06-08 NOTE — PATIENT INSTRUCTIONS
Talk to Dr Silvino Fabian about your PSA  Drink a little more water  Take Lasix 20 mg daily  If you start swelling and get SOB take a full tablet of Lasix  Labs in 2 weeks to recheck potassium and kidney function  Follow up in 3 months

## 2021-06-15 ENCOUNTER — HOSPITAL ENCOUNTER (OUTPATIENT)
Age: 76
Discharge: HOME | End: 2021-06-15
Payer: MEDICARE

## 2021-06-15 LAB
ESTRADIOL LEVEL: 9 PG/ML
HCT VFR BLD CALC: 36.2 % (ref 40.5–52.5)
HEMOGLOBIN: 12.3 G/DL (ref 13.5–17.5)
MCH RBC QN AUTO: 30.6 PG (ref 26–34)
MCHC RBC AUTO-ENTMCNC: 34.1 G/DL (ref 31–36)
MCV RBC AUTO: 89.8 FL (ref 80–100)
PDW BLD-RTO: 13.8 % (ref 12.4–15.4)
PLATELET # BLD: 147 K/UL (ref 135–450)
PMV BLD AUTO: 9.1 FL (ref 5–10.5)
PROSTATE SPECIFIC ANTIGEN: 3.98 NG/ML (ref 0–4)
RBC # BLD: 4.03 M/UL (ref 4.2–5.9)
WBC # BLD: 5.6 K/UL (ref 4–11)

## 2021-06-15 PROCEDURE — 84153 ASSAY OF PSA TOTAL: CPT

## 2021-06-15 PROCEDURE — 84403 ASSAY OF TOTAL TESTOSTERONE: CPT

## 2021-06-15 PROCEDURE — 85027 COMPLETE CBC AUTOMATED: CPT

## 2021-06-15 PROCEDURE — 36415 COLL VENOUS BLD VENIPUNCTURE: CPT

## 2021-06-15 PROCEDURE — 82670 ASSAY OF TOTAL ESTRADIOL: CPT

## 2021-06-17 LAB — TESTOSTERONE TOTAL: 263 NG/DL (ref 220–1000)

## 2021-06-22 ENCOUNTER — HOSPITAL ENCOUNTER (OUTPATIENT)
Age: 76
Discharge: HOME | End: 2021-06-22
Payer: MEDICARE

## 2021-06-22 DIAGNOSIS — E87.5 HYPERKALEMIA: ICD-10-CM

## 2021-06-22 DIAGNOSIS — I10 ESSENTIAL HYPERTENSION, BENIGN: ICD-10-CM

## 2021-06-22 LAB
ANION GAP SERPL CALCULATED.3IONS-SCNC: 14 MMOL/L (ref 3–16)
BUN BLDV-MCNC: 28 MG/DL (ref 7–20)
CALCIUM SERPL-MCNC: 9.4 MG/DL (ref 8.3–10.6)
CHLORIDE BLD-SCNC: 101 MMOL/L (ref 99–110)
CO2: 24 MMOL/L (ref 21–32)
CREAT SERPL-MCNC: 1.3 MG/DL (ref 0.8–1.3)
GFR AFRICAN AMERICAN: >60
GFR NON-AFRICAN AMERICAN: 54
GLUCOSE BLD-MCNC: 124 MG/DL (ref 70–99)
POTASSIUM SERPL-SCNC: 4.8 MMOL/L (ref 3.5–5.1)
SODIUM BLD-SCNC: 139 MMOL/L (ref 136–145)

## 2021-06-22 PROCEDURE — 36415 COLL VENOUS BLD VENIPUNCTURE: CPT

## 2021-06-22 PROCEDURE — 80048 BASIC METABOLIC PNL TOTAL CA: CPT

## 2021-06-23 ENCOUNTER — TELEPHONE (OUTPATIENT)
Dept: CARDIOLOGY CLINIC | Age: 76
End: 2021-06-23

## 2021-08-10 ENCOUNTER — OFFICE VISIT (OUTPATIENT)
Dept: PULMONOLOGY | Age: 76
End: 2021-08-10
Payer: MEDICARE

## 2021-08-10 VITALS
SYSTOLIC BLOOD PRESSURE: 110 MMHG | HEIGHT: 66 IN | HEART RATE: 74 BPM | DIASTOLIC BLOOD PRESSURE: 74 MMHG | OXYGEN SATURATION: 98 % | WEIGHT: 220 LBS | BODY MASS INDEX: 35.36 KG/M2

## 2021-08-10 DIAGNOSIS — G47.33 OBSTRUCTIVE SLEEP APNEA SYNDROME: Primary | ICD-10-CM

## 2021-08-10 DIAGNOSIS — I25.10 ATHEROSCLEROSIS OF NATIVE CORONARY ARTERY OF NATIVE HEART WITHOUT ANGINA PECTORIS: Chronic | ICD-10-CM

## 2021-08-10 DIAGNOSIS — I10 ESSENTIAL HYPERTENSION, BENIGN: Chronic | ICD-10-CM

## 2021-08-10 DIAGNOSIS — E66.9 NON MORBID OBESITY, UNSPECIFIED OBESITY TYPE: ICD-10-CM

## 2021-08-10 PROCEDURE — G8427 DOCREV CUR MEDS BY ELIG CLIN: HCPCS | Performed by: INTERNAL MEDICINE

## 2021-08-10 PROCEDURE — 1036F TOBACCO NON-USER: CPT | Performed by: INTERNAL MEDICINE

## 2021-08-10 PROCEDURE — 1123F ACP DISCUSS/DSCN MKR DOCD: CPT | Performed by: INTERNAL MEDICINE

## 2021-08-10 PROCEDURE — G8417 CALC BMI ABV UP PARAM F/U: HCPCS | Performed by: INTERNAL MEDICINE

## 2021-08-10 PROCEDURE — 99204 OFFICE O/P NEW MOD 45 MIN: CPT | Performed by: INTERNAL MEDICINE

## 2021-08-10 PROCEDURE — 3017F COLORECTAL CA SCREEN DOC REV: CPT | Performed by: INTERNAL MEDICINE

## 2021-08-10 PROCEDURE — 4040F PNEUMOC VAC/ADMIN/RCVD: CPT | Performed by: INTERNAL MEDICINE

## 2021-08-10 ASSESSMENT — SLEEP AND FATIGUE QUESTIONNAIRES
HOW LIKELY ARE YOU TO NOD OFF OR FALL ASLEEP WHILE SITTING AND TALKING TO SOMEONE: 1
ESS TOTAL SCORE: 11
HOW LIKELY ARE YOU TO NOD OFF OR FALL ASLEEP WHILE SITTING QUIETLY AFTER LUNCH WITHOUT ALCOHOL: 2
HOW LIKELY ARE YOU TO NOD OFF OR FALL ASLEEP WHILE SITTING AND READING: 2
HOW LIKELY ARE YOU TO NOD OFF OR FALL ASLEEP WHILE SITTING INACTIVE IN A PUBLIC PLACE: 0
NECK CIRCUMFERENCE (INCHES): 17
HOW LIKELY ARE YOU TO NOD OFF OR FALL ASLEEP IN A CAR, WHILE STOPPED FOR A FEW MINUTES IN TRAFFIC: 0
HOW LIKELY ARE YOU TO NOD OFF OR FALL ASLEEP WHEN YOU ARE A PASSENGER IN A CAR FOR AN HOUR WITHOUT A BREAK: 2
HOW LIKELY ARE YOU TO NOD OFF OR FALL ASLEEP WHILE LYING DOWN TO REST IN THE AFTERNOON WHEN CIRCUMSTANCES PERMIT: 2
HOW LIKELY ARE YOU TO NOD OFF OR FALL ASLEEP WHILE WATCHING TV: 2

## 2021-08-10 NOTE — LETTER
[facility] Sleep [facility]  [address] [facility]  [address] [address]  Phone: [phone]  Fax: [phone]    Sharlene Rapp MD    August 10, 2021     Anna Pablo, [id number] [facility], [address] [place], [place] [address] [address]    Patient: Denisha Mcpherson   MR Number: [id number]   YOB: 1945   Date of Visit: 8/10/2021       Dear Anna Pablo:    Thank you for referring Denisha Mcpherson to me for evaluation/treatment. Below are the relevant portions of my assessment and plan of care. Visit Diagnoses and Associated Orders     Obstructive sleep apnea syndrome   (New Problem)  -  Primary    on treatment         Atherosclerosis of native coronary artery of native heart without angina pectoris   (Stable)           Essential hypertension, benign   (Stable)           Non morbid obesity, unspecified obesity type   (Not Stable)                 Supplies and parts as needed for his machine. These are medically necessary. Continue medications per his PCP and other physicians. Limit caffeine use after 3pm.  Encouraged him to work on weight loss through diet and exercise. The primary encounter diagnosis was Obstructive sleep apnea syndrome. Diagnoses of Atherosclerosis of native coronary artery of native heart without angina pectoris, Essential hypertension, benign, and Non morbid obesity, unspecified obesity type were also pertinent to this visit. The chronic medical conditions listed are directly related to the primary diagnosis listed above. The management of the primary diagnosis affects the secondary diagnosis and vice versa. We will need to see the patient's machine and get old records before we can proceed further. The patient most likely require repeat titration but based on his chronic narcotic use will need to look for complex apnea as well which may require Adaptive ServoVentilaton.   Based on download data from his machine can determine the proper testing that is required

## 2021-08-10 NOTE — PROGRESS NOTES
Sindi Telles MD, Laura Coates, [place]  [facility]  Rosas  CNP [address]  Ra[address]l Rossi [address], [address] [facility] [phone]   [facility] [address] Ra[address]l Rossi. [address] [facility]. [address] [phone]     [address] [place] [address]  [address] [address]  Dept: [phone]  Loc: [phone]    Assessment:      Visit Diagnoses and Associated Orders     Obstructive sleep apnea syndrome   (New Problem)  -  Primary    on treatment         Atherosclerosis of native coronary artery of native heart without angina pectoris   (Stable)           Essential hypertension, benign   (Stable)           Non morbid obesity, unspecified obesity type   (Not Stable)                  Plan:      Supplies and parts as needed for his machine. These are medically necessary. Continue medications per his PCP and other physicians. Limit caffeine use after 3pm.  Encouraged him to work on weight loss through diet and exercise. The primary encounter diagnosis was Obstructive sleep apnea syndrome. Diagnoses of Atherosclerosis of native coronary artery of native heart without angina pectoris, Essential hypertension, benign, and Non morbid obesity, unspecified obesity type were also pertinent to this visit. The chronic medical conditions listed are directly related to the primary diagnosis listed above. The management of the primary diagnosis affects the secondary diagnosis and vice versa. We will need to see the patient's machine and get old records before we can proceed further. The patient most likely require repeat titration but based on his chronic narcotic use will need to look for complex apnea as well which may require Adaptive ServoVentilaton. Based on download data from his machine can determine the proper testing that is required and what other changes that will be needed.     Reviewed external note patient's previous sleep physician dated 5/21/2013 that shows patient was initially diagnosed with sleep apnea in  had been on CPAP since then. Reviewed CBC from 6/15/2021 as well as testosterone levels which were normal.  BMP from 2021 showed mild elevation in BUN and glucose. This information was analyzed to assess complexity and medical decision making in regards to further testing and management. Continue meds for: CAD and HTN. Pt would medically benefit from wt loss for ODALYS (diet, exercise, surgical). Subjective:     Patient ID: Saranya José is a 70 y.o. male. Chief Complaint   Patient presents with    Sleep Apnea       HPI:      Saranya José is a 70 y.o. male self-referred for a sleep evaluation. He complains of: Previous diagnosis sleep apnea back in the early . No old records are available at the time of the visit. He states he has been on his machine is done well until last few months where he feels he is not sleeping well, the pressures feel off, and he is having more daytime energy issues. He has been on chronic narcotics prior to being diagnosed with sleep apnea. He states his machine will not go any higher than 14 but did not bring it in for his visit today. DOT/CDL - No  FAA/'s license -No    Previous Report(s) Reviewed: historical medical records, office notes, andreferral letter(s). Pertinent data has been documented. [place] - Total score: 11    Caffeine Intake - None.     Social History     Socioeconomic History    Marital status:      Spouse name: Not on file    Number of children: Not on file    Years of education: Not on file    Highest education level: Not on file   Occupational History    Not on file   Tobacco Use    Smoking status: Former Smoker     Quit date: 2000     Years since quittin.4    Smokeless tobacco: Never Used   Vaping Use    Vaping Use: Never used   Substance and Sexual Activity    Alcohol use: No     Comment: 1 beer/yr    Drug use: No    Sexual activity: Yes   Other Topics Concern    Not on file   Social History Narrative    Not on file     Social Determinants of Health     Financial Resource Strain:     Difficulty of Paying Living Expenses:    Food Insecurity:     Worried About Running Out of Food in the Last Year:     [address] in the Last Year:    Transportation Needs:     Lack of Transportation (Medical):      Lack of Transportation (Non-Medical):    Physical Activity:     Days of Exercise per Week:     Minutes of Exercise per Session:    Stress:     Feeling of Stress :    Social Connections:     Frequency of Communication with Friends and Family:     Frequency of Social Gatherings with Friends and Family:     Attends [employer]ist Services:     Active Member of Clubs or Organizations:     Attends Club or Organization Meetings:     Marital Status:    Intimate Partner Violence:     Fear of Current or Ex-Partner:     Emotionally Abused:     Physically Abused:     Sexually Abused:         Current Outpatient Medications   Medication Instructions    ACETYLCARNITINE HCL PO Oral    amLODIPine (NORVASC) 10 mg, Oral, DAILY    aspirin 325 mg, Oral, DAILY    atorvastatin (LIPITOR) 10 MG tablet TAKE 1 TABLET BY MOUTH ONE TIME A DAY     celecoxib (CELEBREX) 200 mg, DAILY    Cholecalciferol (VITAMIN D3) 5000 UNITS TABS 1 tablet, Oral, DAILY    CINNAMON PO 1 capsule, Oral, DAILY    cloNIDine (CATAPRES) 0.1 mg, Oral, DAILY    CoQ10 200 mg, Oral, DAILY    CRANBERRY EXTRACT PO Oral    fexofenadine (ALLEGRA) 180 mg, Oral, DAILY    fish oil-omega-3 fatty acids 1 g, Oral, DAILY    furosemide (LASIX) 20 mg, Oral, DAILY    Ginkgo Biloba (GINKOBA PO) Oral, Plus ginseng     Glucosamine-Chondroit-Biofl-Mn (GLUCOSAMINE CHONDROITIN COMPLX) CAPS 1 capsule, DAILY    hydrALAZINE (APRESOLINE) 50 mg, Oral, 2 TIMES DAILY    hydroCHLOROthiazide (HYDRODIURIL) 25 mg, Oral, DAILY    lisinopril (PRINIVIL;ZESTRIL) 10 mg, Oral, DAILY    magnesium gluconate (MAGONATE) 2,000 mg, Oral, DAILY    metoprolol tartrate (LOPRESSOR) 12.5 mg, Oral, 2 TIMES DAILY    Multiple Vitamin (MULTIVITAMIN PO) 1 tablet, DAILY    NONFORMULARY Phosphatidal choline     omeprazole 20 mg, DAILY    oxyCODONE (OXY-IR) 15 mg, Oral, 3 TIMES DAILY    Probiotic Product (PROBIOTIC ADVANCED PO) Oral    Resveratrol 100 MG CAPS Oral, DAILY    terazosin (HYTRIN) 1 MG capsule TAKE 1 CAPSULE BY MOUTH  NIGHTLY    testosterone cypionate (DEPOTESTOTERONE CYPIONATE) 50 mg, Intramuscular, Every 3 months    tiZANidine (ZANAFLEX) 4 mg, Oral, EVERY 6 HOURS PRN    Turmeric 400 MG CAPS Oral    UNABLE TO FIND circulation and vein support           Objective:     Vitals:  Weight BMI   Wt Readings from Last 3 Encounters:   08/10/21 220 lb (99.8 kg)   06/08/21 217 lb 14.4 oz (98.8 kg)   03/23/21 222 lb 2 oz (100.8 kg)    Body mass index is 36.05 kg/m².      BP HR SaO2   BP Readings from Last 3 Encounters:   08/10/21 110/74   06/08/21 (!) 122/50   03/23/21 (!) 158/68    Pulse Readings from Last 3 Encounters:   08/10/21 74   06/08/21 52   03/23/21 62    SpO2 Readings from Last 3 Encounters:   08/10/21 98%   06/08/21 94%   03/23/21 96%          Electronically signed by Angeline Barnard MD on8/10/2021 at 2:33 PM

## 2021-08-16 ENCOUNTER — TELEPHONE (OUTPATIENT)
Dept: PULMONOLOGY | Age: 76
End: 2021-08-16

## 2021-08-16 NOTE — TELEPHONE ENCOUNTER
Pt in with unit for download. Per order from Dr. Ross Carranza unit was changed to APAP 15-20 cmH2O. Pt to call to schedule f/u.

## 2021-09-23 ENCOUNTER — HOSPITAL ENCOUNTER (OUTPATIENT)
Age: 76
Discharge: HOME | End: 2021-09-23
Payer: MEDICARE

## 2021-09-23 ENCOUNTER — HOSPITAL ENCOUNTER (OUTPATIENT)
Dept: GENERAL RADIOLOGY | Age: 76
Discharge: HOME | End: 2021-09-23
Payer: MEDICARE

## 2021-09-23 DIAGNOSIS — M25.562 LEFT KNEE PAIN, UNSPECIFIED CHRONICITY: ICD-10-CM

## 2021-09-23 PROCEDURE — 73560 X-RAY EXAM OF KNEE 1 OR 2: CPT

## 2021-10-01 ENCOUNTER — OFFICE VISIT (OUTPATIENT)
Dept: CARDIOLOGY CLINIC | Age: 76
End: 2021-10-01
Payer: MEDICARE

## 2021-10-01 ENCOUNTER — HOSPITAL ENCOUNTER (OUTPATIENT)
Age: 76
Discharge: HOME | End: 2021-10-01
Payer: MEDICARE

## 2021-10-01 VITALS
HEIGHT: 66 IN | HEART RATE: 67 BPM | SYSTOLIC BLOOD PRESSURE: 138 MMHG | DIASTOLIC BLOOD PRESSURE: 64 MMHG | OXYGEN SATURATION: 97 % | WEIGHT: 225 LBS | BODY MASS INDEX: 36.16 KG/M2

## 2021-10-01 DIAGNOSIS — I25.10 ATHEROSCLEROSIS OF NATIVE CORONARY ARTERY OF NATIVE HEART WITHOUT ANGINA PECTORIS: Primary | Chronic | ICD-10-CM

## 2021-10-01 DIAGNOSIS — E87.5 HYPERKALEMIA: ICD-10-CM

## 2021-10-01 DIAGNOSIS — I25.10 ATHEROSCLEROSIS OF NATIVE CORONARY ARTERY OF NATIVE HEART WITHOUT ANGINA PECTORIS: Chronic | ICD-10-CM

## 2021-10-01 DIAGNOSIS — R06.02 SOB (SHORTNESS OF BREATH): ICD-10-CM

## 2021-10-01 DIAGNOSIS — E78.2 MIXED HYPERLIPIDEMIA: Chronic | ICD-10-CM

## 2021-10-01 DIAGNOSIS — I10 ESSENTIAL HYPERTENSION, BENIGN: Chronic | ICD-10-CM

## 2021-10-01 DIAGNOSIS — G47.33 OSA (OBSTRUCTIVE SLEEP APNEA): Chronic | ICD-10-CM

## 2021-10-01 LAB
ANION GAP SERPL CALCULATED.3IONS-SCNC: 14 MMOL/L (ref 3–16)
BUN BLDV-MCNC: 26 MG/DL (ref 7–20)
CALCIUM SERPL-MCNC: 9.3 MG/DL (ref 8.3–10.6)
CHLORIDE BLD-SCNC: 101 MMOL/L (ref 99–110)
CO2: 26 MMOL/L (ref 21–32)
CREAT SERPL-MCNC: 1 MG/DL (ref 0.8–1.3)
GFR AFRICAN AMERICAN: >60
GFR NON-AFRICAN AMERICAN: >60
GLUCOSE BLD-MCNC: 123 MG/DL (ref 70–99)
HCT VFR BLD CALC: 38.1 % (ref 40.5–52.5)
HEMOGLOBIN: 12.7 G/DL (ref 13.5–17.5)
MCH RBC QN AUTO: 30.8 PG (ref 26–34)
MCHC RBC AUTO-ENTMCNC: 33.2 G/DL (ref 31–36)
MCV RBC AUTO: 92.7 FL (ref 80–100)
PDW BLD-RTO: 12.5 % (ref 12.4–15.4)
PLATELET # BLD: 175 K/UL (ref 135–450)
PMV BLD AUTO: 8.5 FL (ref 5–10.5)
POTASSIUM SERPL-SCNC: 4.7 MMOL/L (ref 3.5–5.1)
RBC # BLD: 4.11 M/UL (ref 4.2–5.9)
SODIUM BLD-SCNC: 141 MMOL/L (ref 136–145)
WBC # BLD: 5.9 K/UL (ref 4–11)

## 2021-10-01 PROCEDURE — 3017F COLORECTAL CA SCREEN DOC REV: CPT | Performed by: INTERNAL MEDICINE

## 2021-10-01 PROCEDURE — G8417 CALC BMI ABV UP PARAM F/U: HCPCS | Performed by: INTERNAL MEDICINE

## 2021-10-01 PROCEDURE — 4040F PNEUMOC VAC/ADMIN/RCVD: CPT | Performed by: INTERNAL MEDICINE

## 2021-10-01 PROCEDURE — 36415 COLL VENOUS BLD VENIPUNCTURE: CPT

## 2021-10-01 PROCEDURE — G8484 FLU IMMUNIZE NO ADMIN: HCPCS | Performed by: INTERNAL MEDICINE

## 2021-10-01 PROCEDURE — 80048 BASIC METABOLIC PNL TOTAL CA: CPT

## 2021-10-01 PROCEDURE — 99214 OFFICE O/P EST MOD 30 MIN: CPT | Performed by: INTERNAL MEDICINE

## 2021-10-01 PROCEDURE — 1123F ACP DISCUSS/DSCN MKR DOCD: CPT | Performed by: INTERNAL MEDICINE

## 2021-10-01 PROCEDURE — 85027 COMPLETE CBC AUTOMATED: CPT

## 2021-10-01 PROCEDURE — G8427 DOCREV CUR MEDS BY ELIG CLIN: HCPCS | Performed by: INTERNAL MEDICINE

## 2021-10-01 PROCEDURE — 1036F TOBACCO NON-USER: CPT | Performed by: INTERNAL MEDICINE

## 2021-10-01 RX ORDER — ATORVASTATIN CALCIUM 10 MG/1
TABLET, FILM COATED ORAL
Qty: 90 TABLET | Refills: 4 | Status: SHIPPED | OUTPATIENT
Start: 2021-10-01 | End: 2022-05-17 | Stop reason: SDUPTHER

## 2021-10-01 RX ORDER — LISINOPRIL 10 MG/1
10 TABLET ORAL DAILY
Qty: 90 TABLET | Refills: 4 | Status: SHIPPED | OUTPATIENT
Start: 2021-10-01 | End: 2022-05-17 | Stop reason: SDUPTHER

## 2021-10-01 RX ORDER — HYDRALAZINE HYDROCHLORIDE 50 MG/1
50 TABLET, FILM COATED ORAL 2 TIMES DAILY
Qty: 180 TABLET | Refills: 4 | Status: SHIPPED | OUTPATIENT
Start: 2021-10-01 | End: 2022-05-17 | Stop reason: SDUPTHER

## 2021-10-01 NOTE — PROGRESS NOTES
[facility]  Cardiac Follow Up     Referring Provider:  Laquita Segura MD     Chief Complaint   Patient presents with    Coronary Artery Disease    Hypertension    Hyperlipidemia        History of Present Illness:    Mr. Gabriel Green is seen today for  follow up for management of CAD, hypertension, hyperlipidemia. He also has ODALYS uses CPAP. Having severe issues with sleep apnea. \"Machine turned all the way up\". Not sleeping. Falls asleep sitting in chair. He has visit with Dr. Abbie Mclaughlin in 10 days. No chest pain. He complains of increased dyspnea over the past few months    Past Medical History: has a past medical history of Acute MI ([address] Back pain, CAD (coronary artery disease), GERD (gastroesophageal reflux disease), Hyperlipidemia, Hypertension, and Sleep apnea. Surgical History: has a past surgical history that includes Cardiac surgery; back surgery; Hand surgery; Gallbladder surgery; Coronary artery bypass graft; cyst removal (N/A, 1970); and Prostate biopsy (2020). Social History: reports that he quit smoking about 21 years ago. He has never used smokeless tobacco. He reports that he does not drink alcohol and does not use drugs. Family History:family history includes Cancer in his sister; Heart Attack (age of onset: 72) in his father; Heart Disease in his father and mother; Kidney Disease in his mother. Home Medications:  Prior to Visit Medications    Medication Sig Taking?  Authorizing Provider   Turmeric 400 MG CAPS Take by mouth Yes Historical Provider, MD   CRANBERRY EXTRACT PO Take by mouth Yes Historical Provider, MD   Resveratrol 100 MG CAPS Take by mouth daily Yes Historical Provider, MD   Probiotic Product (PROBIOTIC ADVANCED PO) Take by mouth Yes Historical Provider, MD   Ginkgo Biloba (GINKOBA PO) Take by mouth Plus ginseng Yes Historical Provider, MD   NONFORMULARY Phosphatidal choline Yes Historical Provider, MD   furosemide (LASIX) 40 MG tablet Take 0.5 tablets by mouth daily Yes Niles Mcclelland MD   hydrALAZINE (APRESOLINE) 50 MG tablet Take 1 tablet by mouth 2 times daily Yes Niles Mcclelland MD   lisinopril (PRINIVIL;ZESTRIL) 10 MG tablet Take 1 tablet by mouth daily Yes Niles Mcclelland MD   hydroCHLOROthiazide (HYDRODIURIL) 25 MG tablet Take 25 mg by mouth daily Yes Historical Provider, MD   atorvastatin (LIPITOR) 10 MG tablet TAKE 1 TABLET BY MOUTH ONE TIME A DAY Yes Niles Mcclelland MD   terazosin (HYTRIN) 1 MG capsule TAKE 1 CAPSULE BY MOUTH  NIGHTLY Yes Searcy Siemens, APRN [facility]   tiZANidine (ZANAFLEX) 4 MG tablet Take 4 mg by mouth every 6 hours as needed  Yes Historical Provider, MD   fexofenadine (ALLEGRA) 180 MG tablet Take 180 mg by mouth daily Yes Historical Provider, MD   Cholecalciferol (VITAMIN D3) 5000 UNITS TABS Take 1 tablet by mouth daily Yes Historical Provider, MD   metoprolol tartrate (LOPRESSOR) 25 MG tablet Take 0.5 tablets by mouth 2 times daily Yes Searcy Siemens, APRROB - CNP   amLODIPine (NORVASC) 10 MG tablet Take 1 tablet by mouth daily Yes Niles Mcclelland MD   cloNIDine (CATAPRES) 0.1 MG tablet Take 1 tablet by mouth daily  Patient taking differently: Take 0.1 mg by mouth 2 times daily  Yes Niles Mcclelland MD   magnesium gluconate (MAGONATE) 500 MG tablet Take 2,000 mg by mouth daily  Yes Historical Provider, MD   aspirin  MG EC tablet Take 325 mg by mouth daily  Yes Historical Provider, MD   oxyCODONE (OXY-IR) 30 MG immediate release tablet Take 15 mg by mouth. 4 times a day Yes Historical Provider, MD   fish oil-omega-3 fatty acids 1000 MG capsule Take 1 g by mouth daily  Yes Historical Provider, MD   Glucosamine-Chondroit-Biofl-Mn (GLUCOSAMINE CHONDROITIN COMPLX) CAPS Take 1 capsule by mouth daily. Yes Historical Provider, MD   Omeprazole 20 MG TBEC Take 20 mg by mouth daily.    Yes Historical Provider, MD   Coenzyme Q10 (COQ10) 100 MG CAPS Take 200 mg by mouth daily Indications: 200-500 MG QD  Yes Historical Provider, MD   Multiple sclerotic but opens adequately. Mild to moderate aortic   regurgitation. There is mild tricuspid regurgitation with a RVSP estimation of 44 mmHg. The right ventricle is normal in size and function. CARDIAC CATH 7/2020  LM-30%  LAD-prox 30%, mid 50%, competitive filling filling after mid vessel. LCX-occluded after OM1, Small caliber OM1 with 70% mid vessel. RCA-Occluded mid vessel  LV-mid anterior-lateral HK, EF=50%  LVEDP=10        LIMA=>LAD patent  SVG=>RCA patent  Radial=>OM Patent     IMP:  3 vessel CAD with patent grafts and mild LV dysfunction  Lateral ischemia seen on stress possibley due to OM1  Vessel is small and he has limited symptoms so will treat medically            Assessment:   1. Hyperlipidemia - LDL 73 on lipitor 10, well controlled. Continue lipitor   2. Hypertension, -  Improved. Follow on current therapy    /64 (Site: Left Upper Arm, Position: Sitting, Cuff Size: Large Adult)   Pulse 67   Ht 5' 5.5\" (1.664 m)   Wt 225 lb (102.1 kg)   SpO2 97%   BMI 36.87 kg/m²      3. Coronary Artery Disease -- 2001 CABG. Stable. Cardiac cath 7/2020 as above. Continue medical management   4. ODALYS- Uncontrolled. He has f/u in sleep clinic 10 days  5. Hyperkalemia: improved. Check today  6.  Dyspnea: Needs sleep apnea controlled and will recheck ECHO on f/u 3 months    Plan:  F/u sleep clinic  F/u me 3 months with ECHO  Chem-7 today    Tiago Lentz M.D., [address] [facility]

## 2021-10-05 ENCOUNTER — TELEPHONE (OUTPATIENT)
Dept: CARDIOLOGY CLINIC | Age: 76
End: 2021-10-05

## 2021-10-05 NOTE — TELEPHONE ENCOUNTER
----- Message from Nan Sultana RN sent at 10/5/2021  5:16 PM EDT -----  WILMAR reviewed labs. Labs stable. FU as planned.

## 2021-10-12 ENCOUNTER — TELEPHONE (OUTPATIENT)
Dept: PULMONOLOGY | Age: 76
End: 2021-10-12

## 2021-10-12 DIAGNOSIS — G47.33 OSA (OBSTRUCTIVE SLEEP APNEA): Primary | ICD-10-CM

## 2021-10-12 DIAGNOSIS — I10 ESSENTIAL HYPERTENSION, BENIGN: ICD-10-CM

## 2021-10-12 DIAGNOSIS — E66.9 NON MORBID OBESITY, UNSPECIFIED OBESITY TYPE: ICD-10-CM

## 2021-10-12 NOTE — TELEPHONE ENCOUNTER
Called the patient to follow up with the new machine     Patient complaining of not feeling well    Re educated on testing needed     And new machine orders

## 2021-10-14 ENCOUNTER — HOSPITAL ENCOUNTER (OUTPATIENT)
Dept: SLEEP CENTER | Age: 76
Discharge: HOME | End: 2021-10-14
Payer: MEDICARE

## 2021-10-14 DIAGNOSIS — E66.9 NON MORBID OBESITY, UNSPECIFIED OBESITY TYPE: ICD-10-CM

## 2021-10-14 DIAGNOSIS — G47.33 OSA (OBSTRUCTIVE SLEEP APNEA): ICD-10-CM

## 2021-10-14 DIAGNOSIS — I10 ESSENTIAL HYPERTENSION, BENIGN: ICD-10-CM

## 2021-10-14 PROCEDURE — 95811 POLYSOM 6/>YRS CPAP 4/> PARM: CPT

## 2021-10-19 PROCEDURE — 95811 POLYSOM 6/>YRS CPAP 4/> PARM: CPT | Performed by: INTERNAL MEDICINE

## 2021-10-20 ENCOUNTER — TELEPHONE (OUTPATIENT)
Dept: PULMONOLOGY | Age: 76
End: 2021-10-20

## 2021-11-01 ENCOUNTER — TELEPHONE (OUTPATIENT)
Dept: PULMONOLOGY | Age: 76
End: 2021-11-01

## 2021-12-07 ENCOUNTER — TELEPHONE (OUTPATIENT)
Dept: PULMONOLOGY | Age: 76
End: 2021-12-07

## 2021-12-07 NOTE — TELEPHONE ENCOUNTER
Patient called to say he hasn't gotten his new machine yet and hasn't heard from his DME company. I will call Sharon Foster in the morning and check status of order.

## 2021-12-08 NOTE — TELEPHONE ENCOUNTER
Spoke with [employer] and they said he is ready to be scheduled and they will reach out to the patient.

## 2021-12-29 NOTE — TELEPHONE ENCOUNTER
The patient called because he didn't hear back from us and didn't hear from Cathalene Pap. I relayed him Regine's info that she spoke with [facility] and they're ready to schedule him. I advised him to contact Cathmagdyne Pap since it appears they're ready for him.

## 2022-01-26 ENCOUNTER — HOSPITAL ENCOUNTER (OUTPATIENT)
Dept: NON INVASIVE DIAGNOSTICS | Age: 77
Discharge: HOME | End: 2022-01-26
Payer: MEDICARE

## 2022-01-26 ENCOUNTER — OFFICE VISIT (OUTPATIENT)
Dept: CARDIOLOGY CLINIC | Age: 77
End: 2022-01-26
Payer: MEDICARE

## 2022-01-26 VITALS
HEART RATE: 57 BPM | SYSTOLIC BLOOD PRESSURE: 130 MMHG | OXYGEN SATURATION: 97 % | DIASTOLIC BLOOD PRESSURE: 50 MMHG | HEIGHT: 66 IN | BODY MASS INDEX: 36.03 KG/M2 | WEIGHT: 224.2 LBS

## 2022-01-26 DIAGNOSIS — R06.09 DYSPNEA ON EXERTION: Primary | ICD-10-CM

## 2022-01-26 DIAGNOSIS — I25.10 ATHEROSCLEROSIS OF NATIVE CORONARY ARTERY OF NATIVE HEART WITHOUT ANGINA PECTORIS: Chronic | ICD-10-CM

## 2022-01-26 DIAGNOSIS — I10 ESSENTIAL HYPERTENSION, BENIGN: Chronic | ICD-10-CM

## 2022-01-26 DIAGNOSIS — R06.02 SOB (SHORTNESS OF BREATH): ICD-10-CM

## 2022-01-26 DIAGNOSIS — E87.5 HYPERKALEMIA: ICD-10-CM

## 2022-01-26 DIAGNOSIS — G47.33 OSA (OBSTRUCTIVE SLEEP APNEA): Chronic | ICD-10-CM

## 2022-01-26 DIAGNOSIS — E78.2 MIXED HYPERLIPIDEMIA: Chronic | ICD-10-CM

## 2022-01-26 LAB
LV EF: 58 %
LVEF MODALITY: NORMAL

## 2022-01-26 PROCEDURE — 4040F PNEUMOC VAC/ADMIN/RCVD: CPT | Performed by: INTERNAL MEDICINE

## 2022-01-26 PROCEDURE — G8417 CALC BMI ABV UP PARAM F/U: HCPCS | Performed by: INTERNAL MEDICINE

## 2022-01-26 PROCEDURE — G8427 DOCREV CUR MEDS BY ELIG CLIN: HCPCS | Performed by: INTERNAL MEDICINE

## 2022-01-26 PROCEDURE — 3017F COLORECTAL CA SCREEN DOC REV: CPT | Performed by: INTERNAL MEDICINE

## 2022-01-26 PROCEDURE — 99214 OFFICE O/P EST MOD 30 MIN: CPT | Performed by: INTERNAL MEDICINE

## 2022-01-26 PROCEDURE — 1036F TOBACCO NON-USER: CPT | Performed by: INTERNAL MEDICINE

## 2022-01-26 PROCEDURE — G8484 FLU IMMUNIZE NO ADMIN: HCPCS | Performed by: INTERNAL MEDICINE

## 2022-01-26 PROCEDURE — 93306 TTE W/DOPPLER COMPLETE: CPT

## 2022-01-26 PROCEDURE — 1123F ACP DISCUSS/DSCN MKR DOCD: CPT | Performed by: INTERNAL MEDICINE

## 2022-01-26 RX ORDER — FUROSEMIDE 40 MG/1
20 TABLET ORAL DAILY
Qty: 90 TABLET | Refills: 3 | Status: SHIPPED | OUTPATIENT
Start: 2022-01-26 | End: 2022-07-12

## 2022-01-26 NOTE — PROGRESS NOTES
Edd Colón MD   Turmeric 400 MG CAPS Take by mouth Yes Historical Provider, MD   CRANBERRY EXTRACT PO Take by mouth Yes Historical Provider, MD   Resveratrol 100 MG CAPS Take by mouth daily Yes Historical Provider, MD   Probiotic Product (PROBIOTIC ADVANCED PO) Take by mouth Yes Historical Provider, MD   ACETYLCARNITINE HCL PO Take by mouth Yes Historical Provider, MD   UNABLE TO FIND circulation and vein support Yes Historical Provider, MD   NONFORMULARY Phosphatidal choline Yes Historical Provider, MD   hydroCHLOROthiazide (HYDRODIURIL) 25 MG tablet Take 25 mg by mouth daily Yes Historical Provider, MD   terazosin (HYTRIN) 1 MG capsule TAKE 1 CAPSULE BY MOUTH  NIGHTLY Yes NEO Apodaca CNP   tiZANidine (ZANAFLEX) 4 MG tablet Take 4 mg by mouth every 6 hours as needed  Yes Historical Provider, MD   fexofenadine (ALLEGRA) 180 MG tablet Take 180 mg by mouth daily Yes Historical Provider, MD   Cholecalciferol (VITAMIN D3) 5000 UNITS TABS Take 1 tablet by mouth daily Yes Historical Provider, MD   metoprolol tartrate (LOPRESSOR) 25 MG tablet Take 0.5 tablets by mouth 2 times daily Yes NEO Apodaca CNP   amLODIPine (NORVASC) 10 MG tablet Take 1 tablet by mouth daily Yes Sammy Snow MD   cloNIDine (CATAPRES) 0.1 MG tablet Take 1 tablet by mouth daily  Patient taking differently: Take 0.1 mg by mouth 2 times daily  Yes Sammy Snow MD   magnesium gluconate (MAGONATE) 500 MG tablet Take 2,000 mg by mouth daily  Yes Historical Provider, MD   aspirin  MG EC tablet Take 325 mg by mouth daily  Yes Historical Provider, MD   oxyCODONE (OXY-IR) 30 MG immediate release tablet Take 15 mg by mouth. 4 times a day Yes Historical Provider, MD   testosterone cypionate (DEPOTESTOTERONE CYPIONATE) 100 MG/ML injection Inject 50 mg into the muscle. Every 3 months.  Yes Historical Provider, MD   fish oil-omega-3 fatty acids 1000 MG capsule Take 1 g by mouth daily  Yes Historical Provider, MD Glucosamine-Chondroit-Biofl-Mn (GLUCOSAMINE CHONDROITIN COMPLX) CAPS Take 1 capsule by mouth daily. Yes Historical Provider, MD   Omeprazole 20 MG TBEC Take 20 mg by mouth daily. Yes Historical Provider, MD   Coenzyme Q10 (COQ10) 100 MG CAPS Take 200 mg by mouth daily Indications: 200-500 MG QD  Yes Historical Provider, MD   Multiple Vitamin (MULTIVITAMIN PO) Take 1 tablet by mouth daily. Yes Historical Provider, MD   CINNAMON PO Take 1 capsule by mouth daily  Yes Historical Provider, MD   celecoxib (CELEBREX) 200 MG capsule Take 200 mg by mouth Daily. Yes Historical Provider, MD         Allergies:Carvedilol, Codeine, and Hydralazine     [x] Medications and dosages reviewed. ROS:  [x]Full ROS obtained and negative except as mentioned in HPI      Physical Examination:     BP (!) 130/50 (Site: Left Upper Arm, Position: Sitting, Cuff Size: Large Adult)   Pulse 57   Ht 5' 5.5\" (1.664 m)   Wt 224 lb 3.2 oz (101.7 kg)   SpO2 97%   BMI 36.74 kg/m²       · GENERAL: Well developed, well nourished, No acute distress  · NEUROLOGICAL: Alert and oriented  · PSYCH: Calm affect  · SKIN: Warm and dry, no visible rash  · HEENT: Normocephalic, Sclera non-icteric, mucus membranes moist  · NECK: supple, JVP normal  · CAROTID: Normal upstroke, no bruits  · CARDIAC: Normal PMI, regular rate and rhythm, normal S1S2, No murmur, rub, or gallop  · RESPIRATORY: Normal respiratory effort, clear to auscultation bilaterally  · EXTREMITIES: no LE edema  · MUSCULOSKELETAL: No joint swelling or tenderness, no chest wall tenderness  · GASTROINTESTINAL: normal bowel sounds, soft, non-tender, no bruit       ECHO 2/2021  Summary   Normal left ventricle size, wall thickness, and systolic function with an   estimated ejection fraction of 55%. No regional wall motion abnormalities are seen. Grade III diastolic dysfunction with elevated LV filling pressures.    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 There is moderate mitral regurgitation noted. Aortic valve appears sclerotic but opens adequately. Mild to moderate aortic   regurgitation. There is mild tricuspid regurgitation with a RVSP estimation of 44 mmHg. The right ventricle is normal in size and function. CARDIAC CATH 7/2020  LM-30%  LAD-prox 30%, mid 50%, competitive filling filling after mid vessel. LCX-occluded after OM1, Small caliber OM1 with 70% mid vessel. RCA-Occluded mid vessel  LV-mid anterior-lateral HK, EF=50%  LVEDP=10        LIMA=>LAD patent  SVG=>RCA patent  Radial=>OM Patent     IMP:  3 vessel CAD with patent grafts and mild LV dysfunction  Lateral ischemia seen on stress possibley due to OM1  Vessel is small and he has limited symptoms so will treat medically    ECHO 1/26/2022  Summary   -Normal left ventricle size, wall thickness, and systolic function with an   estimated ejection fraction of 55-60%. -No regional wall motion abnormalities are seen.   -Aortic valve appears sclerotic but opens adequately. There is   mild-to-moderate aortic insufficiency.   -Thickened mitral valve without evidence of stenosis. There is moderate   mitral regurgitation.   -There is mild pulmonic regurgitation.   -Grade III diastolic dysfunction with elevated LV filling pressures. Avg.   E/e'=14.7            Assessment:   1. Hyperlipidemia - LDL 62 Continue lipitor 10, well controlled. 2. Hypertension, -  Controlled. Follow on current therapy    BP (!) 130/50 (Site: Left Upper Arm, Position: Sitting, Cuff Size: Large Adult)   Pulse 57   Ht 5' 5.5\" (1.664 m)   Wt 224 lb 3.2 oz (101.7 kg)   SpO2 97%   BMI 36.74 kg/m²      3. Coronary Artery Disease -- Stable. 2001 CABG. Stable. Cardiac cath 7/2020 as above. Continue medical management   4. ODALYS- Uncontrolled. He has f/u in sleep clinic in a few weeks  5. Hyperkalemia: improved. K=4.7. Follow  6. Dyspnea: Needs sleep apnea controlled.    ECHO with diastolic dysfunction and moderate

## 2022-02-18 ENCOUNTER — TELEPHONE (OUTPATIENT)
Dept: PULMONOLOGY | Age: 77
End: 2022-02-18

## 2022-02-18 NOTE — TELEPHONE ENCOUNTER
Patient has appt 2/22. Last phone note 12/8 says patient was ready to schedule for setup (per DME.) Did he get setup?  Need to call [facility]

## 2022-04-11 ENCOUNTER — OFFICE VISIT (OUTPATIENT)
Dept: PULMONOLOGY | Age: 77
End: 2022-04-11
Payer: MEDICARE

## 2022-04-11 VITALS
HEIGHT: 66 IN | WEIGHT: 230.8 LBS | HEART RATE: 62 BPM | DIASTOLIC BLOOD PRESSURE: 60 MMHG | BODY MASS INDEX: 37.09 KG/M2 | SYSTOLIC BLOOD PRESSURE: 142 MMHG | OXYGEN SATURATION: 95 %

## 2022-04-11 DIAGNOSIS — E66.9 NON MORBID OBESITY, UNSPECIFIED OBESITY TYPE: ICD-10-CM

## 2022-04-11 DIAGNOSIS — G47.33 OSA (OBSTRUCTIVE SLEEP APNEA): Primary | Chronic | ICD-10-CM

## 2022-04-11 DIAGNOSIS — I25.10 ATHEROSCLEROSIS OF NATIVE CORONARY ARTERY OF NATIVE HEART WITHOUT ANGINA PECTORIS: Chronic | ICD-10-CM

## 2022-04-11 DIAGNOSIS — I10 ESSENTIAL HYPERTENSION, BENIGN: Chronic | ICD-10-CM

## 2022-04-11 PROCEDURE — 1036F TOBACCO NON-USER: CPT | Performed by: NURSE PRACTITIONER

## 2022-04-11 PROCEDURE — 99214 OFFICE O/P EST MOD 30 MIN: CPT | Performed by: NURSE PRACTITIONER

## 2022-04-11 PROCEDURE — 3017F COLORECTAL CA SCREEN DOC REV: CPT | Performed by: NURSE PRACTITIONER

## 2022-04-11 PROCEDURE — 1123F ACP DISCUSS/DSCN MKR DOCD: CPT | Performed by: NURSE PRACTITIONER

## 2022-04-11 PROCEDURE — G8427 DOCREV CUR MEDS BY ELIG CLIN: HCPCS | Performed by: NURSE PRACTITIONER

## 2022-04-11 PROCEDURE — 4040F PNEUMOC VAC/ADMIN/RCVD: CPT | Performed by: NURSE PRACTITIONER

## 2022-04-11 PROCEDURE — G8417 CALC BMI ABV UP PARAM F/U: HCPCS | Performed by: NURSE PRACTITIONER

## 2022-04-11 ASSESSMENT — SLEEP AND FATIGUE QUESTIONNAIRES
HOW LIKELY ARE YOU TO NOD OFF OR FALL ASLEEP WHILE SITTING INACTIVE IN A PUBLIC PLACE: 0
HOW LIKELY ARE YOU TO NOD OFF OR FALL ASLEEP IN A CAR, WHILE STOPPED FOR A FEW MINUTES IN TRAFFIC: 0
HOW LIKELY ARE YOU TO NOD OFF OR FALL ASLEEP WHEN YOU ARE A PASSENGER IN A CAR FOR AN HOUR WITHOUT A BREAK: 0
HOW LIKELY ARE YOU TO NOD OFF OR FALL ASLEEP WHILE SITTING QUIETLY AFTER LUNCH WITHOUT ALCOHOL: 1
ESS TOTAL SCORE: 2
HOW LIKELY ARE YOU TO NOD OFF OR FALL ASLEEP WHILE SITTING AND READING: 0
HOW LIKELY ARE YOU TO NOD OFF OR FALL ASLEEP WHILE LYING DOWN TO REST IN THE AFTERNOON WHEN CIRCUMSTANCES PERMIT: 1
HOW LIKELY ARE YOU TO NOD OFF OR FALL ASLEEP WHILE SITTING AND TALKING TO SOMEONE: 0
HOW LIKELY ARE YOU TO NOD OFF OR FALL ASLEEP WHILE WATCHING TV: 0

## 2022-04-11 NOTE — ASSESSMENT & PLAN NOTE
Chronic-improving, but not fully controlled: Reviewed and analyzed results of physiologic download from patient's machine and reviewed with patient. Supplies and parts as needed for his machine. These are medically necessary. Limit caffeine use after 3pm. Based on the analyzed data will change following settings: Pressure support increased to 3. Will place order for overnight oximetry due to hypoxia shown on titration and patient tolerating lower pressures than what the titration study recommended. Encouraged patient to call the office if he has any issues with the pressure and to avoid adjusting the pressure on his own. Verbal and written instruction on PAP equipment cleaning and disinfection schedule provided. Will see him back in 3 months or sooner if issues arise.

## 2022-04-11 NOTE — PROGRESS NOTES
Noel Fajardo         : 1945    Diagnosis: [x] Hypoxia (R09.02) [] CSA (G47.31) [x] Apnea (G47.30)   Length of Need: [] 13 Months [] 99 Months [] Other:    Machine (LAYO!): [] Respironics Dream Station      Auto [] ResMed AirSense     Auto [] Other:     []  CPAP () [] Bilevel ()   Mode: [] Auto [] Spontaneous    Mode: [] Auto [] Spontaneous              Comfort Settings:        Humidifier: [] Heated ()        [] Water chamber replacement ()/ 1 per 6 months        Mask:   [] Nasal () /1 per 3 months [] Full Face () /1 per 3 months   [] Patient choice -Size and fit mask [] Patient Choice - Size and fit mask   [] Dispense:  [] Dispense:    [] Headgear () / 1 per 3 months [] Headgear () / 1 per 3 months   [] Replacement Nasal Cushion ()/2 per month [] Interface Replacement ()/1 per month   [] Replacement Nasal Pillows ()/2 per month         Tubing: [] Heated ()/1 per 3 months    [] Standard ()/1 per 3 months [] Other:           Filters: [] Non-disposable ()/1 per 6 months     [] Ultra-Fine, Disposable ()/2 per month        Miscellaneous: [] Chin Strap ()/ 1 per 6 months [] O2 bleed-in:       LPM   [x] Overnight Oximetry on CPAP/Bilevel []  Other:    [] Modem: ()         Start Order Date: 22    MEDICAL JUSTIFICATION:  I, the undersigned, certify that the above prescribed supplies are medically necessary for this patients wellbeing. In my opinion, the supplies are both reasonable and necessary in reference to accepted standards of medicalpractice in treatment of this patients condition.     NEO Daniel      NPI: [id number]       Order Signed Date: 22    Electronically signed by NEO Daniel on 2022 at 4:42 PM    Noel Fajardo  1945  [address] [facility] [address] [facility]  [phone] (home)   [phone] (mobile)      Insurance Info (confirm with patient if correct):  Payor/Plan Subscr  Sex Relation Sub.  Ins. ID Effective Group Num

## 2022-04-11 NOTE — LETTER
[facility]  [address] [facility]  [address] [address]  Phone: [phone]  Fax: [phone]    April 11, 2022       Patient: Darryl Starr   MR Number: [id number]   YOB: 1945   Date of Visit: 4/11/2022       Darryl Starr was seen for a follow up visit today. Here is my assessment and plan as well as an attached copy of his visit today:    ODALYS (obstructive sleep apnea)  Chronic-improving, but not fully controlled: Reviewed and analyzed results of physiologic download from patient's machine and reviewed with patient. Supplies and parts as needed for his machine. These are medically necessary. Limit caffeine use after 3pm. Based on the analyzed data will change following settings: Pressure support increased to 3. Will place order for overnight oximetry due to hypoxia shown on titration and patient tolerating lower pressures than what the titration study recommended. Encouraged patient to call the office if he has any issues with the pressure and to avoid adjusting the pressure on his own. Verbal and written instruction on PAP equipment cleaning and disinfection schedule provided. Will see him back in 3 months or sooner if issues arise. Coronary Artery Disease   Chronic- Stable. Discussed the importance of treating obstructive sleep apnea as part of the management of this disorder. Cont any meds per PCP and other physicians. Essential hypertension, benign  Chronic- Stable. Discussed the importance of treating obstructive sleep apnea as part of the management of this disorder. Cont any meds per PCP and other physicians. Non morbid obesity, unspecified obesity type  Chronic-not stable:  Discussed importance of treating obstructive sleep apnea and getting sufficient sleep to assist with weight control. Encouraged him to work on weight loss through diet and exercise. Recommended DASH or Mediterranean diets.         If you have questions or concerns, please do not hesitate to call me. I look forward to following Maria Mederos along with you.     Sincerely,    Freddi Burkitt, APRN    CC providers:  Ai Hope MD  [address], [facility] [address] [address]

## 2022-04-11 NOTE — PROGRESS NOTES
Bel Stokes MD, [facility], [facility]  Meg Olivarez CNP [address]  [address] [facility], [address] [phone]   [facility] [address] Silviano Prado. [address] [place] [address]. [address] [phone]     [address] [address] [phone]      Assessment/Plan:      1. ODALYS (obstructive sleep apnea)  Assessment & Plan:  Chronic-improving, but not fully controlled: Reviewed and analyzed results of physiologic download from patient's machine and reviewed with patient. Supplies and parts as needed for his machine. These are medically necessary. Limit caffeine use after 3pm. Based on the analyzed data will change following settings: Pressure support increased to 3. Will place order for overnight oximetry due to hypoxia shown on titration and patient tolerating lower pressures than what the titration study recommended. Encouraged patient to call the office if he has any issues with the pressure and to avoid adjusting the pressure on his own. Verbal and written instruction on PAP equipment cleaning and disinfection schedule provided. Will see him back in 3 months or sooner if issues arise. 2. Atherosclerosis of native coronary artery of native heart without angina pectoris  Assessment & Plan:   Chronic- Stable. Discussed the importance of treating obstructive sleep apnea as part of the management of this disorder. Cont any meds per PCP and other physicians. 3. Essential hypertension, benign  Assessment & Plan:  Chronic- Stable. Discussed the importance of treating obstructive sleep apnea as part of the management of this disorder. Cont any meds per PCP and other physicians. 4. Non morbid obesity, unspecified obesity type  Assessment & Plan:  Chronic-not stable:  Discussed importance of treating obstructive sleep apnea and getting sufficient sleep to assist with weight control. Encouraged him to work on weight loss through diet and exercise. Recommended DASH or Mediterranean diets. Reviewed, analyzed, and documented physiologic data from patient's PAP machine. This information was analyzed to assess complexity and medical decision making in regards to further testing and management. The primary encounter diagnosis was ODALYS (obstructive sleep apnea). Diagnoses of Atherosclerosis of native coronary artery of native heart without angina pectoris, Essential hypertension, benign, and Non morbid obesity, unspecified obesity type were also pertinent to this visit. The chronic medical conditions listed are directly related to the primary diagnosis listed above. The management of the primary diagnosis affects the secondary diagnosis and vice versa. Subjective:   Subjective   Patient ID: Tristan Collado is a 78 y.o. male. Chief Complaint   Patient presents with    Sleep Apnea       HPI:  Machine Modem/Download Info:  Compliance (hours/night): 9.53 hrs/night  % of nights >= 4 hrs: 71 %  Download AHI (/hour): 8.8 /HR   Average IPAP Pressure: 19.4 cmH2O  Average EPAP Pressure: 17.4 cmH2O               AUTO BILEVEL - Settings (ResMed)  IPAP Max: 20.6 cmH2O  EPAP Min: 14 cmH2O  Pressure Support: 0         PAP Mask  Mask Type: Nasal mask     Tristan Collado reports he is doing well acclimating to his bilevel machine. He had a titration on 10/14/21 that showed he failed CPAP changed to bilevel. He reports the pressure felt too high. He tried calling the office and reports he spoke to somebody but they told him to work on mask fit and will not lower the pressure. There is no documentation of any calls to this office. He stopped using the machine but then figured out how to change the settings on his machine and felt he had no other option but to lower the pressure to make it more comfortable so we could use the machine.   He has been trying to adjust the pressure on his machine and feels that he is finally found the current settings are working well. He has noticed that he is waking more rested and feels great. He essentially changed his machine back into an APAP. He is ok with the thought of changing the pressure back to bilevel settings but does not want the pressure to go any higher than 20. He does notice some mask leak from time to time but feels this is not bothersome and has improved since lowering the pressure. he denies headaches, congestion, nosebleeds, dryness, aerophagia, or drowsiness while driving. Had titration done on 10/14/2021. The study showed improved sleep-related breathing disorder with bilevel pressures of 21/18 cm H2O. Patient had failed CPAP and was changed to bilevel. Recommend auto bilevel pressures: IPAP max: 25, EPAP min: 16, pressure support: 3 cm H2O. With full facemask to control oral leak. [facility]    Turner - Total score: 2    Social History     Socioeconomic History    Marital status:      Spouse name: Not on file    Number of children: Not on file    Years of education: Not on file    Highest education level: Not on file   Occupational History    Not on file   Tobacco Use    Smoking status: Former Smoker     Quit date: 2000     Years since quittin.1    Smokeless tobacco: Never Used   Vaping Use    Vaping Use: Never used   Substance and Sexual Activity    Alcohol use: No     Comment: 1 beer/yr    Drug use: No    Sexual activity: Yes   Other Topics Concern    Not on file   Social History Narrative    Not on file     Social Determinants of Health     Financial Resource Strain:     Difficulty of Paying Living Expenses: Not on file   Food Insecurity:     Worried About [address] in the Last Year: Not on file    [address] in the Last Year: Not on file   Transportation Needs:     Lack of Transportation (Medical): Not on file    Lack of Transportation (Non-Medical):  Not on file   Physical Activity:     Days of Exercise per Week: Not on file    Minutes of Exercise per Session: Not on file   Stress:     Feeling of Stress : Not on file   Social Connections:     Frequency of Communication with Friends and Family: Not on file    Frequency of Social Gatherings with Friends and Family: Not on file    Attends [employer] Services: Not on file    Active Member of Clubs or Organizations: Not on file    Attends Club or Organization Meetings: Not on file    Marital Status: Not on file   Intimate Partner Violence:     Fear of Current or Ex-Partner: Not on file    Emotionally Abused: Not on file    Physically Abused: Not on file    Sexually Abused: Not on file   Housing Stability:     Unable to Pay for Housing in the Last Year: Not on file    Number of Places Lived in the Last Year: Not on file    Unstable Housing in the Last Year: Not on file       Current Outpatient Medications   Medication Instructions    ACETYLCARNITINE HCL PO Oral    amLODIPine (NORVASC) 10 mg, Oral, DAILY    aspirin 325 mg, Oral, DAILY    atorvastatin (LIPITOR) 10 MG tablet TAKE 1 TABLET BY MOUTH ONE TIME A DAY     celecoxib (CELEBREX) 200 mg, DAILY    Cholecalciferol (VITAMIN D3) 5000 UNITS TABS 1 tablet, Oral, DAILY    CINNAMON PO 1 capsule, Oral, DAILY    cloNIDine (CATAPRES) 0.1 mg, Oral, DAILY    CoQ10 200 mg, Oral, DAILY    CRANBERRY EXTRACT PO Oral    fexofenadine (ALLEGRA) 180 mg, Oral, DAILY    fish oil-omega-3 fatty acids 1 g, Oral, DAILY    furosemide (LASIX) 20 mg, Oral, DAILY    Glucosamine-Chondroit-Biofl-Mn (GLUCOSAMINE CHONDROITIN COMPLX) CAPS 1 capsule, DAILY    hydrALAZINE (APRESOLINE) 50 mg, Oral, 2 TIMES DAILY    hydroCHLOROthiazide (HYDRODIURIL) 25 mg, Oral, DAILY    lisinopril (PRINIVIL;ZESTRIL) 10 mg, Oral, DAILY    magnesium gluconate (MAGONATE) 2,000 mg, Oral, DAILY    metoprolol tartrate (LOPRESSOR) 12.5 mg, Oral, 2 TIMES DAILY    Multiple Vitamin (MULTIVITAMIN PO) 1 tablet, DAILY  NONFORMULARY Phosphatidal choline     omeprazole 20 mg, DAILY    oxyCODONE (OXY-IR) 15 mg, Oral, 4 times a day    Probiotic Product (PROBIOTIC ADVANCED PO) Oral    Resveratrol 100 MG CAPS Oral, DAILY    terazosin (HYTRIN) 1 MG capsule TAKE 1 CAPSULE BY MOUTH  NIGHTLY    testosterone cypionate (DEPOTESTOTERONE CYPIONATE) 50 mg, IntraMUSCular, Every 3 months    tiZANidine (ZANAFLEX) 4 mg, Oral, EVERY 6 HOURS PRN    Turmeric 400 MG CAPS Oral    UNABLE TO FIND circulation and vein support           Vitals:  Weight BMI   Wt Readings from Last 3 Encounters:   04/11/22 230 lb 12.8 oz (104.7 kg)   01/26/22 224 lb 3.2 oz (101.7 kg)   10/01/21 225 lb (102.1 kg)    Body mass index is 37.82 kg/m².      BP HR SaO2   BP Readings from Last 3 Encounters:   04/11/22 (!) 142/60   01/26/22 (!) 130/50   10/01/21 138/64    Pulse Readings from Last 3 Encounters:   04/11/22 62   01/26/22 57   10/01/21 67    SpO2 Readings from Last 3 Encounters:   04/11/22 95%   01/26/22 97%   10/01/21 97%        Electronically signed by NEO Mclean on 4/11/2022 at 4:40 PM

## 2022-04-27 ENCOUNTER — TELEPHONE (OUTPATIENT)
Dept: PULMONOLOGY | Age: 77
End: 2022-04-27

## 2022-04-27 NOTE — TELEPHONE ENCOUNTER
Overnight oximetry reviewed and is showing hypoxia for 15 minutes during the night of the study. Patient's mask shows a high leak during the night of the study. Overnight oximetry also showing some artifact. Over the last 2 weeks patient's average AHI has improved. However, during the most recent 5 days AHI seems to be increasing as well as mask leak. 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 Once mask leak is controlled, will repeat overnight oximetry.

## 2022-05-03 ENCOUNTER — TELEPHONE (OUTPATIENT)
Dept: CARDIOLOGY CLINIC | Age: 77
End: 2022-05-03

## 2022-05-03 DIAGNOSIS — R06.02 SOB (SHORTNESS OF BREATH): Primary | ICD-10-CM

## 2022-05-03 NOTE — TELEPHONE ENCOUNTER
Pt called in stating he is experiencing swelling in hands and feet and would like to know if his appt can be moved sooner than the 05.17 or if it needs to be.      Pt cb [phone]

## 2022-05-03 NOTE — PROGRESS NOTES
[address]   Congestive Heart Failure    PrimaryCare Doctor:  Elfego Castaneda MD  Primary Cardiologist: Eugene Roberson   Last/Next OV: 5/17      Chief Complaint:  SOB    History of Present Illness:  Leann Longo is a 78 y.o. male with CAD, HTN, HLD, ODALYS on BiPap who presents today with c/o HTN,wt gain, swelling in face and abd distension for the past 2weeks. Associated sx include dyspnea, fatigue  Phone Call 5/3/22: He is experiencing Shortness of Breath, face, stomach and feet have been swollen for a week Double lasix x 2 days usually 1/2 alt with whole, has some leg cramps  Chem-7, BNP done at PCP yesterday - trying to get results  He denies chest pain, palpitations, orthopnea, PND, exertional chest pressure/discomfort, early saiety, syncope. ER Visit: No  Recent Hospitalization: No    Baseline Weight: 224  Wt Readings from Last 3 Encounters:   05/04/22 237 lb (107.5 kg)   04/11/22 230 lb 12.8 oz (104.7 kg)   01/26/22 224 lb 3.2 oz (101.7 kg)          EF: 55-60%  Cardiac Imaging:ECHO 1/26/2022  Summary   -Normal left ventricle size, wall thickness, and systolic function with an   estimated ejection fraction of 55-60%.  -No regional wall motion abnormalities are seen.   -Aortic valve appears sclerotic but opens adequately. There is   mild-to-moderate aortic insufficiency.   -Thickened mitral valve without evidence of stenosis. There is moderate   mitral regurgitation.   -There is mild pulmonic regurgitation.   -Grade III diastolic dysfunction with elevated LV filling pressures.  Avg.   E/e'=14.7    ECHO 2/2021  Summary   Normal left ventricle size, wall thickness, and systolic function with an   estimated ejection fraction of 55%.   No regional wall motion abnormalities are seen.   Grade III diastolic dysfunction with elevated LV filling pressures.   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   There is moderate mitral regurgitation noted.   Aortic valve appears sclerotic but opens adequately. Mild to moderate aortic   regurgitation.  Suann Pillar is mild tricuspid regurgitation with a RVSP estimation of 44 mmHg.   The right ventricle is normal in size and function.     CARDIAC CATH 7/2020  LM-30%  LAD-prox 30%, mid 50%, competitive filling filling after mid vessel. LCX-occluded after OM1, Small caliber OM1 with 70% mid vessel. RCA-Occluded mid vessel  LV-mid anterior-lateral HK, EF=50%  LVEDP=10        LIMA=>LAD patent  SVG=>RCA patent  Radial=>OM Patent     IMP:  3 vessel CAD with patent grafts and mild LV dysfunction  Lateral ischemia seen on stress possibley due to OM1  Vessel is small and he has limited symptoms so will treat medically       Activity: below baseline  Can you walk 1-2 blocks or do a moderate amount of house/yard work?no      NYHA Class: III       Sodium Restrictions: 3g  Fluid Restrictions: 48-64 oz/day  Sodium and fluid restriction compliance: reviewed today    Pt Education: The patient has received education on the following topics: dietary sodium restriction, heart failure medications, the importance of physical activity, symptom management and weight monitoring     ODALYS Yes Treated: Yes  Referral:  No      Past Medical History:   has a past medical history of Acute MI ([facility] [address] Back pain, CAD (coronary artery disease), GERD (gastroesophageal reflux disease), Hyperlipidemia, Hypertension, and Sleep apnea. Surgical History:   has a past surgical history that includes Cardiac surgery; back surgery; Hand surgery; Gallbladder surgery; Coronary artery bypass graft; cyst removal (N/A, 1970); and Prostate biopsy (2020). Social History:   reports that he quit smoking about 22 years ago. He has never used smokeless tobacco. He reports that he does not drink alcohol and does not use drugs.    Family History:   Family History   Problem Relation Age of Onset    Heart Attack Father 72    Heart Disease Father     Kidney Disease Mother     Heart Disease Mother    [facility] Cancer Sister        HomeMedications:  Prior to Admission medications    Medication Sig Start Date End Date Taking?  Authorizing Provider   furosemide (LASIX) 40 MG tablet Take 0.5 tablets by mouth daily 1/26/22   Dominique Kay MD   lisinopril (PRINIVIL;ZESTRIL) 10 MG tablet Take 1 tablet by mouth daily 10/1/21   Dominique Kay MD   hydrALAZINE (APRESOLINE) 50 MG tablet Take 1 tablet by mouth 2 times daily 10/1/21   Dominique Kay MD   atorvastatin (LIPITOR) 10 MG tablet TAKE 1 TABLET BY MOUTH ONE TIME A DAY 10/1/21   Dominique Kay MD   Turmeric 400 MG CAPS Take by mouth    Historical Provider, MD   CRANBERRY EXTRACT PO Take by mouth    Historical Provider, MD   Resveratrol 100 MG CAPS Take by mouth daily    Historical Provider, MD   Probiotic Product (PROBIOTIC ADVANCED PO) Take by mouth    Historical Provider, MD   ACETYLCARNITINE HCL PO Take by mouth    Historical Provider, MD   UNABLE TO FIND circulation and vein support    Historical Provider, MD   NONFORMULARY Phosphatidal choline    Historical Provider, MD   hydroCHLOROthiazide (HYDRODIURIL) 25 MG tablet Take 25 mg by mouth daily    Historical Provider, MD   terazosin (HYTRIN) 1 MG capsule TAKE 1 CAPSULE BY MOUTH  NIGHTLY 6/22/18   NEO Horn CNP   tiZANidine (ZANAFLEX) 4 MG tablet Take 4 mg by mouth every 6 hours as needed  3/17/17   Historical Provider, MD   fexofenadine (ALLEGRA) 180 MG tablet Take 180 mg by mouth daily    Historical Provider, MD   Cholecalciferol (VITAMIN D3) 5000 UNITS TABS Take 1 tablet by mouth daily    Historical Provider, MD   metoprolol tartrate (LOPRESSOR) 25 MG tablet Take 0.5 tablets by mouth 2 times daily 9/29/16   NEO Horn CNP   amLODIPine (NORVASC) 10 MG tablet Take 1 tablet by mouth daily 9/23/16   Dominique Kay MD   cloNIDine (CATAPRES) 0.1 MG tablet Take 1 tablet by mouth daily  Patient taking differently: Take 0.1 mg by mouth 2 times daily  3/8/16   Dominique Kay MD   magnesium gluconate (MAGONATE) 500 MG tablet Take 2,000 mg by mouth daily     Historical Provider, MD   aspirin  MG EC tablet Take 325 mg by mouth daily     Historical Provider, MD   oxyCODONE (OXY-IR) 30 MG immediate release tablet Take 15 mg by mouth. 4 times a day    Historical Provider, MD   testosterone cypionate (DEPOTESTOTERONE CYPIONATE) 100 MG/ML injection Inject 50 mg into the muscle. Every 3 months. Historical Provider, MD   fish oil-omega-3 fatty acids 1000 MG capsule Take 1 g by mouth daily     Historical Provider, MD   Glucosamine-Chondroit-Biofl-Mn (GLUCOSAMINE CHONDROITIN COMPLX) CAPS Take 1 capsule by mouth daily. Historical Provider, MD   Omeprazole 20 MG TBEC Take 20 mg by mouth daily. Historical Provider, MD   Coenzyme Q10 (COQ10) 100 MG CAPS Take 200 mg by mouth daily Indications: 200-500 MG QD     Historical Provider, MD   Multiple Vitamin (MULTIVITAMIN PO) Take 1 tablet by mouth daily. Historical Provider, MD   CINNAMON PO Take 1 capsule by mouth daily     Historical Provider, MD   celecoxib (CELEBREX) 200 MG capsule Take 200 mg by mouth Daily. 2/19/11   Historical Provider, MD        Allergies:  Carvedilol, Codeine, and Hydralazine     ROS:   Review of Systems   Constitutional: Positive for fatigue. Respiratory: Positive for shortness of breath. Gastrointestinal: Positive for abdominal distention. Genitourinary: Negative. Musculoskeletal: Negative. Skin: Negative. Neurological: Negative. Hematological: Negative. Psychiatric/Behavioral: Negative. Physical Examination:    Vitals:    05/04/22 1510   BP: (!) 136/52   Pulse: 65   SpO2: 95%   Weight: 237 lb (107.5 kg)   Height: 5' 5.5\" (1.664 m)           Physical Exam  Cardiovascular:      Pulses: Normal pulses. Heart sounds: Normal heart sounds. Pulmonary:      Effort: Pulmonary effort is normal.      Breath sounds: Normal breath sounds. Musculoskeletal:      Right lower leg: Edema present. Left lower leg: Edema present. Lab Data:    CBC:   Lab Results   Component Value Date    WBC 5.9 10/01/2021    WBC 5.6 06/15/2021    WBC 6.3 04/27/2021    RBC 4.11 10/01/2021    RBC 4.03 06/15/2021    RBC 4.53 04/27/2021    HGB 12.7 10/01/2021    HGB 12.3 06/15/2021    HGB 13.7 04/27/2021    HCT 38.1 10/01/2021    HCT 36.2 06/15/2021    HCT 41.0 04/27/2021    MCV 92.7 10/01/2021    MCV 89.8 06/15/2021    MCV 90.6 04/27/2021    RDW 12.5 10/01/2021    RDW 13.8 06/15/2021    RDW 13.0 04/27/2021     10/01/2021     06/15/2021     04/27/2021     BMP:  Lab Results   Component Value Date     10/25/2021     10/01/2021     06/22/2021    K 4.7 10/25/2021    K 4.7 10/01/2021    K 4.8 06/22/2021     10/25/2021     10/01/2021     06/22/2021    CO2 26 10/25/2021    CO2 26 10/01/2021    CO2 24 06/22/2021    BUN 21 10/25/2021    BUN 26 10/01/2021    BUN 28 06/22/2021    CREATININE 1.2 10/25/2021    CREATININE 1.0 10/01/2021    CREATININE 1.3 06/22/2021     BNP: No results found for: PROBNP  Iron Studies:  No components found for: FE,  TIBC,  FERRITIN  Iron Deficiency Anemia:  No IV Iron Therapy:  No  2017 ACC/AHA HF Guidelines:   intravenous iron replacement in patients with New York Heart Association (NYHA) class II and III HF and iron deficiency (ferritin <100 ng/ml or 100-300 ng/ml if transferrin saturation <20%), to improve functional status and QoL. Assessment/Plan:    1. SOB (shortness of breath) - diurese, take higher dose lasix, consider kasey if labs ok, pt has hx hyperkalemia   2. Acute diastolic congestive heart failure ([facility] [address] - as above, if unable to use kasey will start entresto instead of ACE and consider adding jardiance as well, labs in one week   3.  Essential hypertension, benign - stop HCTZ and continue with above changes       Time   30-39minutes spent preparing to see patient including reviewing patient history/prior tests/prior consults, performing a medical exam, counseling and educating patient/family/caregiver, ordering medications/tests/procedures, referring and communicating with PCPs and other pertinent consultants, documenting information in the EMR, independently interpreting results and communicating to family and coordination of patient care. Instructions:   1. Medications: stop hydrochlorothiazide and start spironolactone 1/2 pill once a day and continue lasix at 40mg once a day, take on an empty stomach, weigh daily and you should see your wt decrease 1-2lb a day. Call zully if no wt loss by friday  2. Labs: one week after starting spironolactone  3. Lifestyle Recommendations: Weigh yourself every day in the morning after urination, call [place] if wt increases 2-3lb in one day or 5lb in one week, Limit sodium to 3000mg/day and fluids to 2L or 64oz/day.    4. Follow up: as scheduled        [place]: [phone]      I appreciate the opportunity of cooperating in the care of this individual.    Onesimo Silverio, NEO - CNP, CNP, 5/3/2022,1:00 PM

## 2022-05-03 NOTE — TELEPHONE ENCOUNTER
Spoke with patient and he is going to Dr. Joan Arizmendi office today to have labs drawn. He will take 40 mg of lasix tomorrow and is agreeable to appt with NPK[facility] tomorrow at 230. I will call Dr. Joan Arizmendi office to ensure they draw the labs Mmk would like. Patient expressed understanding of all instructions.

## 2022-05-03 NOTE — TELEPHONE ENCOUNTER
Dr. Polanco Ar office verified that they are doing a chem7 and BNP on patient. They will fax results over.

## 2022-05-03 NOTE — TELEPHONE ENCOUNTER
Spoke to patient about message below. Patient stated he is not having any chest pain. He is experiencing Shortness of Breath, face, stomach and feet have been swollen for a week . Patient was unable to take blood pressure at this time do to machine saying ERROR.

## 2022-05-04 ENCOUNTER — OFFICE VISIT (OUTPATIENT)
Dept: CARDIOLOGY CLINIC | Age: 77
End: 2022-05-04
Payer: MEDICARE

## 2022-05-04 VITALS
HEART RATE: 65 BPM | OXYGEN SATURATION: 95 % | SYSTOLIC BLOOD PRESSURE: 136 MMHG | WEIGHT: 237 LBS | BODY MASS INDEX: 38.09 KG/M2 | DIASTOLIC BLOOD PRESSURE: 52 MMHG | HEIGHT: 66 IN

## 2022-05-04 DIAGNOSIS — I10 ESSENTIAL HYPERTENSION, BENIGN: Chronic | ICD-10-CM

## 2022-05-04 DIAGNOSIS — I50.31 ACUTE DIASTOLIC CONGESTIVE HEART FAILURE (HCC): ICD-10-CM

## 2022-05-04 DIAGNOSIS — R06.02 SOB (SHORTNESS OF BREATH): Primary | ICD-10-CM

## 2022-05-04 PROCEDURE — G8427 DOCREV CUR MEDS BY ELIG CLIN: HCPCS | Performed by: NURSE PRACTITIONER

## 2022-05-04 PROCEDURE — 1036F TOBACCO NON-USER: CPT | Performed by: NURSE PRACTITIONER

## 2022-05-04 PROCEDURE — G8417 CALC BMI ABV UP PARAM F/U: HCPCS | Performed by: NURSE PRACTITIONER

## 2022-05-04 PROCEDURE — 3017F COLORECTAL CA SCREEN DOC REV: CPT | Performed by: NURSE PRACTITIONER

## 2022-05-04 PROCEDURE — 4040F PNEUMOC VAC/ADMIN/RCVD: CPT | Performed by: NURSE PRACTITIONER

## 2022-05-04 PROCEDURE — 99214 OFFICE O/P EST MOD 30 MIN: CPT | Performed by: NURSE PRACTITIONER

## 2022-05-04 PROCEDURE — 1123F ACP DISCUSS/DSCN MKR DOCD: CPT | Performed by: NURSE PRACTITIONER

## 2022-05-04 RX ORDER — SPIRONOLACTONE 25 MG/1
12.5 TABLET ORAL DAILY
Qty: 30 TABLET | Refills: 0 | Status: SHIPPED
Start: 2022-05-04 | End: 2022-05-16 | Stop reason: SINTOL

## 2022-05-04 ASSESSMENT — ENCOUNTER SYMPTOMS
SHORTNESS OF BREATH: 1
ABDOMINAL DISTENTION: 1

## 2022-05-04 NOTE — Clinical Note
Please let pt know that I got his labs and potassium is ok, he needs to make sure he does not use any No salt or eat high potassium foods as we start kasey. Call tomorrow if no wt loss and get labs drawn next Wednesday please so I can keep a close eye on potassium.  Ryan Sam

## 2022-05-04 NOTE — PATIENT INSTRUCTIONS
Instructions:   1. Medications: stop hydrochlorothiazide and start spironolactone 1/2 pill once a day and continue lasix at 40mg once a day, take on an empty stomach, weigh daily and you should see your wt decrease 1-2lb a day. Call zully if no wt loss by friday  2. Labs: one week after starting spironolactone  3. Lifestyle Recommendations: Weigh yourself every day in the morning after urination, call Emilia Marcelo if wt increases 2-3lb in one day or 5lb in one week, Limit sodium to 3000mg/day and fluids to 2L or 64oz/day.    4. Follow up: as scheduled        Marielle: [phone]

## 2022-05-05 ENCOUNTER — TELEPHONE (OUTPATIENT)
Dept: CARDIOLOGY CLINIC | Age: 77
End: 2022-05-05

## 2022-05-05 NOTE — TELEPHONE ENCOUNTER
----- Message from NEO Matt CNP sent at 5/5/2022  9:02 AM EDT -----  Please let pt know that I got his labs and potassium is ok, he needs to make sure he does not use any No salt or eat high potassium foods as we start kasey. Call tomorrow if no wt loss and get labs drawn next Wednesday please so I can keep a close eye on potassium.  King Cantu

## 2022-05-05 NOTE — TELEPHONE ENCOUNTER
Spoke to patient he will start the spirolactone tomorrow and get labs drawn next Friday. He is not using no salt and he will call you on Friday next week about his weight loss.

## 2022-05-13 ENCOUNTER — HOSPITAL ENCOUNTER (OUTPATIENT)
Age: 77
Discharge: HOME | End: 2022-05-13
Payer: MEDICARE

## 2022-05-13 DIAGNOSIS — R06.02 SOB (SHORTNESS OF BREATH): ICD-10-CM

## 2022-05-13 PROCEDURE — 36415 COLL VENOUS BLD VENIPUNCTURE: CPT

## 2022-05-13 PROCEDURE — 83880 ASSAY OF NATRIURETIC PEPTIDE: CPT

## 2022-05-13 PROCEDURE — 80048 BASIC METABOLIC PNL TOTAL CA: CPT

## 2022-05-14 LAB
ANION GAP SERPL CALCULATED.3IONS-SCNC: 13 MMOL/L (ref 3–16)
BUN BLDV-MCNC: 34 MG/DL (ref 7–20)
CALCIUM SERPL-MCNC: 8.7 MG/DL (ref 8.3–10.6)
CHLORIDE BLD-SCNC: 103 MMOL/L (ref 99–110)
CO2: 24 MMOL/L (ref 21–32)
CREAT SERPL-MCNC: 1.5 MG/DL (ref 0.8–1.3)
GFR AFRICAN AMERICAN: 55
GFR NON-AFRICAN AMERICAN: 46
GLUCOSE BLD-MCNC: 112 MG/DL (ref 70–99)
POTASSIUM SERPL-SCNC: 5.5 MMOL/L (ref 3.5–5.1)
PRO-BNP: 552 PG/ML (ref 0–449)
SODIUM BLD-SCNC: 140 MMOL/L (ref 136–145)

## 2022-05-16 ENCOUNTER — TELEPHONE (OUTPATIENT)
Dept: CARDIOLOGY CLINIC | Age: 77
End: 2022-05-16

## 2022-05-16 NOTE — TELEPHONE ENCOUNTER
Dr Loraine Duggan reviewed patient's lab results. Potassium is elevated at 5.5. Per Dr Loraine Duggan - stop Aldactone and see me inn office as scheduled tomorrow 5/17. Notified patient. He verbalized understanding.

## 2022-05-17 ENCOUNTER — OFFICE VISIT (OUTPATIENT)
Dept: CARDIOLOGY CLINIC | Age: 77
End: 2022-05-17
Payer: MEDICARE

## 2022-05-17 VITALS
BODY MASS INDEX: 36.77 KG/M2 | HEIGHT: 66 IN | WEIGHT: 228.8 LBS | DIASTOLIC BLOOD PRESSURE: 60 MMHG | SYSTOLIC BLOOD PRESSURE: 138 MMHG | HEART RATE: 52 BPM | OXYGEN SATURATION: 96 %

## 2022-05-17 DIAGNOSIS — I34.0 NONRHEUMATIC MITRAL VALVE REGURGITATION: ICD-10-CM

## 2022-05-17 DIAGNOSIS — I50.32 CHRONIC DIASTOLIC HEART FAILURE (HCC): ICD-10-CM

## 2022-05-17 DIAGNOSIS — I25.10 ATHEROSCLEROSIS OF NATIVE CORONARY ARTERY OF NATIVE HEART WITHOUT ANGINA PECTORIS: Primary | Chronic | ICD-10-CM

## 2022-05-17 DIAGNOSIS — G47.33 OSA (OBSTRUCTIVE SLEEP APNEA): Chronic | ICD-10-CM

## 2022-05-17 DIAGNOSIS — I10 ESSENTIAL HYPERTENSION, BENIGN: Chronic | ICD-10-CM

## 2022-05-17 DIAGNOSIS — E87.5 HYPERKALEMIA: ICD-10-CM

## 2022-05-17 PROCEDURE — G8427 DOCREV CUR MEDS BY ELIG CLIN: HCPCS | Performed by: INTERNAL MEDICINE

## 2022-05-17 PROCEDURE — G8417 CALC BMI ABV UP PARAM F/U: HCPCS | Performed by: INTERNAL MEDICINE

## 2022-05-17 PROCEDURE — 4040F PNEUMOC VAC/ADMIN/RCVD: CPT | Performed by: INTERNAL MEDICINE

## 2022-05-17 PROCEDURE — 1036F TOBACCO NON-USER: CPT | Performed by: INTERNAL MEDICINE

## 2022-05-17 PROCEDURE — 3017F COLORECTAL CA SCREEN DOC REV: CPT | Performed by: INTERNAL MEDICINE

## 2022-05-17 PROCEDURE — 99214 OFFICE O/P EST MOD 30 MIN: CPT | Performed by: INTERNAL MEDICINE

## 2022-05-17 PROCEDURE — 1123F ACP DISCUSS/DSCN MKR DOCD: CPT | Performed by: INTERNAL MEDICINE

## 2022-05-17 RX ORDER — HYDROCHLOROTHIAZIDE 25 MG/1
25 TABLET ORAL DAILY
COMMUNITY
End: 2022-07-08

## 2022-05-17 RX ORDER — ATORVASTATIN CALCIUM 10 MG/1
TABLET, FILM COATED ORAL
Qty: 90 TABLET | Refills: 4 | Status: SHIPPED | OUTPATIENT
Start: 2022-05-17

## 2022-05-17 RX ORDER — LISINOPRIL 10 MG/1
10 TABLET ORAL DAILY
Qty: 90 TABLET | Refills: 4 | Status: SHIPPED | OUTPATIENT
Start: 2022-05-17

## 2022-05-17 RX ORDER — HYDRALAZINE HYDROCHLORIDE 50 MG/1
50 TABLET, FILM COATED ORAL 2 TIMES DAILY
Qty: 180 TABLET | Refills: 4 | Status: SHIPPED | OUTPATIENT
Start: 2022-05-17

## 2022-05-17 NOTE — PATIENT INSTRUCTIONS
Call Dr Naeem Calljeas office [phone]    Skip Lasix tomorrow then go back to full tablet of Lasix daily  Continue HCTZ  Labs on Monday  Follow up with Dr Steve Iqbal in 3 months

## 2022-05-17 NOTE — PROGRESS NOTES
[address]  Cardiac Follow Up     Referring Provider:  Sosa Murphy MD     Chief Complaint   Patient presents with    3 Month Follow-Up    Coronary Artery Disease    Hypertension    Hyperlipidemia        History of Present Illness:    Mr. Jasbir Ta is seen today for  follow up for management of CAD, hypertension, hyperlipidemia. He also has ODALYS uses Bipap. Radhika Chopra He is still having a very difficult time with sleep apnea. He cannot tolerate high pressures on bipap, but if he uses low pressure he cannot stay awake during the day. He was seen by our NP recently and HCTZ stopped and aldactone added. Lasix increased to full pill as well. He lost 6 lbs but labs show hyperkalemia and renal dysfunction suggestive of dehydration. Past Medical History: has a past medical history of Acute MI ([address] Back pain, CAD (coronary artery disease), GERD (gastroesophageal reflux disease), Hyperlipidemia, Hypertension, and Sleep apnea. Surgical History: has a past surgical history that includes Cardiac surgery; back surgery; Hand surgery; Gallbladder surgery; Coronary artery bypass graft; cyst removal (N/A, 1970); and Prostate biopsy (2020). Social History: reports that he quit smoking about 22 years ago. He has never used smokeless tobacco. He reports that he does not drink alcohol and does not use drugs. Family History:family history includes Cancer in his sister; Heart Attack (age of onset: 72) in his father; Heart Disease in his father and mother; Kidney Disease in his mother. Home Medications:  Prior to Visit Medications    Medication Sig Taking?  Authorizing Provider   hydroCHLOROthiazide (HYDRODIURIL) 25 MG tablet Take 25 mg by mouth daily Yes Historical Provider, MD   furosemide (LASIX) 40 MG tablet Take 0.5 tablets by mouth daily  Patient taking differently: Take 40 mg by mouth daily  Yes Jose Miguel Meyers MD   lisinopril (PRINIVIL;ZESTRIL) 10 MG tablet Take 1 tablet by mouth daily Yes Jose Miguel Meyers Provider, MD   Glucosamine-Chondroit-Biofl-Mn (GLUCOSAMINE CHONDROITIN COMPLX) CAPS Take 1 capsule by mouth daily. Yes Historical Provider, MD   Omeprazole 20 MG TBEC Take 20 mg by mouth daily. Yes Historical Provider, MD   Coenzyme Q10 (COQ10) 100 MG CAPS Take 200 mg by mouth daily Indications: 200-500 MG QD  Yes Historical Provider, MD   Multiple Vitamin (MULTIVITAMIN PO) Take 1 tablet by mouth daily. Yes Historical Provider, MD   CINNAMON PO Take 1 capsule by mouth daily  Yes Historical Provider, MD         Allergies:Carvedilol, Codeine, and Hydralazine     [x] Medications and dosages reviewed. ROS:  [x]Full ROS obtained and negative except as mentioned in HPI      Physical Examination:     /60 (Site: Left Upper Arm, Position: Sitting, Cuff Size: Large Adult)   Pulse 52   Ht 5' 5.5\" (1.664 m)   Wt 228 lb 12.8 oz (103.8 kg)   SpO2 96%   BMI 37.50 kg/m²       · GENERAL: Well developed, well nourished, No acute distress  · NEUROLOGICAL: Alert and oriented  · PSYCH: Calm affect  · SKIN: Warm and dry, no visible rash  · HEENT: Normocephalic, Sclera non-icteric, mucus membranes moist  · NECK: supple, JVP normal  · CAROTID: Normal upstroke, no bruits  · CARDIAC: Normal PMI, regular rate and rhythm, normal S1S2, No murmur, rub, or gallop  · RESPIRATORY: Normal respiratory effort, clear to auscultation bilaterally  · EXTREMITIES: No LE edema  · MUSCULOSKELETAL: No joint swelling or tenderness, no chest wall tenderness  · GASTROINTESTINAL: normal bowel sounds, soft, non-tender, no bruit       ECHO 2/2021  Summary   Normal left ventricle size, wall thickness, and systolic function with an   estimated ejection fraction of 55%. No regional wall motion abnormalities are seen. Grade III diastolic dysfunction with elevated LV filling pressures. 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 There is moderate mitral regurgitation noted.    Aortic valve appears sclerotic but opens adequately. Mild to moderate aortic   regurgitation. There is mild tricuspid regurgitation with a RVSP estimation of 44 mmHg. The right ventricle is normal in size and function. CARDIAC CATH 7/2020  LM-30%  LAD-prox 30%, mid 50%, competitive filling filling after mid vessel. LCX-occluded after OM1, Small caliber OM1 with 70% mid vessel. RCA-Occluded mid vessel  LV-mid anterior-lateral HK, EF=50%  LVEDP=10        LIMA=>LAD patent  SVG=>RCA patent  Radial=>OM Patent     IMP:  3 vessel CAD with patent grafts and mild LV dysfunction  Lateral ischemia seen on stress possibley due to OM1  Vessel is small and he has limited symptoms so will treat medically    ECHO 1/26/2022  Summary   -Normal left ventricle size, wall thickness, and systolic function with an   estimated ejection fraction of 55-60%. -No regional wall motion abnormalities are seen.   -Aortic valve appears sclerotic but opens adequately. There is   mild-to-moderate aortic insufficiency.   -Thickened mitral valve without evidence of stenosis. There is moderate   mitral regurgitation.   -There is mild pulmonic regurgitation.   -Grade III diastolic dysfunction with elevated LV filling pressures. Avg.   E/e'=14.7            ASSESSMENT:  1. Hyperlipidemia - LDL 62 Continue lipitor 10, well controlled. 2. Hypertension, -  Controlled. Follow on current therapy    /60 (Site: Left Upper Arm, Position: Sitting, Cuff Size: Large Adult)   Pulse 52   Ht 5' 5.5\" (1.664 m)   Wt 228 lb 12.8 oz (103.8 kg)   SpO2 96%   BMI 37.50 kg/m²      3. Coronary Artery Disease -- Stable. 2001 CABG. Stable. Cardiac cath 7/2020 as above. Continue medical management   4. ODALYS- Uncontrolled. Asked to contact sleep medicine again  5. Hyperkalemia: worsened with aldactone. Aldactone stopped and HCTZ restarted. On both lasix and HCTZ in past due to high K+. Recheck next week  6. Dyspnea: Needs sleep apnea controlled.    ECHO with diastolic dysfunction and moderate MR. Labs suggest  He is dry. Stop aldactone. Hold lasix one day. Labs 1 week  7. CHF: diastolic dysfunction. Needs BP and sleep apnea controlled. Regular exercise. Weight loss  8. Mitral Regurgitation: Moderate with normal LV size and function. No indication to consider MVR.  Follow with yearly ECHO    Plan:  Stop aldactone  Hold lasix 1 day  Labs 1 week  Contact sleep medicine again for options for sleep apnea    Josh Nielson M.D., [facility] [employer]

## 2022-05-23 ENCOUNTER — HOSPITAL ENCOUNTER (OUTPATIENT)
Age: 77
Discharge: HOME | End: 2022-05-23
Payer: MEDICARE

## 2022-05-23 DIAGNOSIS — R06.02 SOB (SHORTNESS OF BREATH): ICD-10-CM

## 2022-05-23 DIAGNOSIS — I50.32 CHRONIC DIASTOLIC HEART FAILURE (HCC): ICD-10-CM

## 2022-05-23 DIAGNOSIS — I50.31 ACUTE DIASTOLIC CONGESTIVE HEART FAILURE (HCC): ICD-10-CM

## 2022-05-23 LAB
ANION GAP SERPL CALCULATED.3IONS-SCNC: 15 MMOL/L (ref 3–16)
BUN BLDV-MCNC: 49 MG/DL (ref 7–20)
CALCIUM SERPL-MCNC: 9.6 MG/DL (ref 8.3–10.6)
CHLORIDE BLD-SCNC: 98 MMOL/L (ref 99–110)
CO2: 24 MMOL/L (ref 21–32)
CREAT SERPL-MCNC: 1.5 MG/DL (ref 0.8–1.3)
GFR AFRICAN AMERICAN: 55
GFR NON-AFRICAN AMERICAN: 46
GLUCOSE BLD-MCNC: 102 MG/DL (ref 70–99)
POTASSIUM SERPL-SCNC: 5.3 MMOL/L (ref 3.5–5.1)
PRO-BNP: 251 PG/ML (ref 0–449)
SODIUM BLD-SCNC: 137 MMOL/L (ref 136–145)

## 2022-05-23 PROCEDURE — 83880 ASSAY OF NATRIURETIC PEPTIDE: CPT

## 2022-05-23 PROCEDURE — 36415 COLL VENOUS BLD VENIPUNCTURE: CPT

## 2022-05-23 PROCEDURE — 80048 BASIC METABOLIC PNL TOTAL CA: CPT

## 2022-05-24 ENCOUNTER — TELEPHONE (OUTPATIENT)
Dept: CARDIOLOGY CLINIC | Age: 77
End: 2022-05-24

## 2022-05-24 NOTE — TELEPHONE ENCOUNTER
----- Message from NEO Brown - CNS sent at 5/24/2022  8:45 AM EDT -----  Orlene Patch is OOT  His potassium is up still above normal  Make sure he is not over doing or taking a potassium pill not listed on medications  thanks

## 2022-05-25 ENCOUNTER — TELEPHONE (OUTPATIENT)
Dept: CARDIOLOGY CLINIC | Age: 77
End: 2022-05-25

## 2022-05-25 DIAGNOSIS — N28.9 RENAL INSUFFICIENCY: Primary | ICD-10-CM

## 2022-05-25 NOTE — TELEPHONE ENCOUNTER
Discussed with patient. Reviewed high potassium foods. New lab order placed.        ----- Message from Mary Sharp MD sent at 5/24/2022  1:39 PM EDT -----  Kidney function slightly worse. Potassium slightly better. Continue low K+ diet. Drink a little more water.  Recheck 2 weeks    [facility]

## 2022-05-25 NOTE — TELEPHONE ENCOUNTER
[facility] also received results and instructions were reviewed with pt. see other telephone encounter regarding this.

## 2022-06-02 ENCOUNTER — TELEPHONE (OUTPATIENT)
Dept: PULMONOLOGY | Age: 77
End: 2022-06-02

## 2022-06-02 NOTE — TELEPHONE ENCOUNTER
Most recent titration sleep study recommended pressures of 19/16 to start. Overnight oximetry was completed at the pressures that he adjusted his machine to and felt he was able to tolerate, which still showed hypoxia. Most recent machine data still showing large mask leak. Most recent titration study showed he needed a full facemask to control oral leak. Is his new mask a full facemask? I recommend he continue working with his equipment company to find a mask that will seal properly. He could also consider trying a mask liner to help control the leak. Once he has a well fitting mask we can slowly begin to adjust the pressures to the recommendations of the sleep study.

## 2022-06-02 NOTE — TELEPHONE ENCOUNTER
Patient called saying Steve Olivia wanted him to call us to see if there is anything else he can do. I asked the patient if he got his mask leak under control based on last message from 04/27/2021. Patient said he did get a new mask and his machine says he is getting a good fit. He said nothing has worked on making him feel better and it has been a year. He said the pressure was changed from 14 to 25 and that has affected his eyesight and memory. He said he can only tolerate up to about 20.6. Download in chart. Please call patient at [phone].

## 2022-06-03 NOTE — TELEPHONE ENCOUNTER
Spoke with patient and he said he can tolerated the pressure right now, and he asked also if u can call him about the fitting mask and the pressures  recommended of the sleep study.

## 2022-06-03 NOTE — TELEPHONE ENCOUNTER
Called patient to discuss. Left message for him to return call. Titration study recommended pressures of IPAP max: 25, EPAP min: 16, PS: 3 which is a little higher than his current settings. Titration study showed he needed a full face mask to control the oral leak. I would recommend he work with his DME on a FFM.

## 2022-06-09 ENCOUNTER — HOSPITAL ENCOUNTER (OUTPATIENT)
Age: 77
Discharge: HOME | End: 2022-06-09
Payer: MEDICARE

## 2022-06-09 LAB — PROSTATE SPECIFIC ANTIGEN: 6.35 NG/ML (ref 0–4)

## 2022-06-09 PROCEDURE — 36415 COLL VENOUS BLD VENIPUNCTURE: CPT

## 2022-06-09 PROCEDURE — 84153 ASSAY OF PSA TOTAL: CPT

## 2022-06-14 ENCOUNTER — HOSPITAL ENCOUNTER (OUTPATIENT)
Age: 77
Discharge: HOME | End: 2022-06-14
Payer: MEDICARE

## 2022-06-14 DIAGNOSIS — N28.9 RENAL INSUFFICIENCY: ICD-10-CM

## 2022-06-14 LAB
ANION GAP SERPL CALCULATED.3IONS-SCNC: 16 MMOL/L (ref 3–16)
BUN BLDV-MCNC: 38 MG/DL (ref 7–20)
CALCIUM SERPL-MCNC: 9.6 MG/DL (ref 8.3–10.6)
CHLORIDE BLD-SCNC: 97 MMOL/L (ref 99–110)
CO2: 24 MMOL/L (ref 21–32)
CREAT SERPL-MCNC: 1.5 MG/DL (ref 0.8–1.3)
GFR AFRICAN AMERICAN: 55
GFR NON-AFRICAN AMERICAN: 46
GLUCOSE BLD-MCNC: 118 MG/DL (ref 70–99)
POTASSIUM SERPL-SCNC: 4.5 MMOL/L (ref 3.5–5.1)
SODIUM BLD-SCNC: 137 MMOL/L (ref 136–145)

## 2022-06-14 PROCEDURE — 80048 BASIC METABOLIC PNL TOTAL CA: CPT

## 2022-06-14 PROCEDURE — 36415 COLL VENOUS BLD VENIPUNCTURE: CPT

## 2022-06-15 ENCOUNTER — TELEPHONE (OUTPATIENT)
Dept: CARDIOLOGY CLINIC | Age: 77
End: 2022-06-15

## 2022-06-15 DIAGNOSIS — N28.9 RENAL INSUFFICIENCY: Primary | ICD-10-CM

## 2022-06-15 DIAGNOSIS — I10 ESSENTIAL HYPERTENSION, BENIGN: ICD-10-CM

## 2022-06-15 NOTE — TELEPHONE ENCOUNTER
Spoke with the patient and advised him of the instructions below . Patient voiced understanding .  Call complete     Repeat lab orders placed

## 2022-06-21 ENCOUNTER — HOSPITAL ENCOUNTER (OUTPATIENT)
Age: 77
Discharge: HOME | End: 2022-06-21
Payer: MEDICARE

## 2022-06-21 DIAGNOSIS — I10 ESSENTIAL HYPERTENSION, BENIGN: ICD-10-CM

## 2022-06-21 DIAGNOSIS — N28.9 RENAL INSUFFICIENCY: ICD-10-CM

## 2022-06-21 LAB
ANION GAP SERPL CALCULATED.3IONS-SCNC: 14 MMOL/L (ref 3–16)
BUN BLDV-MCNC: 34 MG/DL (ref 7–20)
CALCIUM SERPL-MCNC: 9.4 MG/DL (ref 8.3–10.6)
CHLORIDE BLD-SCNC: 97 MMOL/L (ref 99–110)
CO2: 24 MMOL/L (ref 21–32)
CREAT SERPL-MCNC: 1.4 MG/DL (ref 0.8–1.3)
GFR AFRICAN AMERICAN: 60
GFR NON-AFRICAN AMERICAN: 49
GLUCOSE BLD-MCNC: 147 MG/DL (ref 70–99)
POTASSIUM SERPL-SCNC: 5.1 MMOL/L (ref 3.5–5.1)
SODIUM BLD-SCNC: 135 MMOL/L (ref 136–145)

## 2022-06-21 PROCEDURE — 80048 BASIC METABOLIC PNL TOTAL CA: CPT

## 2022-06-21 PROCEDURE — 36415 COLL VENOUS BLD VENIPUNCTURE: CPT

## 2022-06-22 ENCOUNTER — TELEPHONE (OUTPATIENT)
Dept: CARDIOLOGY CLINIC | Age: 77
End: 2022-06-22

## 2022-06-22 DIAGNOSIS — I10 ESSENTIAL HYPERTENSION, BENIGN: Primary | ICD-10-CM

## 2022-06-22 DIAGNOSIS — I50.32 CHRONIC DIASTOLIC HEART FAILURE (HCC): ICD-10-CM

## 2022-06-22 NOTE — TELEPHONE ENCOUNTER
Patient is calling asking if he is ok to start taking an anti inflammatory. Because of his kidney labs and his heart problems. Spoke to the patient about the message below and he verbalized understanding.

## 2022-06-22 NOTE — TELEPHONE ENCOUNTER
----- Message from Capri Pond MD sent at 6/22/2022  1:15 PM EDT -----  Labs good. Repeat chem 1 month. Potassium remains high normal.  Watch K+ intake.     [facility]

## 2022-06-22 NOTE — TELEPHONE ENCOUNTER
Pt wants to know since [facility] got the results of his labs does he continue taking the hydroCHLOROthiazide (HYDRODIURIL) ?     Pls advise thank you

## 2022-07-08 ENCOUNTER — TELEPHONE (OUTPATIENT)
Dept: CARDIOLOGY CLINIC | Age: 77
End: 2022-07-08

## 2022-07-08 NOTE — TELEPHONE ENCOUNTER
Patient states his face swells when he is holding fluid. His lips and tongue are not swollen. He is more SOB. He is currently taking Lasix 40  Daily. Not on Aldactone or HCTZ. He has gained 5 lbs but not sure of time frame that was in. No other symptoms. Discussed with [facility]- Take Lasix 80 mg for 2 days and if worsens over weekend or any tongue/lip swelling go to ER. Scheduled appt with Yolis Chaudhry on Monday 7/11. Reviewed with pt. He verbalized understanding.

## 2022-07-08 NOTE — TELEPHONE ENCOUNTER
Patient called in stating that his face is swollen, and that it  started sometime between yesterday and today. Patient states it doesn't hurt. Patient is experiencing some Sob, it gets a little worse when he gets up to move around.  He states is not bad but he can notice the change,    Patient can be reachedat [phone]

## 2022-07-08 NOTE — TELEPHONE ENCOUNTER
Spoke with pt He feels like he has gained weight he does not weight himself at home. Last OV he was at he was 233. He was 228 in our office in May. He is getting more SOB. He is not taking HCTZ that was D/C 6/15. He is taking Lasix 40 mg and is not cutting it in half.

## 2022-07-11 ENCOUNTER — OFFICE VISIT (OUTPATIENT)
Dept: CARDIOLOGY CLINIC | Age: 77
End: 2022-07-11
Payer: MEDICARE

## 2022-07-11 ENCOUNTER — HOSPITAL ENCOUNTER (OUTPATIENT)
Age: 77
Discharge: HOME | End: 2022-07-11
Payer: MEDICARE

## 2022-07-11 VITALS
SYSTOLIC BLOOD PRESSURE: 132 MMHG | HEIGHT: 66 IN | BODY MASS INDEX: 36.8 KG/M2 | WEIGHT: 229 LBS | HEART RATE: 50 BPM | OXYGEN SATURATION: 95 % | DIASTOLIC BLOOD PRESSURE: 58 MMHG

## 2022-07-11 DIAGNOSIS — I50.32 CHRONIC DIASTOLIC HEART FAILURE (HCC): ICD-10-CM

## 2022-07-11 DIAGNOSIS — I10 ESSENTIAL HYPERTENSION, BENIGN: Chronic | ICD-10-CM

## 2022-07-11 DIAGNOSIS — I50.32 CHRONIC DIASTOLIC HEART FAILURE (HCC): Primary | ICD-10-CM

## 2022-07-11 DIAGNOSIS — R00.1 BRADYCARDIA: ICD-10-CM

## 2022-07-11 LAB
ANION GAP SERPL CALCULATED.3IONS-SCNC: 10 MMOL/L (ref 3–16)
BUN BLDV-MCNC: 34 MG/DL (ref 7–20)
CALCIUM SERPL-MCNC: 9.3 MG/DL (ref 8.3–10.6)
CHLORIDE BLD-SCNC: 99 MMOL/L (ref 99–110)
CO2: 28 MMOL/L (ref 21–32)
CREAT SERPL-MCNC: 1.5 MG/DL (ref 0.8–1.3)
GFR AFRICAN AMERICAN: 55
GFR NON-AFRICAN AMERICAN: 46
GLUCOSE BLD-MCNC: 121 MG/DL (ref 70–99)
POTASSIUM SERPL-SCNC: 4.8 MMOL/L (ref 3.5–5.1)
PRO-BNP: 172 PG/ML (ref 0–449)
SODIUM BLD-SCNC: 137 MMOL/L (ref 136–145)

## 2022-07-11 PROCEDURE — 1123F ACP DISCUSS/DSCN MKR DOCD: CPT | Performed by: NURSE PRACTITIONER

## 2022-07-11 PROCEDURE — 36415 COLL VENOUS BLD VENIPUNCTURE: CPT

## 2022-07-11 PROCEDURE — G8427 DOCREV CUR MEDS BY ELIG CLIN: HCPCS | Performed by: NURSE PRACTITIONER

## 2022-07-11 PROCEDURE — 1036F TOBACCO NON-USER: CPT | Performed by: NURSE PRACTITIONER

## 2022-07-11 PROCEDURE — G8417 CALC BMI ABV UP PARAM F/U: HCPCS | Performed by: NURSE PRACTITIONER

## 2022-07-11 PROCEDURE — 3017F COLORECTAL CA SCREEN DOC REV: CPT | Performed by: NURSE PRACTITIONER

## 2022-07-11 PROCEDURE — 83880 ASSAY OF NATRIURETIC PEPTIDE: CPT

## 2022-07-11 PROCEDURE — 80048 BASIC METABOLIC PNL TOTAL CA: CPT

## 2022-07-11 PROCEDURE — 99214 OFFICE O/P EST MOD 30 MIN: CPT | Performed by: NURSE PRACTITIONER

## 2022-07-11 ASSESSMENT — ENCOUNTER SYMPTOMS
SHORTNESS OF BREATH: 1
ABDOMINAL DISTENTION: 1

## 2022-07-11 NOTE — PROGRESS NOTES
size, wall thickness, and systolic function with an   estimated ejection fraction of 55%.   No regional wall motion abnormalities are seen.   Grade III diastolic dysfunction with elevated LV filling pressures.   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   There is moderate mitral regurgitation noted.   Aortic valve appears sclerotic but opens adequately. Mild to moderate aortic   regurgitation.  Rheta Dawson is mild tricuspid regurgitation with a RVSP estimation of 44 mmHg.   The right ventricle is normal in size and function.     CARDIAC CATH 7/2020  LM-30%  LAD-prox 30%, mid 50%, competitive filling filling after mid vessel. LCX-occluded after OM1, Small caliber OM1 with 70% mid vessel. RCA-Occluded mid vessel  LV-mid anterior-lateral HK, EF=50%  LVEDP=10        LIMA=>LAD patent  SVG=>RCA patent  Radial=>OM Patent     IMP:  3 vessel CAD with patent grafts and mild LV dysfunction  Lateral ischemia seen on stress possibley due to OM1  Vessel is small and he has limited symptoms so will treat medically       Activity: below baseline  Can you walk 1-2 blocks or do a moderate amount of house/yard work?no      NYHA Class: III       Sodium Restrictions: 3g  Fluid Restrictions: 48-64 oz/day  Sodium and fluid restriction compliance: over on fluids, probably also on sodium- discussed again today    Pt Education: The patient has received education on the following topics: dietary sodium restriction, heart failure medications, the importance of physical activity, symptom management and weight monitoring     ODALYS Yes Treated: Yes  Referral:  No      Past Medical History:   has a past medical history of Acute MI ([address] Back pain, CAD (coronary artery disease), GERD (gastroesophageal reflux disease), Hyperlipidemia, Hypertension, and Sleep apnea.   Surgical History:   has a past surgical history that includes Cardiac surgery; back surgery; Hand surgery; Gallbladder surgery; Coronary artery bypass graft; cyst removal (N/A, 1970); and Prostate biopsy (2020). Social History:   reports that he quit smoking about 22 years ago. He has never used smokeless tobacco. He reports that he does not drink alcohol and does not use drugs. Family History:   Family History   Problem Relation Age of Onset    Heart Attack Father 72    Heart Disease Father     Kidney Disease Mother     Heart Disease Mother     Cancer Sister        HomeMedications:  Prior to Admission medications    Medication Sig Start Date End Date Taking?  Authorizing Provider   lisinopril (PRINIVIL;ZESTRIL) 10 MG tablet Take 1 tablet by mouth daily 5/17/22  Yes Gary Prabhakar MD   hydrALAZINE (APRESOLINE) 50 MG tablet Take 1 tablet by mouth 2 times daily 5/17/22  Yes Gary Prabhakar MD   atorvastatin (LIPITOR) 10 MG tablet TAKE 1 TABLET BY MOUTH ONE TIME A DAY 5/17/22  Yes Gary Prabhakar MD   furosemide (LASIX) 40 MG tablet Take 0.5 tablets by mouth daily  Patient taking differently: Take 40 mg by mouth daily  1/26/22  Yes Gary Prabhakar MD   Turmeric 400 MG CAPS Take by mouth   Yes Historical Provider, MD   CRANBERRY EXTRACT PO Take by mouth   Yes Historical Provider, MD   Resveratrol 100 MG CAPS Take by mouth daily   Yes Historical Provider, MD   Probiotic Product (PROBIOTIC ADVANCED PO) Take by mouth   Yes Historical Provider, MD   ACETYLCARNITINE HCL PO Take by mouth   Yes Historical Provider, MD   UNABLE TO FIND circulation and vein support   Yes Historical Provider, MD   terazosin (HYTRIN) 1 MG capsule TAKE 1 CAPSULE BY MOUTH  NIGHTLY 6/22/18  Yes NEO Palomares CNP   tiZANidine (ZANAFLEX) 4 MG tablet Take 4 mg by mouth every 6 hours as needed  3/17/17  Yes Historical Provider, MD   fexofenadine (ALLEGRA) 180 MG tablet Take 180 mg by mouth daily   Yes Historical Provider, MD   Cholecalciferol (VITAMIN D3) 5000 UNITS TABS Take 1 tablet by mouth daily   Yes Historical Provider, MD   metoprolol tartrate (LOPRESSOR) 25 MG tablet Take 0.5 tablets by mouth 2 times daily 9/29/16  Yes NEO Dudley - CNP   amLODIPine (NORVASC) 10 MG tablet Take 1 tablet by mouth daily 9/23/16  Yes Talat Aguilar MD   cloNIDine (CATAPRES) 0.1 MG tablet Take 1 tablet by mouth daily  Patient taking differently: Take 0.1 mg by mouth 2 times daily  3/8/16  Yes Talat Aguilar MD   magnesium gluconate (MAGONATE) 500 MG tablet Take 2,000 mg by mouth daily    Yes Historical Provider, MD   aspirin  MG EC tablet Take 325 mg by mouth daily    Yes Historical Provider, MD   oxyCODONE (OXY-IR) 30 MG immediate release tablet Take 15 mg by mouth. 4 times a day   Yes Historical Provider, MD   testosterone cypionate (DEPOTESTOTERONE CYPIONATE) 100 MG/ML injection Inject 50 mg into the muscle. Every 3 months. Yes Historical Provider, MD   fish oil-omega-3 fatty acids 1000 MG capsule Take 1 g by mouth daily    Yes Historical Provider, MD   Glucosamine-Chondroit-Biofl-Mn (GLUCOSAMINE CHONDROITIN COMPLX) CAPS Take 1 capsule by mouth daily. Yes Historical Provider, MD   Omeprazole 20 MG TBEC Take 20 mg by mouth daily. Yes Historical Provider, MD   Coenzyme Q10 (COQ10) 100 MG CAPS Take 200 mg by mouth daily Indications: 200-500 MG QD    Yes Historical Provider, MD   Multiple Vitamin (MULTIVITAMIN PO) Take 1 tablet by mouth daily. Yes Historical Provider, MD   CINNAMON PO Take 1 capsule by mouth daily    Yes Historical Provider, MD        Allergies:  Carvedilol, Codeine, and Hydralazine     ROS:   Review of Systems   Constitutional: Positive for fatigue. Respiratory: Positive for shortness of breath. Cardiovascular: Negative. Gastrointestinal: Positive for abdominal distention. Genitourinary: Negative. Musculoskeletal: Negative. Skin: Negative. Neurological: Negative. Hematological: Negative. Psychiatric/Behavioral: Negative.         Physical Examination:    Vitals:    07/11/22 1308   BP: (!) 150/54   Pulse: 52   SpO2: 95%   Weight: 229 lb (103.9 kg)   Height: 5' 5.5\" (1.664 m)           Physical Exam  Constitutional:       Appearance: Normal appearance. HENT:      Head: Normocephalic and atraumatic. Eyes:      Extraocular Movements: Extraocular movements intact. Pupils: Pupils are equal, round, and reactive to light. Cardiovascular:      Rate and Rhythm: Regular rhythm. Bradycardia present. Pulses: Normal pulses. Heart sounds: Normal heart sounds. Pulmonary:      Effort: Pulmonary effort is normal.      Breath sounds: Normal breath sounds. Abdominal:      Palpations: Abdomen is soft. Musculoskeletal:         General: Normal range of motion. Cervical back: Normal range of motion and neck supple. Right lower leg: Edema present. Left lower leg: Edema present. Skin:     General: Skin is warm and dry. Neurological:      General: No focal deficit present. Mental Status: He is alert and oriented to person, place, and time. Mental status is at baseline. Psychiatric:         Mood and Affect: Mood normal.         Behavior: Behavior normal.         Thought Content:  Thought content normal.         Judgment: Judgment normal.         Lab Data:    CBC:   Lab Results   Component Value Date/Time    WBC 5.9 10/01/2021 02:37 PM    WBC 5.6 06/15/2021 02:52 PM    WBC 6.3 04/27/2021 01:07 PM    RBC 4.11 10/01/2021 02:37 PM    RBC 4.03 06/15/2021 02:52 PM    RBC 4.53 04/27/2021 01:07 PM    HGB 12.7 10/01/2021 02:37 PM    HGB 12.3 06/15/2021 02:52 PM    HGB 13.7 04/27/2021 01:07 PM    HCT 38.1 10/01/2021 02:37 PM    HCT 36.2 06/15/2021 02:52 PM    HCT 41.0 04/27/2021 01:07 PM    MCV 92.7 10/01/2021 02:37 PM    MCV 89.8 06/15/2021 02:52 PM    MCV 90.6 04/27/2021 01:07 PM    RDW 12.5 10/01/2021 02:37 PM    RDW 13.8 06/15/2021 02:52 PM    RDW 13.0 04/27/2021 01:07 PM     10/01/2021 02:37 PM     06/15/2021 02:52 PM     04/27/2021 01:07 PM     BMP:  Lab Results   Component Value Date/Time     06/21/2022 03:45 PM     06/14/2022 03:13 PM     05/23/2022 03:16 PM    K 5.1 06/21/2022 03:45 PM    K 4.5 06/14/2022 03:13 PM    K 5.3 05/23/2022 03:16 PM    CL 97 06/21/2022 03:45 PM    CL 97 06/14/2022 03:13 PM    CL 98 05/23/2022 03:16 PM    CO2 24 06/21/2022 03:45 PM    CO2 24 06/14/2022 03:13 PM    CO2 24 05/23/2022 03:16 PM    BUN 34 06/21/2022 03:45 PM    BUN 38 06/14/2022 03:13 PM    BUN 49 05/23/2022 03:16 PM    CREATININE 1.4 06/21/2022 03:45 PM    CREATININE 1.5 06/14/2022 03:13 PM    CREATININE 1.5 05/23/2022 03:16 PM     BNP:   Lab Results   Component Value Date/Time    PROBNP 251 05/23/2022 03:16 PM    PROBNP 552 05/13/2022 02:49 PM     Iron Studies:  No components found for: FE,  TIBC,  FERRITIN  Iron Deficiency Anemia:  No   IV Iron Therapy:  No  2017 ACC/AHA HF Guidelines:   intravenous iron replacement in patients with New York Heart Association (NYHA) class II and III HF and iron deficiency (ferritin <100 ng/ml or 100-300 ng/ml if transferrin saturation <20%), to improve functional status and QoL. Assessment/Plan:    1. SOB (shortness of breath) - continue higher dose lasix, decrease sodium and fluid intake, labs today   2.  diastolic congestive heart failure (HCC) - as above, would probalby benefit from entresto as well as SGLT2i, will look at labs first   3. Bradycardia - hold BB to see if sx improve         Instructions:   1. Medications: continue higher dose lasix but split into two doses - take on an empty stomach one before breakfast and the other between lunch and dinner, see if you can decrease sodium intake at all, hold metoprolol - call if no improvement  2. Labs: today  3. Lifestyle Recommendations: Weigh yourself every day in the morning after urination, call Keren Cancel if wt increases 2-3lb in one day or 5lb in one week, Limit sodium to 3000mg/day and fluids to 2L or 64oz/day.    4. Follow up: as scheduled with Dr. Cheko Hunt: [phone]      I appreciate the opportunity of cooperating in the care of this individual.    NEO Tong - CNP, CNP, 7/11/2022,8:51 AM

## 2022-07-12 ENCOUNTER — TELEPHONE (OUTPATIENT)
Dept: CARDIOLOGY CLINIC | Age: 77
End: 2022-07-12

## 2022-07-12 DIAGNOSIS — I25.10 ATHEROSCLEROSIS OF NATIVE CORONARY ARTERY OF NATIVE HEART WITHOUT ANGINA PECTORIS: Chronic | ICD-10-CM

## 2022-07-12 RX ORDER — FUROSEMIDE 40 MG/1
20 TABLET ORAL DAILY
Qty: 90 TABLET | Refills: 3 | OUTPATIENT
Start: 2022-07-12

## 2022-07-12 NOTE — TELEPHONE ENCOUNTER
----- Message from NEO Alvarez CNP sent at 7/12/2022  8:55 AM EDT -----  Labs do not show extra fluid and kidneys are a little stressed, decrease lasix back to 20mg once a day.  Viviana Mi

## 2022-08-04 ENCOUNTER — OFFICE VISIT (OUTPATIENT)
Dept: CARDIOLOGY CLINIC | Age: 77
End: 2022-08-04
Payer: MEDICARE

## 2022-08-04 VITALS
SYSTOLIC BLOOD PRESSURE: 136 MMHG | OXYGEN SATURATION: 94 % | BODY MASS INDEX: 37.54 KG/M2 | WEIGHT: 233.6 LBS | HEIGHT: 66 IN | DIASTOLIC BLOOD PRESSURE: 58 MMHG | HEART RATE: 58 BPM

## 2022-08-04 DIAGNOSIS — I10 ESSENTIAL HYPERTENSION, BENIGN: Chronic | ICD-10-CM

## 2022-08-04 DIAGNOSIS — G47.33 OSA (OBSTRUCTIVE SLEEP APNEA): Chronic | ICD-10-CM

## 2022-08-04 DIAGNOSIS — I34.0 NONRHEUMATIC MITRAL VALVE REGURGITATION: ICD-10-CM

## 2022-08-04 DIAGNOSIS — I50.32 CHRONIC DIASTOLIC HEART FAILURE (HCC): Primary | ICD-10-CM

## 2022-08-04 DIAGNOSIS — E87.5 HYPERKALEMIA: ICD-10-CM

## 2022-08-04 DIAGNOSIS — E78.2 MIXED HYPERLIPIDEMIA: Chronic | ICD-10-CM

## 2022-08-04 DIAGNOSIS — I25.10 ATHEROSCLEROSIS OF NATIVE CORONARY ARTERY OF NATIVE HEART WITHOUT ANGINA PECTORIS: Chronic | ICD-10-CM

## 2022-08-04 PROCEDURE — 1123F ACP DISCUSS/DSCN MKR DOCD: CPT | Performed by: INTERNAL MEDICINE

## 2022-08-04 PROCEDURE — G8427 DOCREV CUR MEDS BY ELIG CLIN: HCPCS | Performed by: INTERNAL MEDICINE

## 2022-08-04 PROCEDURE — 3017F COLORECTAL CA SCREEN DOC REV: CPT | Performed by: INTERNAL MEDICINE

## 2022-08-04 PROCEDURE — 1036F TOBACCO NON-USER: CPT | Performed by: INTERNAL MEDICINE

## 2022-08-04 PROCEDURE — G8417 CALC BMI ABV UP PARAM F/U: HCPCS | Performed by: INTERNAL MEDICINE

## 2022-08-04 PROCEDURE — 93000 ELECTROCARDIOGRAM COMPLETE: CPT | Performed by: INTERNAL MEDICINE

## 2022-08-04 PROCEDURE — 99214 OFFICE O/P EST MOD 30 MIN: CPT | Performed by: INTERNAL MEDICINE

## 2022-08-04 RX ORDER — TERAZOSIN 1 MG/1
1 CAPSULE ORAL NIGHTLY
COMMUNITY

## 2022-08-04 NOTE — PROGRESS NOTES
[facility]  Cardiac Follow Up     Referring Provider:  Oneyda Shay MD     Chief Complaint   Patient presents with    Hyperlipidemia    Shortness of Breath    Hypertension    Coronary Artery Disease    3 Month Follow-Up        History of Present Illness:    Mr. Feroz Duenas is seen today for  follow up for management of CAD, hypertension, hyperlipidemia. He also has ODALYS uses Bipap. Mehnaz Cisneros He is doing better. He is now using his CPAP. He has f/u with sleep medicine soon. Edema improved. Past Medical History: has a past medical history of Acute MI ([address] Back pain, CAD (coronary artery disease), GERD (gastroesophageal reflux disease), Hyperlipidemia, Hypertension, and Sleep apnea. Surgical History: has a past surgical history that includes Cardiac surgery; back surgery; Hand surgery; Gallbladder surgery; Coronary artery bypass graft; cyst removal (N/A, 1970); and Prostate biopsy (2020). Social History: reports that he quit smoking about 22 years ago. His smoking use included cigarettes. He has never used smokeless tobacco. He reports that he does not drink alcohol and does not use drugs. Family History:family history includes Cancer in his sister; Heart Attack (age of onset: 72) in his father; Heart Disease in his father and mother; Kidney Disease in his mother. Home Medications:  Prior to Visit Medications    Medication Sig Taking? Authorizing Provider   terazosin (HYTRIN) 1 MG capsule Take 1 mg by mouth nightly Yes Historical Provider, MD   furosemide (LASIX) 40 MG tablet Take 0.5 tablets by mouth daily  Patient taking differently: Take 40 mg by mouth in the morning.  Yes NEO Saldana - CNP   lisinopril (PRINIVIL;ZESTRIL) 10 MG tablet Take 1 tablet by mouth daily Yes Tho Zepeda MD   hydrALAZINE (APRESOLINE) 50 MG tablet Take 1 tablet by mouth 2 times daily Yes Tho Zepeda MD   atorvastatin (LIPITOR) 10 MG tablet TAKE 1 TABLET BY MOUTH ONE TIME A DAY Yes Tho Zepeda MD Turmeric 400 MG CAPS Take by mouth Yes Historical Provider, MD   CRANBERRY EXTRACT PO Take by mouth Yes Historical Provider, MD   Resveratrol 100 MG CAPS Take by mouth daily Yes Historical Provider, MD   Probiotic Product (PROBIOTIC ADVANCED PO) Take by mouth Yes Historical Provider, MD   ACETYLCARNITINE HCL PO Take by mouth Yes Historical Provider, MD   UNABLE TO FIND circulation and vein support Yes Historical Provider, MD   tiZANidine (ZANAFLEX) 4 MG tablet Take 4 mg by mouth every 6 hours as needed  Yes Historical Provider, MD   fexofenadine (ALLEGRA) 180 MG tablet Take 180 mg by mouth daily Yes Historical Provider, MD   Cholecalciferol (VITAMIN D3) 5000 UNITS TABS Take 1 tablet by mouth daily Yes Historical Provider, MD   amLODIPine (NORVASC) 10 MG tablet Take 1 tablet by mouth daily Yes Hannah Martin MD   cloNIDine (CATAPRES) 0.1 MG tablet Take 1 tablet by mouth daily  Patient taking differently: Take 0.1 mg by mouth in the morning and 0.1 mg before bedtime. Yes Hannah Martin MD   magnesium gluconate (MAGONATE) 500 MG tablet Take 2,000 mg by mouth daily  Yes Historical Provider, MD   aspirin  MG EC tablet Take 325 mg by mouth daily  Yes Historical Provider, MD   oxyCODONE (OXY-IR) 30 MG immediate release tablet Take 15 mg by mouth. 4 times a day Yes Historical Provider, MD   testosterone cypionate (DEPOTESTOTERONE CYPIONATE) 100 MG/ML injection Inject 50 mg into the muscle. Every 3 months. Yes Historical Provider, MD   fish oil-omega-3 fatty acids 1000 MG capsule Take 1 g by mouth daily  Yes Historical Provider, MD   Glucosamine-Chondroit-Biofl-Mn (GLUCOSAMINE CHONDROITIN COMPLX) CAPS Take 1 capsule by mouth daily. Yes Historical Provider, MD   Omeprazole 20 MG TBEC Take 20 mg by mouth daily.    Yes Historical Provider, MD   Coenzyme Q10 (COQ10) 100 MG CAPS Take 200 mg by mouth daily Indications: 200-500 MG QD  Yes Historical Provider, MD   Multiple Vitamin (MULTIVITAMIN PO) Take 1 tablet by mouth daily. Yes Historical Provider, MD   CINNAMON PO Take 1 capsule by mouth daily  Yes Historical Provider, MD         Allergies:Carvedilol, Codeine, and Hydralazine     [x] Medications and dosages reviewed. ROS:  [x]Full ROS obtained and negative except as mentioned in HPI      Physical Examination:     BP (!) 136/58 (Site: Left Upper Arm, Position: Sitting, Cuff Size: Medium Adult)   Pulse 58   Ht 5' 5.5\" (1.664 m)   Wt 233 lb 9.6 oz (106 kg)   SpO2 94%   BMI 38.28 kg/m²       GENERAL: Well developed, well nourished, No acute distress  NEUROLOGICAL: Alert and oriented  PSYCH: Calm affect  SKIN: Warm and dry, no visible rash  HEENT: Normocephalic, Sclera non-icteric, mucus membranes moist  NECK: supple, JVP normal  CAROTID: Normal upstroke, no bruits  CARDIAC: Normal PMI, regular rate and rhythm, normal S1S2, No murmur, rub, or gallop  RESPIRATORY: Normal respiratory effort, clear to auscultation bilaterally  EXTREMITIES: No LE edema  MUSCULOSKELETAL: No joint swelling or tenderness, no chest wall tenderness  GASTROINTESTINAL: normal bowel sounds, soft, non-tender, no bruit       ECHO 2/2021  Summary   Normal left ventricle size, wall thickness, and systolic function with an   estimated ejection fraction of 55%. No regional wall motion abnormalities are seen. Grade III diastolic dysfunction with elevated LV filling pressures. 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 There is moderate mitral regurgitation noted. Aortic valve appears sclerotic but opens adequately. Mild to moderate aortic   regurgitation. There is mild tricuspid regurgitation with a RVSP estimation of 44 mmHg. The right ventricle is normal in size and function. CARDIAC CATH 7/2020  LM-30%  LAD-prox 30%, mid 50%, competitive filling filling after mid vessel. LCX-occluded after OM1, Small caliber OM1 with 70% mid vessel.   RCA-Occluded mid vessel  LV-mid anterior-lateral HK, EF=50%  LVEDP=10 RIDDLE=>LAD patent  SVG=>RCA patent  Radial=>OM Patent     IMP:  3 vessel CAD with patent grafts and mild LV dysfunction  Lateral ischemia seen on stress possibley due to OM1  Vessel is small and he has limited symptoms so will treat medically    ECHO 1/26/2022  Summary   -Normal left ventricle size, wall thickness, and systolic function with an   estimated ejection fraction of 55-60%. -No regional wall motion abnormalities are seen.   -Aortic valve appears sclerotic but opens adequately. There is   mild-to-moderate aortic insufficiency.   -Thickened mitral valve without evidence of stenosis. There is moderate   mitral regurgitation.   -There is mild pulmonic regurgitation.   -Grade III diastolic dysfunction with elevated LV filling pressures. Avg.   E/e'=14.7        EKG:   Sinus with PACs    ASSESSMENT:  1. Hyperlipidemia - LDL 62 Continue lipitor 10, well controlled. 2. Hypertension, -  Well controlled. Follow on current therapy    BP (!) 136/58 (Site: Left Upper Arm, Position: Sitting, Cuff Size: Medium Adult)   Pulse 58   Ht 5' 5.5\" (1.664 m)   Wt 233 lb 9.6 oz (106 kg)   SpO2 94%   BMI 38.28 kg/m²      3. Coronary Artery Disease -- Stable. 2001 CABG. Stable. Cardiac cath 7/2020 as above. Continue medical management   4. ODALYS- Able to use CPAP now. Improved. F/u sleep medicine  5. Hyperkalemia: worsened with aldactone. Aldactone stopped and HCTZ restarted. On both lasix and HCTZ in past due to high K+. Improved. K=4.8. Follow  6. Dyspnea: Improved. Needs weight loss. Follow  7. CHF: diastolic dysfunction. Needs BP and sleep apnea controlled. Regular exercise. Weight loss  8. Mitral Regurgitation: Moderate with normal LV size and function. No indication to consider MVR. Follow with yearly ECHO.  Repeat 2023    Plan:  Same meds  Follow labs  F/u Sridhar Car 3 months and me 6 months  F/u sleep medicine    Minerva Robertson M.D., [facility]

## 2022-08-04 NOTE — PATIENT INSTRUCTIONS
No med changes  Limit Sodium intake  Limit Potassium in diet also  Follow up in 3 months with Aleida Jarvis NP  Follow up in 6 months

## 2022-08-22 ENCOUNTER — OFFICE VISIT (OUTPATIENT)
Dept: PULMONOLOGY | Age: 77
End: 2022-08-22
Payer: MEDICARE

## 2022-08-22 VITALS
OXYGEN SATURATION: 96 % | HEART RATE: 75 BPM | DIASTOLIC BLOOD PRESSURE: 50 MMHG | HEIGHT: 66 IN | SYSTOLIC BLOOD PRESSURE: 152 MMHG | WEIGHT: 236.4 LBS | BODY MASS INDEX: 37.99 KG/M2

## 2022-08-22 DIAGNOSIS — E66.9 NON MORBID OBESITY, UNSPECIFIED OBESITY TYPE: ICD-10-CM

## 2022-08-22 DIAGNOSIS — I25.10 ATHEROSCLEROSIS OF NATIVE CORONARY ARTERY OF NATIVE HEART WITHOUT ANGINA PECTORIS: Chronic | ICD-10-CM

## 2022-08-22 DIAGNOSIS — I10 ESSENTIAL HYPERTENSION, BENIGN: Chronic | ICD-10-CM

## 2022-08-22 DIAGNOSIS — G47.33 OSA (OBSTRUCTIVE SLEEP APNEA): Primary | Chronic | ICD-10-CM

## 2022-08-22 DIAGNOSIS — I50.32 CHRONIC DIASTOLIC HEART FAILURE (HCC): ICD-10-CM

## 2022-08-22 PROCEDURE — 3017F COLORECTAL CA SCREEN DOC REV: CPT | Performed by: NURSE PRACTITIONER

## 2022-08-22 PROCEDURE — 1036F TOBACCO NON-USER: CPT | Performed by: NURSE PRACTITIONER

## 2022-08-22 PROCEDURE — G8427 DOCREV CUR MEDS BY ELIG CLIN: HCPCS | Performed by: NURSE PRACTITIONER

## 2022-08-22 PROCEDURE — G8417 CALC BMI ABV UP PARAM F/U: HCPCS | Performed by: NURSE PRACTITIONER

## 2022-08-22 PROCEDURE — 1123F ACP DISCUSS/DSCN MKR DOCD: CPT | Performed by: NURSE PRACTITIONER

## 2022-08-22 PROCEDURE — 99214 OFFICE O/P EST MOD 30 MIN: CPT | Performed by: NURSE PRACTITIONER

## 2022-08-22 ASSESSMENT — SLEEP AND FATIGUE QUESTIONNAIRES
HOW LIKELY ARE YOU TO NOD OFF OR FALL ASLEEP WHILE LYING DOWN TO REST IN THE AFTERNOON WHEN CIRCUMSTANCES PERMIT: 0
HOW LIKELY ARE YOU TO NOD OFF OR FALL ASLEEP WHEN YOU ARE A PASSENGER IN A CAR FOR AN HOUR WITHOUT A BREAK: 0
HOW LIKELY ARE YOU TO NOD OFF OR FALL ASLEEP WHILE WATCHING TV: 2
HOW LIKELY ARE YOU TO NOD OFF OR FALL ASLEEP WHILE SITTING QUIETLY AFTER LUNCH WITHOUT ALCOHOL: 3
HOW LIKELY ARE YOU TO NOD OFF OR FALL ASLEEP WHILE SITTING AND READING: 2
HOW LIKELY ARE YOU TO NOD OFF OR FALL ASLEEP WHILE SITTING INACTIVE IN A PUBLIC PLACE: 0
HOW LIKELY ARE YOU TO NOD OFF OR FALL ASLEEP WHILE SITTING AND TALKING TO SOMEONE: 0
HOW LIKELY ARE YOU TO NOD OFF OR FALL ASLEEP IN A CAR, WHILE STOPPED FOR A FEW MINUTES IN TRAFFIC: 0
ESS TOTAL SCORE: 7

## 2022-08-22 NOTE — PROGRESS NOTES
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) [] CSA (G47.31) [] Apnea (G47.30)   Length of Need: [x] 15 Months [] 99 Months [] Other:   Machine (LAYO!): [] Respironics Dream Station      Auto [] ResMed AirSense     Auto [] Other:     []  CPAP () [] Bilevel ()   Mode: [] Auto [] Spontaneous    Mode: [] Auto [] Spontaneous             Comfort Settings:      Humidifier: [] Heated ()        [x] Water chamber replacement ()/ 1 per 6 months        Mask:   [] Nasal () /1 per 3 months [x] Full Face () /1 per 3 months   [] Patient choice -Size and fit mask [x] Patient Choice - Size and fit mask   [] Dispense: [] Dispense:   [] Headgear () / 1 per 3 months [x] Headgear () / 1 per 3 months   [] Replacement Nasal Cushion ()/2 per month [x] Interface Replacement ()/1 per month   [] Replacement Nasal Pillows ()/2 per month         Tubing: [x] Heated ()/1 per 3 months    [] Standard ()/1 per 3 months [] Other:           Filters: [x] Non-disposable ()/1 per 6 months     [x] Ultra-Fine, Disposable ()/2 per month        Miscellaneous: [] Chin Strap ()/ 1 per 6 months [] O2 bleed-in:        LPM   [] Oxymetry on CPAP/Bilevel [x]  Other: Please contact patient to schedule an in person mask fitting. Please assess mask fit with machine on and bite guard in place. Start Order Date: 08/22/22    MEDICAL JUSTIFICATION:  I, the undersigned, certify that the above prescribed supplies are medically necessary for this patients wellbeing. In my opinion, the supplies are both reasonable and necessary in reference to accepted standards of medicalpractice in treatment of this patients condition. Santiago Shelton NP    NPI: [id number]       Order Signed Date: 08/22/22  [address] [place] [address]  Pulmonary, Sleep, and Critical Care    Pulmonary, Sleep, and Critical Care  [address] [facility].  [address], [address], [address] [facility] [place]  Phone: [phone]    Fax: [phone]    Roxy Jeana  1945  [address] [place] [address]  [phone] (home)   [phone] (mobile)      Insurance Info (confirm with patient if correct):  Payer/Plan Subscr  Sex Relation Sub.  Ins. ID Effective Group Num

## 2022-08-22 NOTE — LETTER
[facility] Sleep Medicine  [address] [facility]  [facility] [address]  Phone: [phone]  Fax: [phone]    August 22, 2022       Patient: Nereyda Norton   MR Number: [id number]   YOB: 1945   Date of Visit: 8/22/2022       Nereyda Norton was seen for a follow up visit today. Here is my assessment and plan as well as an attached copy of his visit today:    Coronary Artery Disease   Chronic- Stable. Discussed the importance of treating obstructive sleep apnea as part of the management of this disorder. Cont any meds per PCP and other physicians. Chronic diastolic heart failure (HCC)  Chronic- Stable. Discussed the importance of treating obstructive sleep apnea as part of the management of this disorder. Cont any meds per PCP and other physicians. Essential hypertension, benign   Chronic- Stable. Discussed the importance of treating obstructive sleep apnea as part of the management of this disorder. Cont any meds per PCP and other physicians. Non morbid obesity, unspecified obesity type  Chronic-not stable:  Discussed importance of treating obstructive sleep apnea and getting sufficient sleep to assist with weight control. Encouraged him to work on weight loss through diet and exercise. Recommended DASH or Mediterranean diets. ODALYS (obstructive sleep apnea)   Chronic-Stable: Reviewed and analyzed results of physiologic download from patient's machine and reviewed with patient. Supplies and parts as needed for his machine. These are medically necessary. Limit caffeine use after 3pm. Based on the analyzed data will continue with current settings. Encouraged him to work with his equipment company on mask fit. Discussed trying a mask liner and provided information for him to view mask liners. Discussed with patient that ODALYS appears well controlled and discussed his medications can also attribute to daytime symptoms.  Vital signs stable at the time of visit and heart/ lungs revealed no abnormalities on auscultation with regular rhythm and effort. Encouraged him to contact cardiology to discuss symptoms and to seek emergent care if symptoms worsen or fail to improve. Will see him back in 3 months. Encouraged him to contact the office with any questions or concerns. If you have questions or concerns, please do not hesitate to call me. I look forward to following [address] [facility] along with you.     Sincerely,    NEO Reddy    CC providers:  Abeba Cutler MD  [address] [place] [address] [address] [address]

## 2022-08-22 NOTE — ASSESSMENT & PLAN NOTE
Chronic-Stable: Reviewed and analyzed results of physiologic download from patient's machine and reviewed with patient. Supplies and parts as needed for his machine. These are medically necessary. Limit caffeine use after 3pm. Based on the analyzed data will continue with current settings. Encouraged him to work with his equipment company on mask fit. Discussed trying a mask liner and provided information for him to view mask liners. Discussed with patient that ODALYS appears well controlled and discussed his medications can also attribute to daytime symptoms. Vital signs stable at the time of visit and heart/ lungs revealed no abnormalities on auscultation with regular rhythm and effort. Encouraged him to contact cardiology to discuss symptoms and to seek emergent care if symptoms worsen or fail to improve. Will see him back in 3 months. Encouraged him to contact the office with any questions or concerns.

## 2022-08-22 NOTE — PROGRESS NOTES
Jd Becerril MD  Chalneal Monday CNP [facility]und[facility] Ochoa  [address] [facility]  Aundrea Ochoa, [address] [facility]- [phone]   [facility] [address] Silviano Prado. [address] [place] [address]. [address] [phone]     [address] [address]  [phone]      Assessment/Plan:      1. ODALYS (obstructive sleep apnea)  Assessment & Plan:   Chronic-Stable: Reviewed and analyzed results of physiologic download from patient's machine and reviewed with patient. Supplies and parts as needed for his machine. These are medically necessary. Limit caffeine use after 3pm. Based on the analyzed data will continue with current settings. Encouraged him to work with his equipment company on mask fit. Discussed trying a mask liner and provided information for him to view mask liners. Discussed with patient that ODALYS appears well controlled and discussed his medications can also attribute to daytime symptoms. Vital signs stable at the time of visit and heart/ lungs revealed no abnormalities on auscultation with regular rhythm and effort. Encouraged him to contact cardiology to discuss symptoms and to seek emergent care if symptoms worsen or fail to improve. Will see him back in 3 months. Encouraged him to contact the office with any questions or concerns. 2. Atherosclerosis of native coronary artery of native heart without angina pectoris  Assessment & Plan:   Chronic- Stable. Discussed the importance of treating obstructive sleep apnea as part of the management of this disorder. Cont any meds per PCP and other physicians. 3. Chronic diastolic heart failure (HCC)  Assessment & Plan:  Chronic- Stable. Discussed the importance of treating obstructive sleep apnea as part of the management of this disorder. Cont any meds per PCP and other physicians. 4. Essential hypertension, benign  Assessment & Plan:   Chronic- Stable.   Discussed the importance of treating obstructive sleep apnea as part of the management of this disorder. Cont any meds per PCP and other physicians. 5. Non morbid obesity, unspecified obesity type  Assessment & Plan:  Chronic-not stable:  Discussed importance of treating obstructive sleep apnea and getting sufficient sleep to assist with weight control. Encouraged him to work on weight loss through diet and exercise. Recommended DASH or Mediterranean diets. Reviewed, analyzed, and documented physiologic data from patient's PAP machine. This information was analyzed to assess complexity and medical decision making in regards to further testing and management. The primary encounter diagnosis was ODALYS (obstructive sleep apnea). Diagnoses of Atherosclerosis of native coronary artery of native heart without angina pectoris, Chronic diastolic heart failure ([facility] [address] Essential hypertension, benign, and Non morbid obesity, unspecified obesity type were also pertinent to this visit. The chronic medical conditions listed are directly related to the primary diagnosis listed above. The management of the primary diagnosis affects the secondary diagnosis and vice versa. Subjective:   Subjective   Patient ID: Tana Morales is a 78 y.o. male. Chief Complaint   Patient presents with    Sleep Apnea       HPI:  Machine Modem/Download Info:  Compliance (hours/night): 9.8 hrs/night  % of nights >= 4 hrs: 100 %  Download AHI (/hour): 4.8 /HR   Average IPAP Pressure: 18.8 cmH2O  Average EPAP Pressure: 16.7 cmH2O               AUTO BILEVEL - Settings (ResMed)  IPAP Max: 25 cmH2O  EPAP Min: 16 cmH2O  Pressure Support: 2         PAP Mask  Mask Type: Full Face mask     Tana Morales reports he is doing well with his machine. The pressure on his machine is comfortable.   An overnight oximetry with his BiPAP was completed on 4/25/22 which was normal.  He reports he recently saw cardiology and was recommended to follow-up due to capsule, Oral, DAILY    cloNIDine (CATAPRES) 0.1 mg, Oral, DAILY    CoQ10 200 mg, Oral, DAILY    CRANBERRY EXTRACT PO Oral    fexofenadine (ALLEGRA) 180 mg, Oral, DAILY    fish oil-omega-3 fatty acids 1 g, Oral, DAILY    furosemide (LASIX) 20 mg, Oral, DAILY    Glucosamine-Chondroit-Biofl-Mn (GLUCOSAMINE CHONDROITIN COMPLX) CAPS 1 capsule, DAILY    hydrALAZINE (APRESOLINE) 50 mg, Oral, 2 TIMES DAILY    lisinopril (PRINIVIL;ZESTRIL) 10 mg, Oral, DAILY    magnesium gluconate (MAGONATE) 2,000 mg, Oral, DAILY    Multiple Vitamin (MULTIVITAMIN PO) 1 tablet, DAILY    omeprazole 20 mg, DAILY    oxyCODONE (OXY-IR) 15 mg, Oral, 4 times a day    Probiotic Product (PROBIOTIC ADVANCED PO) Oral    Resveratrol 100 MG CAPS Oral, DAILY    terazosin (HYTRIN) 1 mg, Oral, NIGHTLY    testosterone cypionate (DEPOTESTOTERONE CYPIONATE) 50 mg, IntraMUSCular, Every 3 months    tiZANidine (ZANAFLEX) 4 mg, Oral, EVERY 6 HOURS PRN    Turmeric 400 MG CAPS Oral    UNABLE TO FIND circulation and vein support           Vitals:  Weight BMI   Wt Readings from Last 3 Encounters:   08/22/22 236 lb 6.4 oz (107.2 kg)   08/04/22 233 lb 9.6 oz (106 kg)   07/11/22 229 lb (103.9 kg)    Body mass index is 38.74 kg/m².      BP HR SaO2   BP Readings from Last 3 Encounters:   08/22/22 (!) 152/50   08/04/22 (!) 136/58   07/11/22 (!) 132/58    Pulse Readings from Last 3 Encounters:   08/22/22 75   08/04/22 58   07/11/22 50    SpO2 Readings from Last 3 Encounters:   08/22/22 96%   08/04/22 94%   07/11/22 95%        Electronically signed by NEO Reddy on 8/22/2022 at 5:34 PM

## 2022-09-06 ENCOUNTER — TELEPHONE (OUTPATIENT)
Dept: PULMONOLOGY | Age: 77
End: 2022-09-06

## 2022-10-18 ENCOUNTER — TELEPHONE (OUTPATIENT)
Dept: CARDIOLOGY CLINIC | Age: 77
End: 2022-10-18

## 2022-10-18 NOTE — TELEPHONE ENCOUNTER
Pt stopped in today to advise he just left his pcp and was told he is in afib. The pcp started him on Eliquis and Dietiazem and wants him to stop taking amlodypine. Pt also states his pcp has him stop taking aspirin 7 days prior to each of his testosterone treatments. Pt has started taking the vitamin PQQ 20 mg. Pt wants to know if [facility] is okay with all of these changes. Please call pt to advise about the vitamin because he thinks it may have put him in afib, and advise of med changes. Thank you.

## 2022-10-19 ENCOUNTER — TELEPHONE (OUTPATIENT)
Dept: CARDIOLOGY CLINIC | Age: 77
End: 2022-10-19

## 2022-10-19 NOTE — TELEPHONE ENCOUNTER
Called Dr Fran Paredes office and spoke to Jamin Notice and she will fax over the EKG tracing now to [address] [place] [address] office for [facility] review.

## 2022-10-19 NOTE — TELEPHONE ENCOUNTER
Called patient and LVM- okay per HIPAA- that [address] [place] [address] [place] reviewed his EKG and he is in Afib. He agrees with Eliquis and Cardizem. He does not think his vitamins are causing the issue. Patient needs to see EP for evaluation. Pt advised to call for any further questions or concerns and that EP will be calling him to schedule appt.

## 2022-10-21 NOTE — TELEPHONE ENCOUNTER
Pt called back to schedule with CMV, but states he has a lot of health issues and needs an afternoon appt.  Please advise

## 2022-10-27 NOTE — TELEPHONE ENCOUNTER
Pt called to schedule on 11/17 at 2:15 that space is taken. Please see where the Pt can be scheduled in at.   Please advise

## 2022-11-02 ENCOUNTER — OFFICE VISIT (OUTPATIENT)
Dept: CARDIOLOGY CLINIC | Age: 77
End: 2022-11-02
Payer: MEDICARE

## 2022-11-02 VITALS
SYSTOLIC BLOOD PRESSURE: 140 MMHG | BODY MASS INDEX: 37.69 KG/M2 | HEART RATE: 65 BPM | OXYGEN SATURATION: 95 % | WEIGHT: 230 LBS | DIASTOLIC BLOOD PRESSURE: 54 MMHG

## 2022-11-02 DIAGNOSIS — I10 ESSENTIAL HYPERTENSION, BENIGN: Chronic | ICD-10-CM

## 2022-11-02 DIAGNOSIS — I50.32 CHRONIC DIASTOLIC HEART FAILURE (HCC): ICD-10-CM

## 2022-11-02 DIAGNOSIS — I48.91 NEW ONSET A-FIB (HCC): Primary | ICD-10-CM

## 2022-11-02 DIAGNOSIS — E66.9 NON MORBID OBESITY, UNSPECIFIED OBESITY TYPE: ICD-10-CM

## 2022-11-02 DIAGNOSIS — I25.10 ATHEROSCLEROSIS OF NATIVE CORONARY ARTERY OF NATIVE HEART WITHOUT ANGINA PECTORIS: Chronic | ICD-10-CM

## 2022-11-02 DIAGNOSIS — G47.33 OSA (OBSTRUCTIVE SLEEP APNEA): Chronic | ICD-10-CM

## 2022-11-02 PROCEDURE — G8417 CALC BMI ABV UP PARAM F/U: HCPCS | Performed by: INTERNAL MEDICINE

## 2022-11-02 PROCEDURE — 99204 OFFICE O/P NEW MOD 45 MIN: CPT | Performed by: INTERNAL MEDICINE

## 2022-11-02 PROCEDURE — 3017F COLORECTAL CA SCREEN DOC REV: CPT | Performed by: INTERNAL MEDICINE

## 2022-11-02 PROCEDURE — 3078F DIAST BP <80 MM HG: CPT | Performed by: INTERNAL MEDICINE

## 2022-11-02 PROCEDURE — 93000 ELECTROCARDIOGRAM COMPLETE: CPT | Performed by: INTERNAL MEDICINE

## 2022-11-02 PROCEDURE — G8427 DOCREV CUR MEDS BY ELIG CLIN: HCPCS | Performed by: INTERNAL MEDICINE

## 2022-11-02 PROCEDURE — G8484 FLU IMMUNIZE NO ADMIN: HCPCS | Performed by: INTERNAL MEDICINE

## 2022-11-02 PROCEDURE — 1123F ACP DISCUSS/DSCN MKR DOCD: CPT | Performed by: INTERNAL MEDICINE

## 2022-11-02 PROCEDURE — 3074F SYST BP LT 130 MM HG: CPT | Performed by: INTERNAL MEDICINE

## 2022-11-02 PROCEDURE — 1036F TOBACCO NON-USER: CPT | Performed by: INTERNAL MEDICINE

## 2022-11-02 RX ORDER — DILTIAZEM HYDROCHLORIDE 240 MG/1
CAPSULE, EXTENDED RELEASE ORAL
COMMUNITY
Start: 2022-10-19

## 2022-11-02 NOTE — PROGRESS NOTES
[facility]   Electrophysiology Consultation   Date: 11/2/2022  Reason for Consultation: Atrial Fibrillation   Consult Requesting Physician: Beltran Golden    Chief Complaint   Patient presents with    New Patient     Pt reports SOB and irregular heart rhythm. CC: Afib   HPI: Carina Martin is a 78 y.o. male with a PMH of HTN, CAD, obesity, ODALYS and Afib    10/18/2022 patient had an ECG that showed new onset atrial fibrillation. Kimberly Abrams started patient on Eliquis and diltiazem. Interval History:  Mala De La O presents to the office as a new patient. He was not aware of his irregular rhythm. Does attest to having fatigue that has been ongoing for a long time now. Also complains of some shortness of breath. With minimal activity he does attest to having palpitations and increased heart rate. He does not drink alcohol. Assessment and plan:   New Onset Afib   -ECG today shows sinus rhythm    -Patient has a [id number] Score of at least 4 (age1, HTN, CAD), PCP started Eliquis 5 mg BID   -Continue Diltiazem 240 mg daily    -Afib risk factors including age, HTN, obesity, inactivity and ODALYS were discussed with patient. Risk factor modification recommended. All questions were answered.   -We will do a monitor to evaluate his burden if his burden is low we will not change anything. If he has a high burden we discussed other treatment options including antiarrhythmics vs ablation  -Discussed in detail about the risk factors, pathophysiology, and treatment options including life style modifications for atrial fibrillation. Eat heart healthy diet  Minimize alcohol intake  Minimize caffeine and soda   Keep your blood pressure under control. Keep your blood sugar under control. Stop smoking  Be active, keep your body weight optimal  Exercise on a regular basis  Manage your stress   If you snore, get evaluated for sleep apnea  Be aware of the symptoms of stroke    We discussed lifestyle modification. We will do a 30-day event monitor to assess for burden. If the burden is low, no further action is necessary for now. If the burden is high, we can consider antiarrhythmic or ablation. However given his low heart rate, antiarrhythmic drugs may not be the best option. CAD   -Managed by [address]   -[address] [facility] 2020 showed patent grafts    -CABG 2082    Diastolic CHF   -Managed by the HF team   -EF 55-60% per echo 1/26/2022   -Continue medical management with lasix, and lisinopril    HTN  -Not well controlled: 479/66, diastolic has been low recently but systolic has been high. Will continue follow up with General cardiology   -BP goal <130/80  -Home BP monitoring encouraged, printed information provided on how to accurately measure BP at home.  -Counseled to follow a low salt diet to assure blood pressure remains controlled for cardiovascular risk factor modification.   -The patient is counseled to get regular exercise 3-5 times per week and maintain a healthy weight reduce cardiovascular risk factors. Obesity  Body mass index is 37.69 kg/m². -Excessive weight is complicating assessment and treatment.  It is placing patient at risk for multiple co-morbidities as well as early death and contributing to the patient's presentation.  -Discussed weight management with diet and exercise      ODALYS  -Stable: Uses CPAP/Bipap/APAP  -Encourage to use machine to prevent long term effects of untreated ODALYS      Plan:   30 day monitor to evaluate his burden   March 2023    Patient Active Problem List    Diagnosis Date Noted    Essential hypertension, benign 09/06/2011    New onset a-fib ([facility] [address] 11/02/2022    Bradycardia 07/11/2022    Mitral regurgitation 05/17/2022    Chronic diastolic heart failure ([address] 05/17/2022    HLD (hyperlipidemia) 09/06/2011    Coronary Artery Disease 09/06/2011    Non morbid obesity, unspecified obesity type 08/10/2021    Abnormal cardiovascular stress test     Hyperkalemia 06/30/2020    Sebaceous cyst 08/06/2015    ODALYS (obstructive sleep apnea) 04/01/2013     Diagnostic studies:   ECG 11/2/22  Sinus rhythm   396 QTcH, 96 QRS    Echo 1/26/2022  -Normal left ventricle size, wall thickness, and systolic function with an estimated ejection fraction of 55-60%. -No regional wall motion abnormalities are seen.  -Aortic valve appears sclerotic but opens adequately. There is mild-to-moderate aortic insufficiency.  -Thickened mitral valve without evidence of stenosis. There is moderate mitral regurgitation.  -There is mild pulmonic regurgitation.  -Grade III diastolic dysfunction with elevated LV filling pressures. Avg. E/e'=14.7    Echo 2/26/2021   Normal left ventricle size, wall thickness, and systolic function with an   estimated ejection fraction of 55%. No regional wall motion abnormalities are seen. Grade III diastolic dysfunction with elevated LV filling pressures. 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 There is moderate mitral regurgitation noted. Aortic valve appears sclerotic but opens adequately. Mild to moderate aortic   regurgitation. There is mild tricuspid regurgitation with a RVSP estimation of 44 mmHg. The right ventricle is normal in size and function. [facility] 7/8/2020  LM-30%  LAD-prox 30%, mid 50%, competitive filling filling after mid vessel. LCX-occluded after OM1, Small caliber OM1 with 70% mid vessel. RCA-Occluded mid vessel  LV-mid anterior-lateral HK, EF=50%  LVEDP=10     LIMA=>LAD patent  SVG=>RCA patent  Radial=>OM Patent     IMP:  3 vessel CAD with patent grafts and mild LV dysfunction  Lateral ischemia seen on stress possibley due to OM1  Vessel is small and he has limited symptoms so will treat medically     PLAN:  Continue medical therapy  F/u 6 months     Stress Test 6/12/2020   Summary    There is a moderate sized mild intensity perfusion defect in the lateral    wall of the left ventricle consistent with ischemia.     The LVEF is 54% with normal LV wall motion on gated imaging and TID was    normal at 0.98. The Sum difference score of 6 is abnormal.        This is an intermediate risk study. I independently reviewed the cardiac diagnostic studies, ECG and relevant imaging studies. Lab Results   Component Value Date    LVEF 58 01/26/2022    LVEFMODE Cardiac Cath 07/08/2020     Lab Results   Component Value Date    TSH 2.52 04/27/2021       Physical Examination:  Vitals:    11/02/22 1431   BP: (!) 140/54   Pulse: 65   SpO2: 95%      Wt Readings from Last 3 Encounters:   11/02/22 230 lb (104.3 kg)   08/22/22 236 lb 6.4 oz (107.2 kg)   08/04/22 233 lb 9.6 oz (106 kg)       Constitutional: Oriented. No distress. Head: Normocephalic and atraumatic. Mouth/Throat: Oropharynx is clear and moist.   Eyes: Conjunctivae normal. EOM are normal.   Neck: Neck supple. No rigidity. No JVD present. Cardiovascular: Normal rate, regular rhythm, S1&S2. Pulmonary/Chest: Bilateral respiratory sounds. No wheezes, No rhonchi. Abdominal: Soft. Bowel sounds present. No distension, No tenderness. Musculoskeletal: No tenderness. No edema    Lymphadenopathy: Has no cervical adenopathy. Neurological: Alert and oriented. Cranial nerve appears intact, No Gross deficit   Skin: Skin is warm and dry. No rash noted. Psychiatric: Has a normal behavior       Review of System:  [x] Full ROS obtained and negative except as mentioned in HPI    Prior to Admission medications    Medication Sig Start Date End Date Taking?  Authorizing Provider   dilTIAZem [facility]) 240 MG extended release capsule  10/19/22  Yes Historical Provider, MD   terazosin (HYTRIN) 1 MG capsule Take 1 mg by mouth nightly   Yes Historical Provider, MD   furosemide (LASIX) 40 MG tablet Take 0.5 tablets by mouth daily  Patient taking differently: Take 40 mg by mouth daily 7/12/22  Yes NEO Redd CNP   lisinopril (PRINIVIL;ZESTRIL) 10 MG tablet Take 1 tablet by mouth daily 5/17/22 Yes Beltran David MD   hydrALAZINE (APRESOLINE) 50 MG tablet Take 1 tablet by mouth 2 times daily 5/17/22  Yes Beltran David MD   atorvastatin (LIPITOR) 10 MG tablet TAKE 1 TABLET BY MOUTH ONE TIME A DAY 5/17/22  Yes Beltran aDvid MD   Turmeric 400 MG CAPS Take by mouth   Yes Historical Provider, MD   CRANBERRY EXTRACT PO Take by mouth   Yes Historical Provider, MD   Resveratrol 100 MG CAPS Take by mouth daily   Yes Historical Provider, MD   Probiotic Product (PROBIOTIC ADVANCED PO) Take by mouth   Yes Historical Provider, MD   UNABLE TO FIND circulation and vein support   Yes Historical Provider, MD   tiZANidine (ZANAFLEX) 4 MG tablet Take 4 mg by mouth every 6 hours as needed  3/17/17  Yes Historical Provider, MD   fexofenadine (ALLEGRA) 180 MG tablet Take 180 mg by mouth daily   Yes Historical Provider, MD   Cholecalciferol (VITAMIN D3) 5000 UNITS TABS Take 1 tablet by mouth daily   Yes Historical Provider, MD   cloNIDine (CATAPRES) 0.1 MG tablet Take 1 tablet by mouth daily  Patient taking differently: Take 0.1 mg by mouth 2 times daily 3/8/16  Yes Beltran David MD   magnesium gluconate (MAGONATE) 500 MG tablet Take 2,000 mg by mouth daily    Yes Historical Provider, MD   oxyCODONE (OXY-IR) 30 MG immediate release tablet Take 15 mg by mouth. 4 times a day   Yes Historical Provider, MD   testosterone cypionate (DEPOTESTOTERONE CYPIONATE) 100 MG/ML injection Inject 50 mg into the muscle. Every 3 months. Yes Historical Provider, MD   fish oil-omega-3 fatty acids 1000 MG capsule Take 1 g by mouth daily    Yes Historical Provider, MD   Glucosamine-Chondroit-Biofl-Mn (GLUCOSAMINE CHONDROITIN COMPLX) CAPS Take 1 capsule by mouth daily. Yes Historical Provider, MD   Omeprazole 20 MG TBEC Take 20 mg by mouth daily.      Yes Historical Provider, MD   Coenzyme Q10 (COQ10) 100 MG CAPS Take 200 mg by mouth daily Indications: 200-500 MG QD    Yes Historical Provider, MD   Multiple Vitamin (MULTIVITAMIN PO) Take 1 control cardiovascular risk factors. - The patient is counseled to lose weigt to control cardiovascular risk factors. All questions and concerns were addressed to the patient/family. Alternatives to my treatment were discussed. I have discussed the above stated plan and the patient verbalized understanding and agreed with the plan. I, Dr. Brianna Sharp personally performed the services described in this documentation as scribed by RN in my presence, and it is both accurate and complete. NOTE: This report was transcribed using voice recognition software. Every effort was made to ensure accuracy, however, inadvertent computerized transcription errors may be present.      Brianna Sharp MD, [place], [address] [place] [address]   Office: [phone]  Fax: [phone]

## 2022-11-15 ENCOUNTER — OFFICE VISIT (OUTPATIENT)
Dept: CARDIOLOGY CLINIC | Age: 77
End: 2022-11-15
Payer: MEDICARE

## 2022-11-15 VITALS
OXYGEN SATURATION: 96 % | HEIGHT: 66 IN | SYSTOLIC BLOOD PRESSURE: 134 MMHG | DIASTOLIC BLOOD PRESSURE: 58 MMHG | BODY MASS INDEX: 36.45 KG/M2 | WEIGHT: 226.8 LBS | HEART RATE: 64 BPM

## 2022-11-15 DIAGNOSIS — I48.0 PAF (PAROXYSMAL ATRIAL FIBRILLATION) (HCC): ICD-10-CM

## 2022-11-15 DIAGNOSIS — I10 PRIMARY HYPERTENSION: Primary | ICD-10-CM

## 2022-11-15 DIAGNOSIS — I25.10 ATHEROSCLEROSIS OF NATIVE CORONARY ARTERY OF NATIVE HEART WITHOUT ANGINA PECTORIS: ICD-10-CM

## 2022-11-15 DIAGNOSIS — I50.32 CHRONIC DIASTOLIC HEART FAILURE (HCC): ICD-10-CM

## 2022-11-15 PROCEDURE — 1123F ACP DISCUSS/DSCN MKR DOCD: CPT | Performed by: NURSE PRACTITIONER

## 2022-11-15 PROCEDURE — G8417 CALC BMI ABV UP PARAM F/U: HCPCS | Performed by: NURSE PRACTITIONER

## 2022-11-15 PROCEDURE — 1036F TOBACCO NON-USER: CPT | Performed by: NURSE PRACTITIONER

## 2022-11-15 PROCEDURE — 99214 OFFICE O/P EST MOD 30 MIN: CPT | Performed by: NURSE PRACTITIONER

## 2022-11-15 PROCEDURE — 3017F COLORECTAL CA SCREEN DOC REV: CPT | Performed by: NURSE PRACTITIONER

## 2022-11-15 PROCEDURE — 3074F SYST BP LT 130 MM HG: CPT | Performed by: NURSE PRACTITIONER

## 2022-11-15 PROCEDURE — G8484 FLU IMMUNIZE NO ADMIN: HCPCS | Performed by: NURSE PRACTITIONER

## 2022-11-15 PROCEDURE — 3078F DIAST BP <80 MM HG: CPT | Performed by: NURSE PRACTITIONER

## 2022-11-15 PROCEDURE — G8427 DOCREV CUR MEDS BY ELIG CLIN: HCPCS | Performed by: NURSE PRACTITIONER

## 2022-11-28 ENCOUNTER — OFFICE VISIT (OUTPATIENT)
Dept: PULMONOLOGY | Age: 77
End: 2022-11-28
Payer: MEDICARE

## 2022-11-28 VITALS
HEIGHT: 66 IN | HEART RATE: 68 BPM | DIASTOLIC BLOOD PRESSURE: 50 MMHG | BODY MASS INDEX: 37.16 KG/M2 | SYSTOLIC BLOOD PRESSURE: 148 MMHG | WEIGHT: 231.2 LBS | OXYGEN SATURATION: 93 %

## 2022-11-28 DIAGNOSIS — I25.10 ATHEROSCLEROSIS OF NATIVE CORONARY ARTERY OF NATIVE HEART WITHOUT ANGINA PECTORIS: Chronic | ICD-10-CM

## 2022-11-28 DIAGNOSIS — I50.32 CHRONIC DIASTOLIC HEART FAILURE (HCC): ICD-10-CM

## 2022-11-28 DIAGNOSIS — I48.0 PAROXYSMAL ATRIAL FIBRILLATION (HCC): ICD-10-CM

## 2022-11-28 DIAGNOSIS — E66.9 NON MORBID OBESITY, UNSPECIFIED OBESITY TYPE: ICD-10-CM

## 2022-11-28 DIAGNOSIS — G47.33 OSA (OBSTRUCTIVE SLEEP APNEA): Primary | Chronic | ICD-10-CM

## 2022-11-28 DIAGNOSIS — I10 ESSENTIAL HYPERTENSION, BENIGN: Chronic | ICD-10-CM

## 2022-11-28 PROBLEM — I48.20 CHRONIC ATRIAL FIBRILLATION (HCC): Status: ACTIVE | Noted: 2022-11-02

## 2022-11-28 PROCEDURE — 3017F COLORECTAL CA SCREEN DOC REV: CPT | Performed by: NURSE PRACTITIONER

## 2022-11-28 PROCEDURE — 1036F TOBACCO NON-USER: CPT | Performed by: NURSE PRACTITIONER

## 2022-11-28 PROCEDURE — 99214 OFFICE O/P EST MOD 30 MIN: CPT | Performed by: NURSE PRACTITIONER

## 2022-11-28 PROCEDURE — G8417 CALC BMI ABV UP PARAM F/U: HCPCS | Performed by: NURSE PRACTITIONER

## 2022-11-28 PROCEDURE — 3074F SYST BP LT 130 MM HG: CPT | Performed by: NURSE PRACTITIONER

## 2022-11-28 PROCEDURE — G8427 DOCREV CUR MEDS BY ELIG CLIN: HCPCS | Performed by: NURSE PRACTITIONER

## 2022-11-28 PROCEDURE — G8484 FLU IMMUNIZE NO ADMIN: HCPCS | Performed by: NURSE PRACTITIONER

## 2022-11-28 PROCEDURE — 1123F ACP DISCUSS/DSCN MKR DOCD: CPT | Performed by: NURSE PRACTITIONER

## 2022-11-28 PROCEDURE — 3078F DIAST BP <80 MM HG: CPT | Performed by: NURSE PRACTITIONER

## 2022-11-28 ASSESSMENT — SLEEP AND FATIGUE QUESTIONNAIRES
HOW LIKELY ARE YOU TO NOD OFF OR FALL ASLEEP WHILE SITTING AND READING: 3
HOW LIKELY ARE YOU TO NOD OFF OR FALL ASLEEP WHILE SITTING QUIETLY AFTER LUNCH WITHOUT ALCOHOL: 3
HOW LIKELY ARE YOU TO NOD OFF OR FALL ASLEEP WHILE LYING DOWN TO REST IN THE AFTERNOON WHEN CIRCUMSTANCES PERMIT: 3
HOW LIKELY ARE YOU TO NOD OFF OR FALL ASLEEP IN A CAR, WHILE STOPPED FOR A FEW MINUTES IN TRAFFIC: 0
HOW LIKELY ARE YOU TO NOD OFF OR FALL ASLEEP WHILE SITTING AND TALKING TO SOMEONE: 1
HOW LIKELY ARE YOU TO NOD OFF OR FALL ASLEEP WHILE SITTING INACTIVE IN A PUBLIC PLACE: 3
HOW LIKELY ARE YOU TO NOD OFF OR FALL ASLEEP WHILE WATCHING TV: 3
HOW LIKELY ARE YOU TO NOD OFF OR FALL ASLEEP WHEN YOU ARE A PASSENGER IN A CAR FOR AN HOUR WITHOUT A BREAK: 3
ESS TOTAL SCORE: 19

## 2022-11-28 NOTE — ASSESSMENT & PLAN NOTE
Chronic-with progression/exacerbation: Reviewed and analyzed results of physiologic download from patient's machine and reviewed with patient. Supplies and parts as needed for his machine. These are medically necessary. Limit caffeine use after 3pm. Based on the analyzed data will change following settings: EPAP min increased to 17, PS increased to 3. Despite compliant use of his machine and multiple pressure adjustments he continues to have uncontrolled daytime sleepiness, an elevated AHI of 7.6/ hr, and new onset A. Fib he will need a full night in lab bilevel titration to further assess the adequacy of control of his sleep apnea. Discussed no driving when sleepy. I encouraged him to work with his equipment company on mask fit. Also discussed trying a mask liner and provided him resources to view and obtain mask liners. Will call patient with results of the titration study as they become available and schedule follow-up appointment at that time. Will pull machine data report in 1 month to review. Encouraged him to contact the office with any questions or concerns.

## 2022-11-28 NOTE — PROGRESS NOTES
Bhumi Gray MD  [facility] Favre CNP [address]  [address]  [address], [address] [facility]- [phone]   [facility] Vic Tapia. [address] [place] [address]. [address] [phone]     [address] [address] [phone]      Assessment/Plan:      1. ODALYS (obstructive sleep apnea)  Assessment & Plan:  Chronic-with progression/exacerbation: Reviewed and analyzed results of physiologic download from patient's machine and reviewed with patient. Supplies and parts as needed for his machine. These are medically necessary. Limit caffeine use after 3pm. Based on the analyzed data will change following settings: EPAP min increased to 17, PS increased to 3. Despite compliant use of his machine and multiple pressure adjustments he continues to have uncontrolled daytime sleepiness, an elevated AHI of 7.6/ hr, and new onset A. Fib he will need a full night in lab bilevel titration to further assess the adequacy of control of his sleep apnea. Discussed no driving when sleepy. I encouraged him to work with his equipment company on mask fit. Also discussed trying a mask liner and provided him resources to view and obtain mask liners. Will call patient with results of the titration study as they become available and schedule follow-up appointment at that time. Will pull machine data report in 1 month to review. Encouraged him to contact the office with any questions or concerns. Orders:  -     Sleep Study with PAP Titration; Future  2. Atherosclerosis of native coronary artery of native heart without angina pectoris  Assessment & Plan:   Chronic- Stable. Discussed the importance of treating obstructive sleep apnea as part of the management of this disorder. Cont any meds per PCP and other physicians. 3. Chronic diastolic heart failure (HCC)  Assessment & Plan:  Chronic- Stable.   Discussed the importance of treating obstructive sleep apnea as part of the management of this disorder. Cont any meds per PCP and other physicians. 4. Paroxysmal atrial fibrillation (HCC)  Assessment & Plan:   Chronic- Stable. Discussed the importance of treating obstructive sleep apnea as part of the management of this disorder. Cont any meds per PCP and other physicians. 5. Essential hypertension, benign  Assessment & Plan:   Chronic- Stable. Discussed the importance of treating obstructive sleep apnea as part of the management of this disorder. Cont any meds per PCP and other physicians. 6. Non morbid obesity, unspecified obesity type  Assessment & Plan:   Chronic-not stable:  Discussed importance of treating obstructive sleep apnea and getting sufficient sleep to assist with weight control. Encouraged him to work on weight loss through diet and exercise. Recommended DASH or Mediterranean diets. Reviewed, analyzed, and documented physiologic data from patient's PAP machine. This information was analyzed to assess complexity and medical decision making in regards to further testing and management. The primary encounter diagnosis was ODALYS (obstructive sleep apnea). Diagnoses of Atherosclerosis of native coronary artery of native heart without angina pectoris, Chronic diastolic heart failure ([address] Paroxysmal atrial fibrillation ([address] Essential hypertension, benign, and Non morbid obesity, unspecified obesity type were also pertinent to this visit. The chronic medical conditions listed are directly related to the primary diagnosis listed above. The management of the primary diagnosis affects the secondary diagnosis and vice versa. Subjective:   Subjective   Patient ID: Otto Pollard is a 78 y.o. male.     Chief Complaint   Patient presents with    Sleep Apnea       HPI:  Machine Modem/Download Info:  Compliance (hours/night): 9.7 hrs/night  % of nights >= 4 hrs: 99 %  Download AHI (/hour): 7.6 /HR   Average IPAP Pressure: 21.4 cmH2O  Average EPAP Pressure: 19.4 cmH2O               AUTO BILEVEL - Settings (ResMed)  IPAP Max: 25 cmH2O  EPAP Min: 16 cmH2O  Pressure Support: 2         PAP Mask  Mask Type: Full Face mask     Sol Perez presents today for follow-up for sleep apnea. He reports he is doing well with his machine. He feels the mask leak has been better but continues to have a significant amount of daytime sleepiness despite averaging 9-10 hours of sleep. He has been taking co-Q10 some nights as a sometimes will give him some more energy. The pressure on his machine is comfortable. he denies headaches, congestion, nosebleeds, dryness, aerophagia, or drowsiness while driving. He is currently wearing a heart monitor due to a new onset of A. fib. He has follow-up scheduled with cardiology in January.     [facility]    [facility] Sleepiness Score: 19    Social History     Socioeconomic History    Marital status:      Spouse name: Not on file    Number of children: Not on file    Years of education: Not on file    Highest education level: Not on file   Occupational History    Not on file   Tobacco Use    Smoking status: Former     Types: Cigarettes     Quit date: 2000     Years since quittin.7    Smokeless tobacco: Never   Vaping Use    Vaping Use: Never used   Substance and Sexual Activity    Alcohol use: No     Comment: 1 beer/yr    Drug use: No    Sexual activity: Yes   Other Topics Concern    Not on file   Social History Narrative    Not on file     Social Determinants of Health     Financial Resource Strain: Not on file   Food Insecurity: Not on file   Transportation Needs: Not on file   Physical Activity: Not on file   Stress: Not on file   Social Connections: Not on file   Intimate Partner Violence: Not on file   Housing Stability: Not on file       Current Outpatient Medications   Medication Instructions    ACETYLCARNITINE HCL PO Oral    atorvastatin (LIPITOR) 10 MG tablet TAKE 1 TABLET BY MOUTH ONE TIME A DAY     Cholecalciferol (VITAMIN D3) 5000 UNITS TABS 1 tablet, Oral, DAILY    CINNAMON PO 1 capsule, Oral, DAILY    cloNIDine (CATAPRES) 0.1 mg, Oral, DAILY    CoQ10 200 mg, Oral, DAILY    CRANBERRY EXTRACT PO Oral    dilTIAZem (TIAZAC) 240 MG extended release capsule No dose, route, or frequency recorded. fexofenadine (ALLEGRA) 180 mg, Oral, DAILY    fish oil-omega-3 fatty acids 1 g, Oral, DAILY    furosemide (LASIX) 20 mg, Oral, DAILY    Glucosamine-Chondroit-Biofl-Mn (GLUCOSAMINE CHONDROITIN COMPLX) CAPS 1 capsule, DAILY    hydrALAZINE (APRESOLINE) 50 mg, Oral, 2 TIMES DAILY    lisinopril (PRINIVIL;ZESTRIL) 10 mg, Oral, DAILY    magnesium gluconate (MAGONATE) 2,000 mg, Oral, DAILY    Multiple Vitamin (MULTIVITAMIN PO) 1 tablet, DAILY    omeprazole 20 mg, DAILY    oxyCODONE (OXY-IR) 15 mg, Oral, 4 times a day    Probiotic Product (PROBIOTIC ADVANCED PO) Oral    Resveratrol 100 MG CAPS Oral, DAILY    terazosin (HYTRIN) 1 mg, Oral, NIGHTLY    testosterone cypionate (DEPOTESTOTERONE CYPIONATE) 50 mg, IntraMUSCular, Every 3 months    tiZANidine (ZANAFLEX) 4 mg, Oral, EVERY 6 HOURS PRN    Turmeric 400 MG CAPS Oral    UNABLE TO FIND circulation and vein support           Vitals:  Weight BMI   Wt Readings from Last 3 Encounters:   11/28/22 231 lb 3.2 oz (104.9 kg)   11/15/22 226 lb 12.8 oz (102.9 kg)   11/02/22 230 lb (104.3 kg)    Body mass index is 37.89 kg/m².      BP HR SaO2   BP Readings from Last 3 Encounters:   11/28/22 (!) 148/50   11/15/22 (!) 134/58   11/02/22 (!) 140/54    Pulse Readings from Last 3 Encounters:   11/28/22 68   11/15/22 64   11/02/22 65    SpO2 Readings from Last 3 Encounters:   11/28/22 93%   11/15/22 96%   11/02/22 95%        Electronically signed by NEO Guevara on 11/28/2022 at 4:52 PM

## 2022-11-28 NOTE — LETTER
[facility]  [address]  [address] [address]  Phone: [phone]  Fax: [phone]    November 28, 2022       Patient: Dorinda Love   MR Number: [id number]   YOB: 1945   Date of Visit: 11/28/2022       Dorinda Love was seen for a follow up visit today. Here is my assessment and plan as well as an attached copy of his visit today:    Coronary Artery Disease   Chronic- Stable. Discussed the importance of treating obstructive sleep apnea as part of the management of this disorder. Cont any meds per PCP and other physicians. Chronic diastolic heart failure (HCC)  Chronic- Stable. Discussed the importance of treating obstructive sleep apnea as part of the management of this disorder. Cont any meds per PCP and other physicians. Paroxysmal atrial fibrillation (HCC)   Chronic- Stable. Discussed the importance of treating obstructive sleep apnea as part of the management of this disorder. Cont any meds per PCP and other physicians. Essential hypertension, benign   Chronic- Stable. Discussed the importance of treating obstructive sleep apnea as part of the management of this disorder. Cont any meds per PCP and other physicians. Non morbid obesity, unspecified obesity type   Chronic-not stable:  Discussed importance of treating obstructive sleep apnea and getting sufficient sleep to assist with weight control. Encouraged him to work on weight loss through diet and exercise. Recommended DASH or Mediterranean diets. ODALYS (obstructive sleep apnea)  Chronic-with progression/exacerbation: Reviewed and analyzed results of physiologic download from patient's machine and reviewed with patient. Supplies and parts as needed for his machine. These are medically necessary. Limit caffeine use after 3pm. Based on the analyzed data will change following settings: EPAP min increased to 17, PS increased to 3.   Despite compliant use of his machine and multiple pressure adjustments he continues to have uncontrolled daytime sleepiness, an elevated AHI of 7.6/ hr, and new onset A. Fib he will need a full night in lab bilevel titration to further assess the adequacy of control of his sleep apnea. Discussed no driving when sleepy. I encouraged him to work with his equipment company on mask fit. Also discussed trying a mask liner and provided him resources to view and obtain mask liners. Will call patient with results of the titration study as they become available and schedule follow-up appointment at that time. Will pull machine data report in 1 month to review. Encouraged him to contact the office with any questions or concerns. If you have questions or concerns, please do not hesitate to call me. I look forward to following Black Zavala along with you.     Sincerely,    NEO Denny    CC providers:  Yeyo Haines MD  [address], [facility] [address] [place] [address]

## 2022-12-05 ENCOUNTER — TELEPHONE (OUTPATIENT)
Dept: PULMONOLOGY | Age: 77
End: 2022-12-05

## 2022-12-05 NOTE — TELEPHONE ENCOUNTER
Pt L/M for Darrian Suazo, letting her know that his mask had a tear in it so he switched it to a new one and that he needs her to turn the settings back to the way they were.     Ph. [phone]

## 2023-01-05 ENCOUNTER — TELEPHONE (OUTPATIENT)
Dept: PULMONOLOGY | Age: 78
End: 2023-01-05

## 2023-01-05 NOTE — TELEPHONE ENCOUNTER
Called patient to review machine data and discuss. He canceled his sleep study due to illness. He reports he replaced his full face mask, has been using a chinstrap and has been getting a better seal.  Machine data shows that his numbers have improved. Compliance is good and AHI is 3.5, which has decreased. He feels his daytime symptoms are improving. Discussed at this time since he is feeling better and numbers have improved we will hold off on sleep study and a follow-up visit in 1 month to review. Encouraged him to contact the office with any questions or concerns.

## 2023-01-06 ENCOUNTER — TELEPHONE (OUTPATIENT)
Dept: PULMONOLOGY | Age: 78
End: 2023-01-06

## 2023-01-06 NOTE — TELEPHONE ENCOUNTER
Patient states that the ramp setting on his machine is fine to stay where its at, but he would like the rest of his settings switched back to where they were at.

## 2023-01-27 ENCOUNTER — OFFICE VISIT (OUTPATIENT)
Dept: CARDIOLOGY CLINIC | Age: 78
End: 2023-01-27

## 2023-01-27 VITALS
HEIGHT: 66 IN | HEART RATE: 71 BPM | BODY MASS INDEX: 36.8 KG/M2 | WEIGHT: 229 LBS | DIASTOLIC BLOOD PRESSURE: 64 MMHG | OXYGEN SATURATION: 97 % | SYSTOLIC BLOOD PRESSURE: 134 MMHG

## 2023-01-27 DIAGNOSIS — R07.9 CHEST PAIN, UNSPECIFIED TYPE: ICD-10-CM

## 2023-01-27 DIAGNOSIS — I25.10 ATHEROSCLEROSIS OF NATIVE CORONARY ARTERY OF NATIVE HEART WITHOUT ANGINA PECTORIS: Primary | Chronic | ICD-10-CM

## 2023-01-27 DIAGNOSIS — E78.2 MIXED HYPERLIPIDEMIA: Chronic | ICD-10-CM

## 2023-01-27 DIAGNOSIS — I50.32 CHRONIC DIASTOLIC HEART FAILURE (HCC): ICD-10-CM

## 2023-01-27 DIAGNOSIS — G47.33 OSA (OBSTRUCTIVE SLEEP APNEA): Chronic | ICD-10-CM

## 2023-01-27 DIAGNOSIS — R53.83 FATIGUE, UNSPECIFIED TYPE: ICD-10-CM

## 2023-01-27 DIAGNOSIS — I10 ESSENTIAL HYPERTENSION, BENIGN: Chronic | ICD-10-CM

## 2023-01-27 DIAGNOSIS — R06.09 DYSPNEA ON EXERTION: ICD-10-CM

## 2023-01-27 DIAGNOSIS — E87.5 HYPERKALEMIA: ICD-10-CM

## 2023-01-27 RX ORDER — FUROSEMIDE 40 MG/1
40 TABLET ORAL DAILY
Qty: 90 TABLET | Refills: 4 | Status: SHIPPED | OUTPATIENT
Start: 2023-01-27

## 2023-01-27 RX ORDER — LISINOPRIL 10 MG/1
10 TABLET ORAL DAILY
Qty: 90 TABLET | Refills: 4 | Status: SHIPPED | OUTPATIENT
Start: 2023-01-27

## 2023-01-27 RX ORDER — ATORVASTATIN CALCIUM 10 MG/1
TABLET, FILM COATED ORAL
Qty: 90 TABLET | Refills: 4 | Status: SHIPPED | OUTPATIENT
Start: 2023-01-27

## 2023-01-27 RX ORDER — HYDRALAZINE HYDROCHLORIDE 50 MG/1
50 TABLET, FILM COATED ORAL 2 TIMES DAILY
Qty: 180 TABLET | Refills: 4 | Status: SHIPPED | OUTPATIENT
Start: 2023-01-27

## 2023-01-27 NOTE — PROGRESS NOTES
[facility]  Cardiac Follow Up     Referring Provider:  Wilfredo Nava MD     Chief Complaint   Patient presents with    Follow-up    Coronary Artery Disease    Hypertension    Hyperlipidemia        History of Present Illness:    Mr. Chloe Larson is seen today for  follow up for management of CAD, hypertension, hyperlipidemia. He also has ODALYS uses Bipap. Kerrie Felton He is now using his CPAP. Still complains of falling asleep. Taking Oxy 20 mg QID. He had afib in PCP office. Same EP and in sinus. MCOT with no afib but 14 beat NSVT. Some episodes of chest pain. Past Medical History: has a past medical history of Acute MI ([address] Back pain, CAD (coronary artery disease), GERD (gastroesophageal reflux disease), Hyperlipidemia, Hypertension, and Sleep apnea. Surgical History: has a past surgical history that includes Cardiac surgery; back surgery; Hand surgery; Gallbladder surgery; Coronary artery bypass graft; cyst removal (N/A, 1970); and Prostate biopsy (2020). Social History: reports that he quit smoking about 22 years ago. His smoking use included cigarettes. He has never used smokeless tobacco. He reports that he does not drink alcohol and does not use drugs. Family History:family history includes Cancer in his sister; Heart Attack (age of onset: 72) in his father; Heart Disease in his father and mother; Kidney Disease in his mother. Home Medications:  Prior to Visit Medications    Medication Sig Taking?  Authorizing Provider   dilTIAZem (TIAZAC) 240 MG extended release capsule  Yes Historical Provider, MD   terazosin (HYTRIN) 1 MG capsule Take 1 mg by mouth nightly Yes Historical Provider, MD   furosemide (LASIX) 40 MG tablet Take 0.5 tablets by mouth daily  Patient taking differently: Take 40 mg by mouth daily Yes NEO Urena - CNP   lisinopril (PRINIVIL;ZESTRIL) 10 MG tablet Take 1 tablet by mouth daily Yes Lottie Perry MD   hydrALAZINE (APRESOLINE) 50 MG tablet Take 1 tablet by mouth 2 times daily Yes Army Shady MD   atorvastatin (LIPITOR) 10 MG tablet TAKE 1 TABLET BY MOUTH ONE TIME A DAY Yes Army Shady MD   Turmeric 400 MG CAPS Take by mouth Yes Historical Provider, MD   CRANBERRY EXTRACT PO Take by mouth Yes Historical Provider, MD   Resveratrol 100 MG CAPS Take by mouth daily Yes Historical Provider, MD   Probiotic Product (PROBIOTIC ADVANCED PO) Take by mouth Yes Historical Provider, MD   ACETYLCARNITINE HCL PO Take by mouth Yes Historical Provider, MD   UNABLE TO FIND circulation and vein support Yes Historical Provider, MD   tiZANidine (ZANAFLEX) 4 MG tablet Take 4 mg by mouth every 6 hours as needed  Yes Historical Provider, MD   fexofenadine (ALLEGRA) 180 MG tablet Take 180 mg by mouth daily Yes Historical Provider, MD   Cholecalciferol (VITAMIN D3) 5000 UNITS TABS Take 1 tablet by mouth daily Yes Historical Provider, MD   cloNIDine (CATAPRES) 0.1 MG tablet Take 1 tablet by mouth daily  Patient taking differently: Take 0.1 mg by mouth 2 times daily Yes Army Shady MD   magnesium gluconate (MAGONATE) 500 MG tablet Take 2,000 mg by mouth daily  Yes Historical Provider, MD   oxyCODONE (OXY-IR) 30 MG immediate release tablet Take 15 mg by mouth. 4 times a day Yes Historical Provider, MD   testosterone cypionate (DEPOTESTOTERONE CYPIONATE) 100 MG/ML injection Inject 50 mg into the muscle. Every 3 months. Yes Historical Provider, MD   fish oil-omega-3 fatty acids 1000 MG capsule Take 1 g by mouth daily  Yes Historical Provider, MD   Glucosamine-Chondroit-Biofl-Mn (GLUCOSAMINE CHONDROITIN COMPLX) CAPS Take 1 capsule by mouth daily. Yes Historical Provider, MD   Omeprazole 20 MG TBEC Take 20 mg by mouth daily. Yes Historical Provider, MD   Coenzyme Q10 (COQ10) 100 MG CAPS Take 200 mg by mouth daily Indications: 200-500 MG QD  Yes Historical Provider, MD   Multiple Vitamin (MULTIVITAMIN PO) Take 1 tablet by mouth daily.    Yes Historical Provider, MD   CINNAMON PO Take 1 capsule by mouth daily  Yes Historical Provider, MD         Allergies:Carvedilol, Codeine, and Hydralazine     [x] Medications and dosages reviewed. ROS:  [x]Full ROS obtained and negative except as mentioned in HPI      Physical Examination:     /64 (Site: Left Upper Arm, Position: Sitting, Cuff Size: Large Adult)   Pulse 71   Ht 5' 5.5\" (1.664 m)   Wt 229 lb (103.9 kg)   SpO2 97%   BMI 37.53 kg/m²       GENERAL: Well developed, well nourished, No acute distress  NEUROLOGICAL: Alert and oriented  PSYCH: Calm affect  SKIN: Warm and dry, no visible rash  HEENT: Normocephalic, Sclera non-icteric, mucus membranes moist  NECK: supple, JVP normal  CAROTID: Normal upstroke, no bruits  CARDIAC: Normal PMI, regular rate and rhythm, normal S1S2, No murmur, rub, or gallop  RESPIRATORY: Normal respiratory effort, clear to auscultation bilaterally  EXTREMITIES: No LE edema  MUSCULOSKELETAL: No joint swelling or tenderness, no chest wall tenderness  GASTROINTESTINAL: normal bowel sounds, soft, non-tender, no bruit         CARDIAC CATH 7/2020  LM-30%  LAD-prox 30%, mid 50%, competitive filling filling after mid vessel. LCX-occluded after OM1, Small caliber OM1 with 70% mid vessel. RCA-Occluded mid vessel  LV-mid anterior-lateral HK, EF=50%  LVEDP=10        LIMA=>LAD patent  SVG=>RCA patent  Radial=>OM Patent     IMP:  3 vessel CAD with patent grafts and mild LV dysfunction  Lateral ischemia seen on stress possibley due to OM1  Vessel is small and he has limited symptoms so will treat medically    ECHO 1/26/2022  Summary   -Normal left ventricle size, wall thickness, and systolic function with an   estimated ejection fraction of 55-60%. -No regional wall motion abnormalities are seen.   -Aortic valve appears sclerotic but opens adequately. There is   mild-to-moderate aortic insufficiency.   -Thickened mitral valve without evidence of stenosis.  There is moderate   mitral regurgitation.   -There is mild pulmonic regurgitation.   -Grade III diastolic dysfunction with elevated LV filling pressures. Avg.   E/e'=14.7        EKG:   Sinus    ASSESSMENT:  1. Hyperlipidemia - LDL 62 Continue lipitor 10, well controlled. Repeat lipids   2. Hypertension, -  Well controlled. Follow on current therapy    /64 (Site: Left Upper Arm, Position: Sitting, Cuff Size: Large Adult)   Pulse 71   Ht 5' 5.5\" (1.664 m)   Wt 229 lb (103.9 kg)   SpO2 97%   BMI 37.53 kg/m²      3. Coronary Artery Disease -- Stable. 2001 CABG. Stable. Cardiac cath 7/2020 as above. Continue medical management. With NSVT and some chest pain with repeat myoview   4. ODALYS- Able to use CPAP now. Improved. F/u sleep medicine  5. Hyperkalemia: worsened with aldactone. Aldactone stopped and HCTZ restarted. On both lasix and HCTZ in past due to high K+. Improved. K=4.8. Recheck  6. Dyspnea: Improved. Needs weight loss. Follow  7. CHF: diastolic dysfunction. Needs BP and sleep apnea controlled. Regular exercise. Weight loss  8. Mitral Regurgitation: Moderate with normal LV size and function. No indication to consider MVR. Follow with yearly ECHO.  Repeat 2023    Plan:  Same meds  Labs  Stress myoview  F/u to review    Jose Manuel Yuan M.D., [facility] [place]

## 2023-02-07 ENCOUNTER — HOSPITAL ENCOUNTER (OUTPATIENT)
Age: 78
Discharge: HOME | End: 2023-02-07
Payer: MEDICARE

## 2023-02-07 DIAGNOSIS — I25.10 ATHEROSCLEROSIS OF NATIVE CORONARY ARTERY OF NATIVE HEART WITHOUT ANGINA PECTORIS: Chronic | ICD-10-CM

## 2023-02-07 DIAGNOSIS — R53.83 FATIGUE, UNSPECIFIED TYPE: ICD-10-CM

## 2023-02-07 LAB
HCT VFR BLD CALC: 46.6 % (ref 40.5–52.5)
HEMOGLOBIN: 15.1 G/DL (ref 13.5–17.5)
MCH RBC QN AUTO: 30.5 PG (ref 26–34)
MCHC RBC AUTO-ENTMCNC: 32.4 G/DL (ref 31–36)
MCV RBC AUTO: 94.2 FL (ref 80–100)
PDW BLD-RTO: 13.9 % (ref 12.4–15.4)
PLATELET # BLD: 179 K/UL (ref 135–450)
PMV BLD AUTO: 8.9 FL (ref 5–10.5)
RBC # BLD: 4.95 M/UL (ref 4.2–5.9)
WBC # BLD: 7.8 K/UL (ref 4–11)

## 2023-02-07 PROCEDURE — 84460 ALANINE AMINO (ALT) (SGPT): CPT

## 2023-02-07 PROCEDURE — 80048 BASIC METABOLIC PNL TOTAL CA: CPT

## 2023-02-07 PROCEDURE — 84443 ASSAY THYROID STIM HORMONE: CPT

## 2023-02-07 PROCEDURE — 80061 LIPID PANEL: CPT

## 2023-02-07 PROCEDURE — 85027 COMPLETE CBC AUTOMATED: CPT

## 2023-02-07 PROCEDURE — 36415 COLL VENOUS BLD VENIPUNCTURE: CPT

## 2023-02-07 PROCEDURE — 84450 TRANSFERASE (AST) (SGOT): CPT

## 2023-02-08 ENCOUNTER — TELEPHONE (OUTPATIENT)
Dept: CARDIOLOGY CLINIC | Age: 78
End: 2023-02-08

## 2023-02-08 LAB
ALT SERPL-CCNC: 21 U/L (ref 10–40)
ANION GAP SERPL CALCULATED.3IONS-SCNC: 12 MMOL/L (ref 3–16)
AST SERPL-CCNC: 23 U/L (ref 15–37)
BUN BLDV-MCNC: 25 MG/DL (ref 7–20)
CALCIUM SERPL-MCNC: 9.6 MG/DL (ref 8.3–10.6)
CHLORIDE BLD-SCNC: 98 MMOL/L (ref 99–110)
CHOLESTEROL, FASTING: 192 MG/DL (ref 0–199)
CO2: 28 MMOL/L (ref 21–32)
CREAT SERPL-MCNC: 1.3 MG/DL (ref 0.8–1.3)
GFR SERPL CREATININE-BSD FRML MDRD: 57 ML/MIN/{1.73_M2}
GLUCOSE BLD-MCNC: 115 MG/DL (ref 70–99)
HDLC SERPL-MCNC: 53 MG/DL (ref 40–60)
LDL CHOLESTEROL CALCULATED: 123 MG/DL
POTASSIUM SERPL-SCNC: 4.4 MMOL/L (ref 3.5–5.1)
SODIUM BLD-SCNC: 138 MMOL/L (ref 136–145)
TRIGLYCERIDE, FASTING: 80 MG/DL (ref 0–150)
TSH REFLEX: 2.19 UIU/ML (ref 0.27–4.2)
VLDLC SERPL CALC-MCNC: 16 MG/DL

## 2023-02-08 NOTE — TELEPHONE ENCOUNTER
----- Message from Justina Mcknight MD sent at 2/8/2023 12:33 PM EST -----  Lipids have dramatically increased. He needs to be sure to take the lipitor daily. Repeat 3 months.     1 [facility]

## 2023-02-10 ENCOUNTER — HOSPITAL ENCOUNTER (OUTPATIENT)
Dept: NON INVASIVE DIAGNOSTICS | Age: 78
Discharge: HOME | End: 2023-02-10
Payer: MEDICARE

## 2023-02-10 ENCOUNTER — OFFICE VISIT (OUTPATIENT)
Dept: CARDIOLOGY CLINIC | Age: 78
End: 2023-02-10

## 2023-02-10 VITALS
HEIGHT: 66 IN | WEIGHT: 229 LBS | HEART RATE: 68 BPM | SYSTOLIC BLOOD PRESSURE: 136 MMHG | BODY MASS INDEX: 36.8 KG/M2 | OXYGEN SATURATION: 94 % | DIASTOLIC BLOOD PRESSURE: 70 MMHG

## 2023-02-10 DIAGNOSIS — I10 ESSENTIAL HYPERTENSION, BENIGN: Chronic | ICD-10-CM

## 2023-02-10 DIAGNOSIS — I25.10 ATHEROSCLEROSIS OF NATIVE CORONARY ARTERY OF NATIVE HEART WITHOUT ANGINA PECTORIS: Primary | Chronic | ICD-10-CM

## 2023-02-10 DIAGNOSIS — E87.5 HYPERKALEMIA: ICD-10-CM

## 2023-02-10 DIAGNOSIS — I50.32 CHRONIC DIASTOLIC HEART FAILURE (HCC): ICD-10-CM

## 2023-02-10 DIAGNOSIS — R06.09 DYSPNEA ON EXERTION: ICD-10-CM

## 2023-02-10 DIAGNOSIS — R07.9 CHEST PAIN, UNSPECIFIED TYPE: ICD-10-CM

## 2023-02-10 DIAGNOSIS — I25.10 ATHEROSCLEROSIS OF NATIVE CORONARY ARTERY OF NATIVE HEART WITHOUT ANGINA PECTORIS: Chronic | ICD-10-CM

## 2023-02-10 DIAGNOSIS — E78.2 MIXED HYPERLIPIDEMIA: Chronic | ICD-10-CM

## 2023-02-10 DIAGNOSIS — I34.0 NONRHEUMATIC MITRAL VALVE REGURGITATION: ICD-10-CM

## 2023-02-10 DIAGNOSIS — G47.33 OSA (OBSTRUCTIVE SLEEP APNEA): Chronic | ICD-10-CM

## 2023-02-10 LAB
LV EF: 55 %
LVEF MODALITY: NORMAL

## 2023-02-10 PROCEDURE — A9502 TC99M TETROFOSMIN: HCPCS | Performed by: INTERNAL MEDICINE

## 2023-02-10 PROCEDURE — 93017 CV STRESS TEST TRACING ONLY: CPT | Performed by: INTERNAL MEDICINE

## 2023-02-10 PROCEDURE — 3430000000 HC RX DIAGNOSTIC RADIOPHARMACEUTICAL: Performed by: INTERNAL MEDICINE

## 2023-02-10 PROCEDURE — 6360000002 HC RX W HCPCS: Performed by: INTERNAL MEDICINE

## 2023-02-10 PROCEDURE — 78452 HT MUSCLE IMAGE SPECT MULT: CPT

## 2023-02-10 RX ADMIN — TETROFOSMIN 10 MILLICURIE: 1.38 INJECTION, POWDER, LYOPHILIZED, FOR SOLUTION INTRAVENOUS at 12:56

## 2023-02-10 RX ADMIN — TETROFOSMIN 30 MILLICURIE: 1.38 INJECTION, POWDER, LYOPHILIZED, FOR SOLUTION INTRAVENOUS at 14:46

## 2023-02-10 RX ADMIN — REGADENOSON 0.4 MG: 0.08 INJECTION, SOLUTION INTRAVENOUS at 14:30

## 2023-02-10 NOTE — PROGRESS NOTES
Instructed on Lexiscan Stress Test Procedure including possible side effects/ adverse reactions. Patient verbalizes  understanding and denies having any questions. See [address] [place] [address] Cardiology.   Shira Mcfadden RN

## 2023-02-10 NOTE — PROGRESS NOTES
[address]  Cardiac Follow Up     Referring Provider:  Dong Dowling MD     Chief Complaint   Patient presents with    Follow-up    Coronary Artery Disease     F/u lexiscan     Hypertension    Hyperlipidemia        History of Present Illness:    Mr. Jairo Magallanes is seen today for  follow up for management of CAD, hypertension, hyperlipidemia. He also has ODALYS uses Bipap. .     He broke his CPAP mask and is trying to find a replacement piece. No further chest pain. Myoview today. Past Medical History: has a past medical history of Acute MI ([address] Back pain, CAD (coronary artery disease), GERD (gastroesophageal reflux disease), Hyperlipidemia, Hypertension, and Sleep apnea. Surgical History: has a past surgical history that includes Cardiac surgery; back surgery; Hand surgery; Gallbladder surgery; Coronary artery bypass graft; cyst removal (N/A, 1970); and Prostate biopsy (2020). Social History: reports that he quit smoking about 22 years ago. His smoking use included cigarettes. He has never used smokeless tobacco. He reports that he does not drink alcohol and does not use drugs. Family History:family history includes Cancer in his sister; Heart Attack (age of onset: 72) in his father; Heart Disease in his father and mother; Kidney Disease in his mother. Home Medications:  Prior to Visit Medications    Medication Sig Taking?  Authorizing Provider   apixaban (ELIQUIS) 5 MG TABS tablet Take by mouth 2 times daily Yes Historical Provider, MD   atorvastatin (LIPITOR) 10 MG tablet TAKE 1 TABLET BY MOUTH ONE TIME A DAY Yes Ana María Cramer MD   hydrALAZINE (APRESOLINE) 50 MG tablet Take 1 tablet by mouth 2 times daily Yes Ana María Cramer MD   lisinopril (PRINIVIL;ZESTRIL) 10 MG tablet Take 1 tablet by mouth daily Yes Ana María Cramer MD   furosemide (LASIX) 40 MG tablet Take 1 tablet by mouth daily Yes Ana María Cramer MD   dilTIAZem [facility]) 240 MG extended release capsule  Yes Historical Provider, MD terazosin (HYTRIN) 1 MG capsule Take 1 mg by mouth nightly Yes Historical Provider, MD   Turmeric 400 MG CAPS Take by mouth Yes Historical Provider, MD   CRANBERRY EXTRACT PO Take by mouth Yes Historical Provider, MD   Resveratrol 100 MG CAPS Take by mouth daily Yes Historical Provider, MD   Probiotic Product (PROBIOTIC ADVANCED PO) Take by mouth Yes Historical Provider, MD   ACETYLCARNITINE HCL PO Take by mouth Yes Historical Provider, MD   UNABLE TO FIND circulation and vein support Yes Historical Provider, MD   tiZANidine (ZANAFLEX) 4 MG tablet Take 4 mg by mouth every 6 hours as needed  Yes Historical Provider, MD   fexofenadine (ALLEGRA) 180 MG tablet Take 180 mg by mouth daily Yes Historical Provider, MD   Cholecalciferol (VITAMIN D3) 5000 UNITS TABS Take 1 tablet by mouth daily Yes Historical Provider, MD   cloNIDine (CATAPRES) 0.1 MG tablet Take 1 tablet by mouth daily  Patient taking differently: Take 0.1 mg by mouth 2 times daily Yes Kasey Love MD   magnesium gluconate (MAGONATE) 500 MG tablet Take 2,000 mg by mouth daily  Yes Historical Provider, MD   oxyCODONE (ROXICODONE) 20 MG immediate release tablet Take 20 mg by mouth in the morning, at noon, in the evening, and at bedtime. 4 times a day Yes Historical Provider, MD   testosterone cypionate (DEPOTESTOTERONE CYPIONATE) 100 MG/ML injection Inject 50 mg into the muscle. Every 3 months. Yes Historical Provider, MD   fish oil-omega-3 fatty acids 1000 MG capsule Take 1 g by mouth daily  Yes Historical Provider, MD   Glucosamine-Chondroit-Biofl-Mn (GLUCOSAMINE CHONDROITIN COMPLX) CAPS Take 1 capsule by mouth daily. Yes Historical Provider, MD   Omeprazole 20 MG TBEC Take 20 mg by mouth daily. Yes Historical Provider, MD   Coenzyme Q10 (COQ10) 100 MG CAPS Take 200 mg by mouth daily Indications: 200-500 MG QD  Yes Historical Provider, MD   Multiple Vitamin (MULTIVITAMIN PO) Take 1 tablet by mouth daily.    Yes Historical Provider, MD   CINNAMON PO Take 1 capsule by mouth daily  Yes Historical Provider, MD         Allergies:Carvedilol, Codeine, and Hydralazine     [x] Medications and dosages reviewed. ROS:  [x]Full ROS obtained and negative except as mentioned in HPI      Physical Examination:     /70 (Site: Right Upper Arm, Position: Sitting, Cuff Size: Large Adult)   Pulse 68   Ht 5' 5.5\" (1.664 m)   Wt 229 lb (103.9 kg)   SpO2 94%   BMI 37.53 kg/m²       GENERAL: Well developed, well nourished, No acute distress  NEUROLOGICAL: Alert and oriented  PSYCH: Calm affect  SKIN: Warm and dry, no visible rash  HEENT: Normocephalic, Sclera non-icteric, mucus membranes moist  NECK: supple, JVP normal  CAROTID: Normal upstroke, no bruits  CARDIAC: Normal PMI, regular rate and rhythm, normal S1S2, No murmur, rub, or gallop  RESPIRATORY: Normal respiratory effort, clear to auscultation bilaterally  EXTREMITIES: No LE edema  MUSCULOSKELETAL: No joint swelling or tenderness, no chest wall tenderness  GASTROINTESTINAL: normal bowel sounds, soft, non-tender, no bruit         CARDIAC CATH 7/2020  LM-30%  LAD-prox 30%, mid 50%, competitive filling filling after mid vessel. LCX-occluded after OM1, Small caliber OM1 with 70% mid vessel. RCA-Occluded mid vessel  LV-mid anterior-lateral HK, EF=50%  LVEDP=10        LIMA=>LAD patent  SVG=>RCA patent  Radial=>OM Patent     IMP:  3 vessel CAD with patent grafts and mild LV dysfunction  Lateral ischemia seen on stress possibley due to OM1  Vessel is small and he has limited symptoms so will treat medically    ECHO 1/26/2022  Summary   -Normal left ventricle size, wall thickness, and systolic function with an   estimated ejection fraction of 55-60%. -No regional wall motion abnormalities are seen.   -Aortic valve appears sclerotic but opens adequately. There is   mild-to-moderate aortic insufficiency.   -Thickened mitral valve without evidence of stenosis.  There is moderate   mitral regurgitation.   -There is mild pulmonic regurgitation.   -Grade III diastolic dysfunction with elevated LV filling pressures. Avg.   E/e'=14.7        MYOVIEW 2/2023  Summary    Myocardial perfusion is abnormal.    There is a moderate sized, severe intensity, fixed lateral defect c/w scar. Overall LV function is normal with EF=55% with mild lateral hypokinesis. Intermediate risk. ASSESSMENT:  1. Hyperlipidemia - LDL 62 In the past. 123 this time. Had stopped statin as problem with pharmacy. Restart lipitor 10,    2. Hypertension, -  Well controlled. Follow on current therapy    /70 (Site: Right Upper Arm, Position: Sitting, Cuff Size: Large Adult)   Pulse 68   Ht 5' 5.5\" (1.664 m)   Wt 229 lb (103.9 kg)   SpO2 94%   BMI 37.53 kg/m²      3. Coronary Artery Disease -- Stable. 2001 CABG. Stable. Cardiac cath 7/2020 as above. [facility] [place] stable with lateral scar and preserved LV function. Medical therapy. 4.  ODALYS- Able to use CPAP now. Improved. F/u sleep medicine. Needs new piece to mask. He will call supplier  5. Hyperkalemia: worsened with aldactone. Aldactone stopped and HCTZ restarted. On both lasix and HCTZ in past due to high K+. Improved. K=4.4. folow  6. Dyspnea: Improved. Needs weight loss. Follow  7. CHF: diastolic dysfunction. Needs BP and sleep apnea controlled. Regular exercise. Weight loss  8. Mitral Regurgitation: Moderate with normal LV size and function. No indication to consider MVR. Follow with yearly ECHO. Repeat on f/u  9. Afib: Seen in PCP office. MCOT negative. High CHADS2-Vasc. Continue Eliquis.  F/u EP as scheduled      Plan:  Stable  Same meds  F/u 3 months with ECHO  F/u EP for Ruben Walton M.D., [facility] [place]

## 2023-02-22 NOTE — PROGRESS NOTES
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) [] CSA (G47.31) [] Apnea (G47.30)   Length of Need: [x] 15 Months [] 99 Months [] Other:   Machine (LAYO!): [] Respironics Dream Station      Auto [] ResMed AirSense     Auto [] Other:     []  CPAP () [] Bilevel ()   Mode: [] Auto [] Spontaneous    Mode: [] Auto [] Spontaneous             Comfort Settings:      Humidifier: [] Heated ()        [x] Water chamber replacement ()/ 1 per 6 months        Mask:   [] Nasal () /1 per 3 months [x] Full Face () /1 per 3 months   [] Patient choice -Size and fit mask [x] Patient Choice - Size and fit mask   [] Dispense: [] Dispense:   [] Headgear () / 1 per 3 months [x] Headgear () / 1 per 3 months   [] Replacement Nasal Cushion ()/2 per month [x] Interface Replacement ()/1 per month   [] Replacement Nasal Pillows ()/2 per month         Tubing: [x] Heated ()/1 per 3 months    [] Standard ()/1 per 3 months [] Other:           Filters: [x] Non-disposable ()/1 per 6 months     [x] Ultra-Fine, Disposable ()/2 per month        Miscellaneous: [] Chin Strap ()/ 1 per 6 months [] O2 bleed-in:        LPM   [] Oxymetry on CPAP/Bilevel [x]  Other: Mask Re-fit        Start Order Date: 02/22/23    MEDICAL JUSTIFICATION:  I, the undersigned, certify that the above prescribed supplies are medically necessary for this patients wellbeing. In my opinion, the supplies are both reasonable and necessary in reference to accepted standards of medicalpractice in treatment of this patients condition. Hazel Hopkins NP    NPI: [id number]       Order Signed Date: 02/22/23  [address] [place] [address]  Pulmonary, Sleep, and Critical Care    Pulmonary, Sleep, and Critical Care  [address] [place] [address] [place] , [address]                                    Jaime Kamara [facility]  Phone: [phone]    Fax: [phone] [facility]  1945  [address]  [phone] (home)   [phone] (mobile)      Insurance Info (confirm with patient if correct):  Payer/Plan Subscr  Sex Relation Sub.  Ins. ID Effective Group Num

## 2023-03-02 ENCOUNTER — OFFICE VISIT (OUTPATIENT)
Dept: CARDIOLOGY CLINIC | Age: 78
End: 2023-03-02

## 2023-03-02 VITALS
BODY MASS INDEX: 36.64 KG/M2 | DIASTOLIC BLOOD PRESSURE: 58 MMHG | HEIGHT: 66 IN | OXYGEN SATURATION: 94 % | SYSTOLIC BLOOD PRESSURE: 120 MMHG | HEART RATE: 64 BPM | WEIGHT: 228 LBS

## 2023-03-02 DIAGNOSIS — I48.0 PAROXYSMAL ATRIAL FIBRILLATION (HCC): Primary | ICD-10-CM

## 2023-03-02 DIAGNOSIS — I10 ESSENTIAL HYPERTENSION, BENIGN: Chronic | ICD-10-CM

## 2023-03-02 DIAGNOSIS — I50.32 CHRONIC HEART FAILURE WITH PRESERVED EJECTION FRACTION (HCC): ICD-10-CM

## 2023-03-02 DIAGNOSIS — I47.29 NSVT (NONSUSTAINED VENTRICULAR TACHYCARDIA): ICD-10-CM

## 2023-03-02 DIAGNOSIS — I25.10 ATHEROSCLEROSIS OF NATIVE CORONARY ARTERY OF NATIVE HEART WITHOUT ANGINA PECTORIS: Chronic | ICD-10-CM

## 2023-03-02 NOTE — PROGRESS NOTES
[address]   Electrophysiology Followup   Date: 3/2/2023    Chief Complaint   Patient presents with    Follow-up     Pt reports no cardiac symptoms at this time. Atrial Fibrillation    Results     Stress test         CC: Afib   HPI: Cass Falcon is a 78 y.o. male with a PMH of HTN, CAD, obesity, ODALYS and Afib    10/18/2022 patient had an ECG that showed new onset atrial fibrillation. Michael Cooper started patient on Eliquis and diltiazem. Monitor 11/02/2022 to 12/01/2022 - no atrial fibrillation , high heart rate 137, avg heart rate 58, low heart rate 48, 2 episodes of NSVT longest fastest is 14 beats at 137 bpm/ 6 5 PVC burden, 4% PAC burden      Interval History: Blake Hernadez presents today in follow up. He states he would feel better if his sleep apnea was under control. He states he is SOB every evening and he attributes to his sleep apnea. Assessment and plan:   New Onset Afib   -ECG today  Sinus with PAC    -no afib on monitor 11/2022    -Continue Diltiazem 240 mg daily    -Patient has a [id number] Score of at least 4 (age5, HTN, CAD),   -continue  Eliquis 5 mg BID  - Creatinine 1.3  02/07/2023      -Afib risk factors including age, HTN, obesity, inactivity and ODALYS were discussed with patient. Risk factor modification recommended. All questions were answered.   -He had no A-fib on his monitor. The burden seems to be low. We can consider further management if he has recurrence in the future.     NSVT    - two episodes noted on monitor     CAD   -Managed by [facility]   -[address] [facility] 2020 showed patent grafts    -CABG 9272    Diastolic CHF   -Managed by the HF team   -EF 55-60% per echo 1/26/2022   -Continue medical management with lasix, and lisinopril    HTN  - controlled:   -BP goal <130/80  -Home BP monitoring encouraged, printed information provided on how to accurately measure BP at home.  -Counseled to follow a low salt diet to assure blood pressure remains controlled for cardiovascular risk factor modification.   -The patient is counseled to get regular exercise 3-5 times per week and maintain a healthy weight reduce cardiovascular risk factors.      Obesity  Body mass index is 37.36 kg/m².  -Excessive weight is complicating assessment and treatment. It is placing patient at risk for multiple co-morbidities as well as early death and contributing to the patient's presentation.  -Discussed weight management with diet and exercise      ODALYS  -Stable: Uses CPAP - he is working with sleep medicine to improve AHI   -Encourage to use machine to prevent long term effects of untreated ODALYS      Plan:   No changes today   Follow up in one year   F/u with Dr. Sandhu as scheduled     Patient Active Problem List    Diagnosis Date Noted    Essential hypertension, benign 09/06/2011    Paroxysmal atrial fibrillation (HCC) 11/02/2022    Bradycardia 07/11/2022    Mitral regurgitation 05/17/2022    Chronic diastolic heart failure (HCC) 05/17/2022    HLD (hyperlipidemia) 09/06/2011    Coronary Artery Disease 09/06/2011    Non morbid obesity, unspecified obesity type 08/10/2021    Abnormal cardiovascular stress test     Hyperkalemia 06/30/2020    Sebaceous cyst 08/06/2015    ODALYS (obstructive sleep apnea) 04/01/2013     Diagnostic studies:   ECG 3/2/23  Sinus ,QTC  397, QRS  96       Monitor 11/02/2022 to 12/01/2022 - no atrial fibrillation , high heart rate 137, avg heart rate 58, low heart rate 48, 2 episodes of NSVT longest fastest is 14 beats at 137 bpm/ 6 5 PVC burden, 4% PAC burden    Echo 1/26/2022  -Normal left ventricle size, wall thickness, and systolic function with an estimated ejection fraction of 55-60%.  -No regional wall motion abnormalities are seen.  -Aortic valve appears sclerotic but opens adequately. There is mild-to-moderate aortic insufficiency.  -Thickened mitral valve without evidence of stenosis. There is moderate mitral regurgitation.  -There is mild pulmonic regurgitation.  -Grade III  diastolic dysfunction with elevated LV filling pressures. Avg. E/e'=14.7    Echo 2/26/2021   Normal left ventricle size, wall thickness, and systolic function with an   estimated ejection fraction of 55%. No regional wall motion abnormalities are seen. Grade III diastolic dysfunction with elevated LV filling pressures. 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 There is moderate mitral regurgitation noted. Aortic valve appears sclerotic but opens adequately. Mild to moderate aortic   regurgitation. There is mild tricuspid regurgitation with a RVSP estimation of 44 mmHg. The right ventricle is normal in size and function. [facility] 7/8/2020  LM-30%  LAD-prox 30%, mid 50%, competitive filling filling after mid vessel. LCX-occluded after OM1, Small caliber OM1 with 70% mid vessel. RCA-Occluded mid vessel  LV-mid anterior-lateral HK, EF=50%  LVEDP=10     LIMA=>LAD patent  SVG=>RCA patent  Radial=>OM Patent     IMP:  3 vessel CAD with patent grafts and mild LV dysfunction  Lateral ischemia seen on stress possibley due to OM1  Vessel is small and he has limited symptoms so will treat medically     PLAN:  Continue medical therapy  F/u 6 months     Stress Test 6/12/2020   Summary    There is a moderate sized mild intensity perfusion defect in the lateral    wall of the left ventricle consistent with ischemia. The LVEF is 54% with normal LV wall motion on gated imaging and TID was    normal at 0.98. The Sum difference score of 6 is abnormal.        This is an intermediate risk study. I independently reviewed the cardiac diagnostic studies, ECG and relevant imaging studies.      Lab Results   Component Value Date    LVEF 55 02/10/2023    LVEFMODE Cardiac Cath 07/08/2020     Lab Results   Component Value Date    TSH 2.52 04/27/2021       Physical Examination:  Vitals:    03/02/23 1459   BP: (!) 120/58   Pulse: 64   SpO2: 94%        Wt Readings from Last 3 Encounters:   03/02/23 228 lb (103.4 kg)   02/10/23 229 lb (103.9 kg)   01/27/23 229 lb (103.9 kg)       Constitutional: Oriented. No distress.   Head: Normocephalic and atraumatic.   Mouth/Throat: Oropharynx is clear and moist.   Eyes: Conjunctivae normal. EOM are normal.   Neck: Neck supple. No rigidity. No JVD present.    Cardiovascular: Normal rate, regular rhythm, S1&S2.    Pulmonary/Chest: Bilateral respiratory sounds. No wheezes, No rhonchi.    Abdominal: Soft. Bowel sounds present. No distension, No tenderness.   Musculoskeletal: No tenderness. No edema    Lymphadenopathy: Has no cervical adenopathy.   Neurological: Alert and oriented. Cranial nerve appears intact, No Gross deficit   Skin: Skin is warm and dry. No rash noted.   Psychiatric: Has a normal behavior       Review of System:  [x] Full ROS obtained and negative except as mentioned in HPI    Prior to Admission medications    Medication Sig Start Date End Date Taking? Authorizing Provider   apixaban (ELIQUIS) 5 MG TABS tablet Take by mouth 2 times daily   Yes Historical Provider, MD   atorvastatin (LIPITOR) 10 MG tablet TAKE 1 TABLET BY MOUTH ONE TIME A DAY 1/27/23  Yes Andres Sandhu MD   hydrALAZINE (APRESOLINE) 50 MG tablet Take 1 tablet by mouth 2 times daily 1/27/23  Yes Andres Sandhu MD   lisinopril (PRINIVIL;ZESTRIL) 10 MG tablet Take 1 tablet by mouth daily 1/27/23  Yes Andres Sandhu MD   furosemide (LASIX) 40 MG tablet Take 1 tablet by mouth daily 1/27/23  Yes Andres Sandhu MD   dilTIAZem (TIAZAC) 240 MG extended release capsule  10/19/22  Yes Historical Provider, MD   terazosin (HYTRIN) 1 MG capsule Take 1 mg by mouth nightly   Yes Historical Provider, MD   Turmeric 400 MG CAPS Take by mouth   Yes Historical Provider, MD   CRANBERRY EXTRACT PO Take by mouth   Yes Historical Provider, MD   Resveratrol 100 MG CAPS Take by mouth daily   Yes Historical Provider, MD   Probiotic Product (PROBIOTIC ADVANCED PO) Take by mouth   Yes Historical Provider,  MD   UNABLE TO FIND circulation and vein support   Yes Historical Provider, MD   fexofenadine (ALLEGRA) 180 MG tablet Take 180 mg by mouth daily   Yes Historical Provider, MD   Cholecalciferol (VITAMIN D3) 5000 UNITS TABS Take 1 tablet by mouth daily   Yes Historical Provider, MD   cloNIDine (CATAPRES) 0.1 MG tablet Take 1 tablet by mouth daily  Patient taking differently: Take 0.1 mg by mouth 2 times daily 3/8/16  Yes Osmany Muir MD   magnesium gluconate (MAGONATE) 500 MG tablet Take 2,000 mg by mouth daily    Yes Historical Provider, MD   oxyCODONE (ROXICODONE) 20 MG immediate release tablet Take 20 mg by mouth in the morning, at noon, in the evening, and at bedtime. 4 times a day   Yes Historical Provider, MD   testosterone cypionate (DEPOTESTOTERONE CYPIONATE) 100 MG/ML injection Inject 50 mg into the muscle. Every 3 months. Yes Historical Provider, MD   fish oil-omega-3 fatty acids 1000 MG capsule Take 1 g by mouth daily    Yes Historical Provider, MD   Glucosamine-Chondroit-Biofl-Mn (GLUCOSAMINE CHONDROITIN COMPLX) CAPS Take 1 capsule by mouth daily. Yes Historical Provider, MD   Omeprazole 20 MG TBEC Take 20 mg by mouth daily. Yes Historical Provider, MD   Coenzyme Q10 (COQ10) 100 MG CAPS Take 200 mg by mouth daily Indications: 200-500 MG QD    Yes Historical Provider, MD   Multiple Vitamin (MULTIVITAMIN PO) Take 1 tablet by mouth daily.      Yes Historical Provider, MD   CINNAMON PO Take 1 capsule by mouth daily    Yes Historical Provider, MD   ACETYLCARNITINE HCL PO Take by mouth    Historical Provider, MD   tiZANidine (ZANAFLEX) 4 MG tablet Take 4 mg by mouth every 6 hours as needed   Patient not taking: Reported on 3/2/2023 3/17/17   Historical Provider, MD       Past Medical History:   Diagnosis Date    Acute MI ([facility] [address]     Back pain     CAD (coronary artery disease)     GERD (gastroesophageal reflux disease)     Hyperlipidemia     Hypertension     Sleep apnea         Past Surgical History:   Procedure Laterality Date    BACK SURGERY      x2    CARDIAC SURGERY      3v--2001    CORONARY ARTERY BYPASS GRAFT      CYST REMOVAL N/A 1970    ON BASE OF SPINE    GALLBLADDER SURGERY      HAND SURGERY      left hand deep cut ? tendon repair    PROSTATE BIOPSY  2020       Allergies   Allergen Reactions    Carvedilol      intolerant    Codeine      Large doses cause dizziness    Hydralazine        Social History:  Reviewed.  reports that he quit smoking about 23 years ago. His smoking use included cigarettes. He has never used smokeless tobacco. He reports that he does not drink alcohol and does not use drugs.     Family History:  Reviewed. Reviewed. No family history of SCD.      Relevant and available labs, and cardiovascular diagnostics reviewed.   Reviewed.         I independently reviewed all cardiac tracing.    I independently reviewed relevant and available cardiac diagnostic tests ECG, CXR, Echo, Stress test, Device interrogation, Holter, CT scan.    Outside medical records via Care everywhere reviewed and summarized in H&P above.    Complex medical condition with multiple medical problems affecting prognosis and outcome of EP interventions       - The patient is counseled to follow a low salt diet to assure blood pressure remains controlled for cardiovascular risk factor modification.   - The patient is counseled to avoid excess caffeine, and energy drinks as this may exacerbated ectopy and arrhythmia.   - The patient is counseled to get regular exercise 3-5 times per week to control cardiovascular risk factors.   - The patient is counseled to lose weigt to control cardiovascular risk factors.        Scribe attestation: This note was scribed in the presence of Johnathon Morris MD by Franca Victoria RN    I, Dr. Johnathon Morris personally performed the services described in this documentation as scribed by RN in my presence, and it is both accurate and complete.            NOTE: This report was transcribed  using voice recognition software. Every effort was made to ensure accuracy, however, inadvertent computerized transcription errors may be present.     Johnathon Morris MD, FH[facility], [facility]   Office: [phone]  Fax: [phone]

## 2023-03-23 ENCOUNTER — TELEPHONE (OUTPATIENT)
Dept: PULMONOLOGY | Age: 78
End: 2023-03-23

## 2023-03-23 NOTE — TELEPHONE ENCOUNTER
Patient informed. Patient request that this office contact [employer] re: Interface replacements for his new mask. Tried to explain to patient that all he needs to do is call [employer] because they have the order for them that was faxed 02/22/23. Patient was addiment that we have to call. Fax was sent to [employer].

## 2023-03-23 NOTE — TELEPHONE ENCOUNTER
Patient states he is currently using an Airfit F20, large mask but would like to switch to a different mask before he is eligible for a refill of supplies. Patient states he is having shortness of breath in the morning. Please advise, [phone].

## 2023-03-23 NOTE — TELEPHONE ENCOUNTER
EPAP min increased to 18. Changes sent via machine modem. In setting of Immunosuppression with Cellcept and elevated WBC -14 concerning for infection. Afebrile, /81, HR 87, RR 16 and satting in RA with normal LA, not suggestive of sepsis  CT AP 2/20 suggestive of colitis and like source of infection in setting of Diarrhea and chills.     - Follow up stools studies  - RIP and CXR - Recent URI symptoms  - Follow up Blood cultures  - UA not suggestive of UTI

## 2023-03-27 ENCOUNTER — TELEPHONE (OUTPATIENT)
Dept: PULMONOLOGY | Age: 78
End: 2023-03-27

## 2023-03-27 NOTE — TELEPHONE ENCOUNTER
LM for pt letting him know that the leak has decreased on his download which will make the pressure feel different. Pt was asked to call if he is having issues with use.

## 2023-03-27 NOTE — TELEPHONE ENCOUNTER
Pt wants a call back, he feels like his machine pressures have changed and he thinks his machine or Gennette Diver might have changed them. Please advise.       Ph. [phone]

## 2023-03-27 NOTE — TELEPHONE ENCOUNTER
The pressure on his machine was increased after discussion on 3/23/23. If he is having SOB during the day would recommend f/u with PCP. Can try increasing the pressure more if he feels short of breath when using his machine.

## 2023-03-27 NOTE — TELEPHONE ENCOUNTER
Pt called and is asking if pressure was decreased. Pt is having increased shortness of breath and is asking if pressure was changed to have it changed back. Pt states shortness of breath is lasting throughout the day.

## 2023-03-30 ENCOUNTER — TELEPHONE (OUTPATIENT)
Dept: CARDIOLOGY CLINIC | Age: 78
End: 2023-03-30

## 2023-03-30 NOTE — TELEPHONE ENCOUNTER
Pt states he is more SOB than he has been. Denies Lightheadedness, Dizzy or Chest pains. Just says he is out of breath. Asking for a call back to discuss. Please aadvise.

## 2023-03-30 NOTE — TELEPHONE ENCOUNTER
Last ov [address] 2/10/23 DX CAD HTN HLD CHF next ov 5/23/23 [address] Please advise     Called patient he is having more sob x 1 week with any activity or rest.No edema,and doesn't know his weight. No CP. He called his sleep doctor with this response:    The pressure on his machine was increased after discussion on 3/23/23. If he is having SOB during the day would recommend f/u with PCP. Can try increasing the pressure more if he feels short of breath when using his machine.

## 2023-04-04 ENCOUNTER — HOSPITAL ENCOUNTER (OUTPATIENT)
Age: 78
Discharge: HOME | End: 2023-04-04
Payer: MEDICARE

## 2023-04-04 ENCOUNTER — OFFICE VISIT (OUTPATIENT)
Dept: CARDIOLOGY CLINIC | Age: 78
End: 2023-04-04
Payer: MEDICARE

## 2023-04-04 VITALS
HEART RATE: 70 BPM | BODY MASS INDEX: 36.22 KG/M2 | DIASTOLIC BLOOD PRESSURE: 72 MMHG | HEIGHT: 66 IN | OXYGEN SATURATION: 96 % | WEIGHT: 225.4 LBS | SYSTOLIC BLOOD PRESSURE: 134 MMHG

## 2023-04-04 DIAGNOSIS — R06.02 SOB (SHORTNESS OF BREATH): ICD-10-CM

## 2023-04-04 DIAGNOSIS — I50.32 CHRONIC HEART FAILURE WITH PRESERVED EJECTION FRACTION (HCC): Primary | ICD-10-CM

## 2023-04-04 DIAGNOSIS — I25.118 ATHEROSCLEROSIS OF NATIVE CORONARY ARTERY OF NATIVE HEART WITH OTHER FORM OF ANGINA PECTORIS (HCC): Chronic | ICD-10-CM

## 2023-04-04 DIAGNOSIS — I10 PRIMARY HYPERTENSION: ICD-10-CM

## 2023-04-04 DIAGNOSIS — I48.0 PAROXYSMAL ATRIAL FIBRILLATION (HCC): ICD-10-CM

## 2023-04-04 LAB
ANION GAP SERPL CALCULATED.3IONS-SCNC: 11 MMOL/L (ref 3–16)
BUN SERPL-MCNC: 27 MG/DL (ref 7–20)
CALCIUM SERPL-MCNC: 9.3 MG/DL (ref 8.3–10.6)
CHLORIDE SERPL-SCNC: 100 MMOL/L (ref 99–110)
CO2 SERPL-SCNC: 27 MMOL/L (ref 21–32)
CREAT SERPL-MCNC: 1.2 MG/DL (ref 0.8–1.3)
GFR SERPLBLD CREATININE-BSD FMLA CKD-EPI: >60 ML/MIN/{1.73_M2}
GLUCOSE SERPL-MCNC: 120 MG/DL (ref 70–99)
NT-PROBNP SERPL-MCNC: 191 PG/ML (ref 0–449)
POTASSIUM SERPL-SCNC: 4.8 MMOL/L (ref 3.5–5.1)
SODIUM SERPL-SCNC: 138 MMOL/L (ref 136–145)

## 2023-04-04 PROCEDURE — 1036F TOBACCO NON-USER: CPT | Performed by: NURSE PRACTITIONER

## 2023-04-04 PROCEDURE — 3017F COLORECTAL CA SCREEN DOC REV: CPT | Performed by: NURSE PRACTITIONER

## 2023-04-04 PROCEDURE — 3075F SYST BP GE 130 - 139MM HG: CPT | Performed by: NURSE PRACTITIONER

## 2023-04-04 PROCEDURE — 1123F ACP DISCUSS/DSCN MKR DOCD: CPT | Performed by: NURSE PRACTITIONER

## 2023-04-04 PROCEDURE — 3078F DIAST BP <80 MM HG: CPT | Performed by: NURSE PRACTITIONER

## 2023-04-04 PROCEDURE — G8417 CALC BMI ABV UP PARAM F/U: HCPCS | Performed by: NURSE PRACTITIONER

## 2023-04-04 PROCEDURE — 80048 BASIC METABOLIC PNL TOTAL CA: CPT

## 2023-04-04 PROCEDURE — 36415 COLL VENOUS BLD VENIPUNCTURE: CPT

## 2023-04-04 PROCEDURE — 99214 OFFICE O/P EST MOD 30 MIN: CPT | Performed by: NURSE PRACTITIONER

## 2023-04-04 PROCEDURE — G8427 DOCREV CUR MEDS BY ELIG CLIN: HCPCS | Performed by: NURSE PRACTITIONER

## 2023-04-04 PROCEDURE — 83880 ASSAY OF NATRIURETIC PEPTIDE: CPT

## 2023-04-04 RX ORDER — ISOSORBIDE MONONITRATE 30 MG/1
30 TABLET, EXTENDED RELEASE ORAL DAILY
Qty: 30 TABLET | Refills: 3 | Status: SHIPPED | OUTPATIENT
Start: 2023-04-04

## 2023-04-04 NOTE — PROGRESS NOTES
found for: CHLPL  Lab Results   Component Value Date    TRIG 43 10/25/2021    TRIG 58 04/27/2021    TRIG 66 10/26/2020     Lab Results   Component Value Date    HDL 53 02/07/2023    HDL 55 10/25/2021    HDL 49 04/27/2021     Lab Results   Component Value Date    LDLCALC 123 (H) 02/07/2023    LDLCALC 62 10/25/2021    LDLCALC 73 04/27/2021     Lab Results   Component Value Date    LABVLDL 16 02/07/2023    LABVLDL 9 10/25/2021    LABVLDL 12 04/27/2021     Radiology Review:  Pertinent images / reports were reviewed as a part of this visit and reveals the following:    [id number] Score for Atrial Fibrillation Stroke Risk   Risk   Factors  Component Value   C CHF Yes 1   H HTN Yes 1   A2 Age >= 76 Yes,  (71 y.o.) 2   D DM No 0   S2 Prior Stroke/TIA No 0   V Vascular Disease No 1   A Age 74-69 No,  (71 y.o.) 0   Sc Sex male 0    [id number]  Score  5   Score last updated 11/16/22 8:25 AM EST      MYOVIEW 2/2023  Summary    Myocardial perfusion is abnormal.    There is a moderate sized, severe intensity, fixed lateral defect c/w scar. Overall LV function is normal with EF=55% with mild lateral hypokinesis. Intermediate risk. Monitor 11/02/2022 to 12/01/2022   - no atrial fibrillation , high heart rate 137, avg heart rate 58, low heart rate 48,   2 episodes of NSVT longest fastest is 14 beats at 137 bpm/  6 % PVC burden  4% PAC burden    Echo: Jan '22   Summary   -Normal left ventricle size, wall thickness, and systolic function with an estimated ejection fraction of 55-60%. -No regional wall motion abnormalities are seen.   -Aortic valve appears sclerotic but opens adequately. There is mild-to-moderate aortic insufficiency.   -Thickened mitral valve without evidence of stenosis. There is moderate mitral regurgitation.   -There is mild pulmonic regurgitation.   -Grade III diastolic dysfunction with elevated LV filling pressures.  Avg. E/e'=14.7    Stress Test: June '20  Summary    There is a moderate sized mild

## 2023-04-04 NOTE — LETTER
April 4, 2023      Jennifer Shanks, [id number] [facility], [address] [address]      Patient: Carina Martin   MR Number: [id number]   YOB: 1945   Date of Visit: 4/4/2023       Dear Jennifer Shanks:    Thank you for referring Carina Martin to me for evaluation/treatment. Below are the relevant portions of my assessment and plan of care. If you have questions, please do not hesitate to call me. I look forward to following Mala De La O along with you.     Sincerely,        NEO Sanchez - CNP

## 2023-04-06 ENCOUNTER — TELEPHONE (OUTPATIENT)
Dept: CARDIOLOGY CLINIC | Age: 78
End: 2023-04-06

## 2023-04-11 ENCOUNTER — TELEPHONE (OUTPATIENT)
Dept: CARDIOLOGY CLINIC | Age: 78
End: 2023-04-11

## 2023-04-26 PROBLEM — I48.0 PAROXYSMAL ATRIAL FIBRILLATION (HCC): Chronic | Status: ACTIVE | Noted: 2022-11-02

## 2023-05-04 ENCOUNTER — OFFICE VISIT (OUTPATIENT)
Dept: CARDIOLOGY CLINIC | Age: 78
End: 2023-05-04
Payer: MEDICARE

## 2023-05-04 VITALS
HEIGHT: 66 IN | DIASTOLIC BLOOD PRESSURE: 62 MMHG | HEART RATE: 66 BPM | BODY MASS INDEX: 37.51 KG/M2 | OXYGEN SATURATION: 93 % | WEIGHT: 233.4 LBS | SYSTOLIC BLOOD PRESSURE: 130 MMHG

## 2023-05-04 DIAGNOSIS — I48.0 PAROXYSMAL ATRIAL FIBRILLATION (HCC): ICD-10-CM

## 2023-05-04 DIAGNOSIS — I10 PRIMARY HYPERTENSION: ICD-10-CM

## 2023-05-04 DIAGNOSIS — I50.32 CHRONIC HEART FAILURE WITH PRESERVED EJECTION FRACTION (HCC): Primary | ICD-10-CM

## 2023-05-04 DIAGNOSIS — I25.10 ATHEROSCLEROSIS OF NATIVE CORONARY ARTERY OF NATIVE HEART WITHOUT ANGINA PECTORIS: Chronic | ICD-10-CM

## 2023-05-04 PROCEDURE — 1123F ACP DISCUSS/DSCN MKR DOCD: CPT | Performed by: NURSE PRACTITIONER

## 2023-05-04 PROCEDURE — 1036F TOBACCO NON-USER: CPT | Performed by: NURSE PRACTITIONER

## 2023-05-04 PROCEDURE — 99214 OFFICE O/P EST MOD 30 MIN: CPT | Performed by: NURSE PRACTITIONER

## 2023-05-04 PROCEDURE — 3017F COLORECTAL CA SCREEN DOC REV: CPT | Performed by: NURSE PRACTITIONER

## 2023-05-04 PROCEDURE — G8417 CALC BMI ABV UP PARAM F/U: HCPCS | Performed by: NURSE PRACTITIONER

## 2023-05-04 PROCEDURE — 3078F DIAST BP <80 MM HG: CPT | Performed by: NURSE PRACTITIONER

## 2023-05-04 PROCEDURE — 3074F SYST BP LT 130 MM HG: CPT | Performed by: NURSE PRACTITIONER

## 2023-05-04 PROCEDURE — G8427 DOCREV CUR MEDS BY ELIG CLIN: HCPCS | Performed by: NURSE PRACTITIONER

## 2023-05-04 RX ORDER — FUROSEMIDE 40 MG/1
80 TABLET ORAL DAILY
Qty: 180 TABLET | Refills: 1 | Status: SHIPPED | OUTPATIENT
Start: 2023-05-04

## 2023-05-04 RX ORDER — CLONIDINE HYDROCHLORIDE 0.1 MG/1
0.1 TABLET ORAL NIGHTLY
Qty: 30 TABLET | Refills: 5
Start: 2023-05-04

## 2023-05-04 NOTE — PATIENT INSTRUCTIONS
Decrease clonidine to 0.1 mg once a day and take in the evening    Increase furosemide / lasix to 80 mg daily    Non-fasting labs after one week    Keep appt with Dr. El Ybarra 5/23

## 2023-05-04 NOTE — PROGRESS NOTES
[address]     Outpatient Follow Up Note    Armond Dandy is 78 y.o. male who presents today with a history of CAD s/p CABG '01 ([facility]) ; HTN, aortic regurg, PAF and hyperlipidemia. CHIEF COMPLAINT / HPI:  Follow Up secondary to dHF. His BNP returned at 191. His wt is up 8# / four weeks by office scales  He feels bloated and holding wt in his stomach. Subjective:   He thinks his last home wt was 228.9#. his memory is terrible so is guessing that's what it was. He denies chest discomfort. He c/o SOB and substantially worse. He can walk about 20 steps before feeling SOB. He denies orthopnea/PND. He doesn't think he has swelling in his legs. He took isosorbide for about 10 days. It hadn't helped and gave him HAs so he stopped taking it. He didn't start Ranexa since the pharmacist told him it was for CP. He wasn't having CP    He does very little d/t back & leg from injuries and subsequent surgeries. His BP runs 120/50s    He was told that his medications maybe what's causing his fatigue and not his sleep apnea. He wears his CPAP nightly. He denies palpitations. These symptoms have not improved since the last OV. With regard to medication therapy the patient has been compliant with prescribed regimen. They have tolerated therapy to date.      Past Medical History:   Diagnosis Date    Acute MI ([facility] [address]     Back pain     CAD (coronary artery disease)     GERD (gastroesophageal reflux disease)     Hyperlipidemia     Hypertension     Sleep apnea      Social History:    Social History     Tobacco Use   Smoking Status Former    Types: Cigarettes    Quit date: 2000    Years since quittin.2   Smokeless Tobacco Never     Current Medications:  Current Outpatient Medications   Medication Sig Dispense Refill    apixaban (ELIQUIS) 5 MG TABS tablet Take by mouth 2 times daily      atorvastatin (LIPITOR) 10 MG tablet TAKE 1 TABLET BY MOUTH ONE TIME A DAY 90 tablet 4    hydrALAZINE

## 2023-05-05 ENCOUNTER — TELEPHONE (OUTPATIENT)
Dept: CARDIOLOGY CLINIC | Age: 78
End: 2023-05-05

## 2023-05-17 ENCOUNTER — HOSPITAL ENCOUNTER (OUTPATIENT)
Age: 78
Discharge: HOME | End: 2023-05-17
Payer: MEDICARE

## 2023-05-17 DIAGNOSIS — I50.32 CHRONIC HEART FAILURE WITH PRESERVED EJECTION FRACTION (HCC): ICD-10-CM

## 2023-05-17 DIAGNOSIS — I25.10 ATHEROSCLEROSIS OF NATIVE CORONARY ARTERY OF NATIVE HEART WITHOUT ANGINA PECTORIS: Chronic | ICD-10-CM

## 2023-05-17 LAB
ALT SERPL-CCNC: 17 U/L (ref 10–40)
ANION GAP SERPL CALCULATED.3IONS-SCNC: 10 MMOL/L (ref 3–16)
AST SERPL-CCNC: 34 U/L (ref 15–37)
BUN SERPL-MCNC: 22 MG/DL (ref 7–20)
CALCIUM SERPL-MCNC: 9.1 MG/DL (ref 8.3–10.6)
CHLORIDE SERPL-SCNC: 105 MMOL/L (ref 99–110)
CHOLEST SERPL-MCNC: 131 MG/DL (ref 0–199)
CO2 SERPL-SCNC: 27 MMOL/L (ref 21–32)
CREAT SERPL-MCNC: 0.9 MG/DL (ref 0.8–1.3)
GFR SERPLBLD CREATININE-BSD FMLA CKD-EPI: >60 ML/MIN/{1.73_M2}
GLUCOSE SERPL-MCNC: 111 MG/DL (ref 70–99)
HDLC SERPL-MCNC: 53 MG/DL (ref 40–60)
LDL CHOLESTEROL CALCULATED: 34 MG/DL
POTASSIUM SERPL-SCNC: 4.7 MMOL/L (ref 3.5–5.1)
SODIUM SERPL-SCNC: 142 MMOL/L (ref 136–145)
TRIGL SERPL-MCNC: 220 MG/DL (ref 0–150)
VLDLC SERPL CALC-MCNC: 44 MG/DL

## 2023-05-17 PROCEDURE — 80061 LIPID PANEL: CPT

## 2023-05-17 PROCEDURE — 84450 TRANSFERASE (AST) (SGOT): CPT

## 2023-05-17 PROCEDURE — 80048 BASIC METABOLIC PNL TOTAL CA: CPT

## 2023-05-17 PROCEDURE — 84460 ALANINE AMINO (ALT) (SGPT): CPT

## 2023-05-17 PROCEDURE — 36415 COLL VENOUS BLD VENIPUNCTURE: CPT

## 2023-05-23 ENCOUNTER — HOSPITAL ENCOUNTER (OUTPATIENT)
Dept: NON INVASIVE DIAGNOSTICS | Age: 78
Discharge: HOME | End: 2023-05-23
Payer: MEDICARE

## 2023-05-23 ENCOUNTER — OFFICE VISIT (OUTPATIENT)
Dept: CARDIOLOGY CLINIC | Age: 78
End: 2023-05-23
Payer: MEDICARE

## 2023-05-23 VITALS
HEART RATE: 66 BPM | DIASTOLIC BLOOD PRESSURE: 62 MMHG | HEIGHT: 66 IN | OXYGEN SATURATION: 96 % | BODY MASS INDEX: 37.45 KG/M2 | SYSTOLIC BLOOD PRESSURE: 124 MMHG | WEIGHT: 233 LBS

## 2023-05-23 DIAGNOSIS — E78.2 MIXED HYPERLIPIDEMIA: Primary | ICD-10-CM

## 2023-05-23 DIAGNOSIS — G47.33 OSA (OBSTRUCTIVE SLEEP APNEA): ICD-10-CM

## 2023-05-23 DIAGNOSIS — I25.10 ATHEROSCLEROSIS OF NATIVE CORONARY ARTERY OF NATIVE HEART WITHOUT ANGINA PECTORIS: ICD-10-CM

## 2023-05-23 DIAGNOSIS — I34.0 NONRHEUMATIC MITRAL VALVE REGURGITATION: ICD-10-CM

## 2023-05-23 DIAGNOSIS — R06.09 DYSPNEA ON EXERTION: ICD-10-CM

## 2023-05-23 DIAGNOSIS — I10 PRIMARY HYPERTENSION: ICD-10-CM

## 2023-05-23 DIAGNOSIS — I50.32 CHRONIC DIASTOLIC HEART FAILURE (HCC): ICD-10-CM

## 2023-05-23 DIAGNOSIS — I50.32 CHRONIC HEART FAILURE WITH PRESERVED EJECTION FRACTION (HCC): ICD-10-CM

## 2023-05-23 DIAGNOSIS — I48.0 PAROXYSMAL ATRIAL FIBRILLATION (HCC): Chronic | ICD-10-CM

## 2023-05-23 LAB
LV EF: 55 %
LVEF MODALITY: NORMAL

## 2023-05-23 PROCEDURE — 93306 TTE W/DOPPLER COMPLETE: CPT

## 2023-05-23 PROCEDURE — 1036F TOBACCO NON-USER: CPT | Performed by: INTERNAL MEDICINE

## 2023-05-23 PROCEDURE — G8427 DOCREV CUR MEDS BY ELIG CLIN: HCPCS | Performed by: INTERNAL MEDICINE

## 2023-05-23 PROCEDURE — 3078F DIAST BP <80 MM HG: CPT | Performed by: INTERNAL MEDICINE

## 2023-05-23 PROCEDURE — 99214 OFFICE O/P EST MOD 30 MIN: CPT | Performed by: INTERNAL MEDICINE

## 2023-05-23 PROCEDURE — 1123F ACP DISCUSS/DSCN MKR DOCD: CPT | Performed by: INTERNAL MEDICINE

## 2023-05-23 PROCEDURE — 3074F SYST BP LT 130 MM HG: CPT | Performed by: INTERNAL MEDICINE

## 2023-05-23 PROCEDURE — 3017F COLORECTAL CA SCREEN DOC REV: CPT | Performed by: INTERNAL MEDICINE

## 2023-05-23 PROCEDURE — G8417 CALC BMI ABV UP PARAM F/U: HCPCS | Performed by: INTERNAL MEDICINE

## 2023-05-23 NOTE — PATIENT INSTRUCTIONS
If you start Entresto you Must stop lisinopril for at least 48 hours before starting  Will send prescription for Entresto to [facility] for 30 days to determine cost of it > call [phone] with info regarding cost  Follow up with Dr El Ybarra 3 mos

## 2023-05-30 ENCOUNTER — TELEPHONE (OUTPATIENT)
Dept: CARDIOLOGY CLINIC | Age: 78
End: 2023-05-30

## 2023-05-30 DIAGNOSIS — I10 ESSENTIAL HYPERTENSION, BENIGN: Chronic | ICD-10-CM

## 2023-05-30 RX ORDER — LISINOPRIL 10 MG/1
10 TABLET ORAL DAILY
Qty: 90 TABLET | Refills: 4
Start: 2023-05-30

## 2023-05-30 NOTE — TELEPHONE ENCOUNTER
Entresto cost prohibitive. Patient should just continue Lisinopril as he has been taking. Discussed with patient. He verbalized understanding.

## 2023-05-30 NOTE — TELEPHONE ENCOUNTER
Pt called office stating the ENTRESTO cost to much ir is 711.00 + a 47.00 copay, pt would like to know if there us another medication they can get ?     Pls advise thank you

## 2023-07-03 NOTE — PROGRESS NOTES
[facility]     Outpatient Follow Up Note    Kyle Felton is 78 y.o. male who presents today with a history of CAD s/p CABG '01 ([facility]) ; HTN, aortic regurg, PAF and hyperlipidemia. CHIEF COMPLAINT / HPI:  Follow Up secondary to hypertension    Subjective:   He denies significant chest pain. However, sometimes his chest is a little sore. There is SOB at times going up steps. He does ok walking on flat surfaces. He follows with pul for his sleep apnea. He's frustrated that they haven't been able to get his settings right. He is not a candidate for an Inspire d/t degree of his apnea. The patient denies orthopnea/PND. The patient does not have swelling. The patients weight is stable . The patient is not experiencing palpitations or dizziness. Sometimes he feels a little light headed. He's wearing a heart monitor but struggles getting it popped in. He has it held on with tape. His stomach bothers him and attributes to his newer medicines: diltiazem and blood thinner. He's been taking 2 omeprazole which helps some. These symptoms show no change since the last OV. With regard to medication therapy the patient has been compliant with prescribed regimen. They have tolerated therapy to date.      Past Medical History:   Diagnosis Date    Acute MI ([address]     Back pain     CAD (coronary artery disease)     GERD (gastroesophageal reflux disease)     Hyperlipidemia     Hypertension     Sleep apnea      Social History:    Social History     Tobacco Use   Smoking Status Former    Types: Cigarettes    Quit date: 2000    Years since quittin.7   Smokeless Tobacco Never     Current Medications:  Current Outpatient Medications   Medication Sig Dispense Refill    dilTIAZem (TIAZAC) 240 MG extended release capsule       terazosin (HYTRIN) 1 MG capsule Take 1 mg by mouth nightly      furosemide (LASIX) 40 MG tablet Take 0.5 tablets by mouth daily (Patient taking differently: Take 40 mg by mouth daily) 90 tablet 3    lisinopril (PRINIVIL;ZESTRIL) 10 MG tablet Take 1 tablet by mouth daily 90 tablet 4    hydrALAZINE (APRESOLINE) 50 MG tablet Take 1 tablet by mouth 2 times daily 180 tablet 4    atorvastatin (LIPITOR) 10 MG tablet TAKE 1 TABLET BY MOUTH ONE TIME A DAY 90 tablet 4    Turmeric 400 MG CAPS Take by mouth      CRANBERRY EXTRACT PO Take by mouth      Resveratrol 100 MG CAPS Take by mouth daily      Probiotic Product (PROBIOTIC ADVANCED PO) Take by mouth      ACETYLCARNITINE HCL PO Take by mouth      UNABLE TO FIND circulation and vein support      tiZANidine (ZANAFLEX) 4 MG tablet Take 4 mg by mouth every 6 hours as needed       fexofenadine (ALLEGRA) 180 MG tablet Take 180 mg by mouth daily      Cholecalciferol (VITAMIN D3) 5000 UNITS TABS Take 1 tablet by mouth daily      cloNIDine (CATAPRES) 0.1 MG tablet Take 1 tablet by mouth daily (Patient taking differently: Take 0.1 mg by mouth 2 times daily) 30 tablet 5    magnesium gluconate (MAGONATE) 500 MG tablet Take 2,000 mg by mouth daily       oxyCODONE (OXY-IR) 30 MG immediate release tablet Take 15 mg by mouth. 4 times a day      testosterone cypionate (DEPOTESTOTERONE CYPIONATE) 100 MG/ML injection Inject 50 mg into the muscle. Every 3 months. fish oil-omega-3 fatty acids 1000 MG capsule Take 1 g by mouth daily       Glucosamine-Chondroit-Biofl-Mn (GLUCOSAMINE CHONDROITIN COMPLX) CAPS Take 1 capsule by mouth daily. Omeprazole 20 MG TBEC Take 20 mg by mouth daily. Coenzyme Q10 (COQ10) 100 MG CAPS Take 200 mg by mouth daily Indications: 200-500 MG QD       Multiple Vitamin (MULTIVITAMIN PO) Take 1 tablet by mouth daily. CINNAMON PO Take 1 capsule by mouth daily        No current facility-administered medications for this visit. REVIEW OF SYSTEMS:    CONSTITUTIONAL: No major weight gain or loss, fatigue, weakness, night sweats or fever. HEENT: No new vision difficulties or ringing in the ears.   RESPIRATORY: No new SOB, PND, orthopnea or cough. CARDIOVASCULAR: See HPI  GI: No nausea, vomiting, diarrhea, constipation, abdominal pain or changes in bowel habits. : No urinary frequency, urgency, incontinence hematuria or dysuria. SKIN: No cyanosis or skin lesions. MUSCULOSKELETAL: No new muscle or joint pain. NEUROLOGICAL: No syncope or TIA-like symptoms. PSYCHIATRIC: No anxiety, pain, insomnia or depression    Objective:   PHYSICAL EXAM:        Vitals:    11/15/22 1515 11/15/22 1535 11/15/22 1536   BP: (!) 140/50 124/62 (!) 134/58   Site: Right Upper Arm Right Upper Arm Left Upper Arm   Position: Sitting     Cuff Size: Large Adult Large Adult Large Adult   Pulse: 64     SpO2: 96%     Weight: 226 lb 12.8 oz (102.9 kg)     Height: 5' 5.5\" (1.664 m)         VITALS:  BP (!) 140/50 (Site: Right Upper Arm, Position: Sitting, Cuff Size: Large Adult)   Pulse 64   Ht 5' 5.5\" (1.664 m)   Wt 226 lb 12.8 oz (102.9 kg)   SpO2 96%   BMI 37.17 kg/m²   CONSTITUTIONAL: Cooperative, no apparent distress, and appears well nourished / developed  NEUROLOGIC:  Awake and orientated to person, place and time. PSYCH: Calm affect. SKIN: Warm and dry. HEENT: Sclera non-icteric, normocephalic, neck supple, no elevation of JVP, normal carotid pulses with no bruits and thyroid normal size. LUNGS:  No increased work of breathing and clear to auscultation, no crackles or wheezing  CARDIOVASCULAR:  Regular rate and rhythm with no murmurs, gallops, rubs, or abnormal heart sounds, normal PMI. The apical impulses not displaced  JVP less than 8 cm H2O  Heart tones are crisp and normal  Cervical veins are not engorged  The carotid upstroke is normal in amplitude and contour without delay or bruit  JVP is not elevated  ABDOMEN:  Normal bowel sounds, non-distended and non-tender to palpation  EXT: No edema, no calf tenderness. Pulses are present bilaterally.     DATA:    Lab Results   Component Value Date    ALT 16 10/25/2021    AST 24 10/25/2021    ALKPHOS 60 10/25/2021    BILITOT 0.8 10/25/2021     Lab Results   Component Value Date    CREATININE 1.5 (H) 07/11/2022    BUN 34 (H) 07/11/2022     07/11/2022    K 4.8 07/11/2022    CL 99 07/11/2022    CO2 28 07/11/2022     Lab Results   Component Value Date    TSH 2.52 04/27/2021     No components found for: CHLPL  Lab Results   Component Value Date    TRIG 43 10/25/2021    TRIG 58 04/27/2021    TRIG 66 10/26/2020     Lab Results   Component Value Date    HDL 55 10/25/2021    HDL 49 04/27/2021    HDL 49 10/26/2020     Lab Results   Component Value Date    LDLCALC 62 10/25/2021    [address] 04/27/2021 [address] 60 10/26/2020     Lab Results   Component Value Date    LABVLDL 9 10/25/2021    LABVLDL 12 04/27/2021    LABVLDL 13 10/26/2020     Radiology Review:  Pertinent images / reports were reviewed as a part of this visit and reveals the following:    [id number] Score for Atrial Fibrillation Stroke Risk   Risk   Factors  Component Value   C CHF Yes 1   H HTN Yes 1   A2 Age >= 76 Yes,  (71 y.o.) 2   D DM No 0   S2 Prior Stroke/TIA No 0   V Vascular Disease No 1   A Age 74-69 No,  (71 y.o.) 0   Sc Sex male 0    [id number]  Score  5   Score last updated 11/16/22 8:25 AM EST    Echo: Jan '22   Summary   -Normal left ventricle size, wall thickness, and systolic function with an estimated ejection fraction of 55-60%. -No regional wall motion abnormalities are seen.   -Aortic valve appears sclerotic but opens adequately. There is mild-to-moderate aortic insufficiency.   -Thickened mitral valve without evidence of stenosis. There is moderate mitral regurgitation.   -There is mild pulmonic regurgitation.   -Grade III diastolic dysfunction with elevated LV filling pressures. Avg. E/e'=14.7    Stress Test: June '20  Summary    There is a moderate sized mild intensity perfusion defect in the lateral    wall of the left ventricle consistent with ischemia.     The LVEF is 54% with normal LV wall motion on gated imaging and TID was    normal at 0.98. The Sum difference score of 6 is abnormal.        This is an intermediate risk study. CARDIAC CATH 7/2020  LM-30%  LAD-prox 30%, mid 50%, competitive filling filling after mid vessel. LCX-occluded after OM1, Small caliber OM1 with 70% mid vessel. RCA-Occluded mid vessel  LV-mid anterior-lateral HK, EF=50%  LVEDP=10        LIMA=>LAD patent  SVG=>RCA patent  Radial=>OM Patent     IMP:  3 vessel CAD with patent grafts and mild LV dysfunction  Lateral ischemia seen on stress possibley due to OM1  Vessel is small and he has limited symptoms so will treat medically    Assessment:      Diagnosis Orders   1. Primary hypertension   ~controlled on clonidine / hydralazine / lisinopril        2. Chronic diastolic heart failure (HCC)   ~stable / does not appear fluid overloaded  ~lisinopril / furosemide        3. PAF (paroxysmal atrial fibrillation) ([facility])   ~denies palpitations ; event monitor ongoing  ~AP regular  ~struggling with ODALYS tx  ~struggling with apply MCOT : has monitored taped down (assisted with reapplying. Did not have snapped into place nor in correction position on chest)  ~diltiazem / DOAC prescribed by PCP       4. Coronary artery disease without angina   ~stable : denies angina   ~s/p CABG '01   ~patent bypass conduits by cath '20; small caliber OM-1 with 70% stenosis with abnl NM findings of lateral wall ischemia   ~EF 55-60% on echo      I had the opportunity to review the clinical symptoms and presentation of Kristopher Shown. Plan:     Continue present management   F/U as scheduled with Dr. Freedom Amaya    Overall the patient is stable from CV standpoint    I have addresed the patient's cardiac risk factors and adjusted pharmacologic treatment as needed. In addition, I have reinforced the need for patient directed risk factor modification. Further evaluation will be based upon the patient's clinical course and testing results.     All questions and concerns were addressed to the patient Alternatives to my treatment were discussed. The patient is not currently smoking. The risks related to smoking were reviewed with the patient. Recommend maintaining a smoke-free lifestyle. Patient is not on a beta-blocker  Patient is on an ace-i/ARB  Patient is on a statin    anti-coagulation has been recommended / prescribed for this patient. Education conducted on adverse reactions including bleeding was discussed. Daily weight, low sodium diet were discussed. Patient instructed to call the office with a weight gain: > 3 # over night or 5# in one week; swelling, SOB/orthopnea/PND    The patient verbalizes understanding not to stop medications without discussing with us. Discussed exercise: 30-60 minutes 7 days/week  Discussed Low saturated fat/LOC diet. Thank you for allowing to us to participate in the care of Leander Pérez.     NEO Hood    Documentation of today's visit sent to PCP normal/clear to auscultation bilaterally/no wheezes/no rales/no rhonchi

## 2023-07-28 ENCOUNTER — HOSPITAL ENCOUNTER (OUTPATIENT)
Age: 78
Discharge: HOME | End: 2023-07-28
Payer: MEDICARE

## 2023-07-28 ENCOUNTER — OFFICE VISIT (OUTPATIENT)
Dept: CARDIOLOGY CLINIC | Age: 78
End: 2023-07-28
Payer: MEDICARE

## 2023-07-28 VITALS
SYSTOLIC BLOOD PRESSURE: 122 MMHG | DIASTOLIC BLOOD PRESSURE: 58 MMHG | BODY MASS INDEX: 36.48 KG/M2 | HEART RATE: 67 BPM | HEIGHT: 66 IN | WEIGHT: 227 LBS | OXYGEN SATURATION: 96 %

## 2023-07-28 DIAGNOSIS — I10 ESSENTIAL HYPERTENSION, BENIGN: Chronic | ICD-10-CM

## 2023-07-28 DIAGNOSIS — I25.10 ATHEROSCLEROSIS OF NATIVE CORONARY ARTERY OF NATIVE HEART WITHOUT ANGINA PECTORIS: Chronic | ICD-10-CM

## 2023-07-28 DIAGNOSIS — E87.5 HYPERKALEMIA: ICD-10-CM

## 2023-07-28 DIAGNOSIS — I25.10 ATHEROSCLEROSIS OF NATIVE CORONARY ARTERY OF NATIVE HEART WITHOUT ANGINA PECTORIS: Primary | Chronic | ICD-10-CM

## 2023-07-28 DIAGNOSIS — E78.49 OTHER HYPERLIPIDEMIA: Chronic | ICD-10-CM

## 2023-07-28 PROCEDURE — 3074F SYST BP LT 130 MM HG: CPT | Performed by: INTERNAL MEDICINE

## 2023-07-28 PROCEDURE — 1036F TOBACCO NON-USER: CPT | Performed by: INTERNAL MEDICINE

## 2023-07-28 PROCEDURE — 80048 BASIC METABOLIC PNL TOTAL CA: CPT

## 2023-07-28 PROCEDURE — G8417 CALC BMI ABV UP PARAM F/U: HCPCS | Performed by: INTERNAL MEDICINE

## 2023-07-28 PROCEDURE — 3078F DIAST BP <80 MM HG: CPT | Performed by: INTERNAL MEDICINE

## 2023-07-28 PROCEDURE — G8427 DOCREV CUR MEDS BY ELIG CLIN: HCPCS | Performed by: INTERNAL MEDICINE

## 2023-07-28 PROCEDURE — 1123F ACP DISCUSS/DSCN MKR DOCD: CPT | Performed by: INTERNAL MEDICINE

## 2023-07-28 PROCEDURE — 36415 COLL VENOUS BLD VENIPUNCTURE: CPT

## 2023-07-28 PROCEDURE — 99214 OFFICE O/P EST MOD 30 MIN: CPT | Performed by: INTERNAL MEDICINE

## 2023-07-28 PROCEDURE — 3017F COLORECTAL CA SCREEN DOC REV: CPT | Performed by: INTERNAL MEDICINE

## 2023-07-28 RX ORDER — BERBERINE CHLOR/SEAWEED/CHROM 500-250 MG
CAPSULE ORAL
COMMUNITY

## 2023-07-28 NOTE — PATIENT INSTRUCTIONS
OK to stop clonidine. Stay on Lisinopril 10mg for now but may double it depending on lab results. Check your blood pressure mid-day several times a week. Lab work today, in 2 weeks, in 6 weeks, & in 4 months. Until the lab results are back, limit foods high in potassium (see list). Talk to Dr. Dianna Deleon about taking an anti-inflammatory.

## 2023-07-30 LAB
ANION GAP SERPL CALCULATED.3IONS-SCNC: 21 MMOL/L (ref 3–16)
BUN SERPL-MCNC: 28 MG/DL (ref 7–20)
CALCIUM SERPL-MCNC: 9.6 MG/DL (ref 8.3–10.6)
CHLORIDE SERPL-SCNC: 100 MMOL/L (ref 99–110)
CO2 SERPL-SCNC: 20 MMOL/L (ref 21–32)
CREAT SERPL-MCNC: 1.3 MG/DL (ref 0.8–1.3)
GFR SERPLBLD CREATININE-BSD FMLA CKD-EPI: 57 ML/MIN/{1.73_M2}
GLUCOSE SERPL-MCNC: 92 MG/DL (ref 70–99)
POTASSIUM SERPL-SCNC: 4.5 MMOL/L (ref 3.5–5.1)
SODIUM SERPL-SCNC: 141 MMOL/L (ref 136–145)

## 2023-08-04 ENCOUNTER — TELEPHONE (OUTPATIENT)
Dept: CARDIOLOGY CLINIC | Age: 78
End: 2023-08-04

## 2023-08-04 NOTE — TELEPHONE ENCOUNTER
Garret Oneill RN   8/4/2023  9:53 AM EDT Back to Top      I spoke to Mr. Agapito Harrington. He said his pressures have been in the 120's. He will let us know if it starts creeping up more consistently. Otherwise, he will bring readings to his OV with Monique in Sept.    MD Garret Gutierrez, RN  He can wait if he desires and follow BP off of clonidine. If it goes up he needs to then increase lisinopril. WILMAR Guerrero   7/31/2023  4:43 PM EDT       Please see below. Garret Oneill RN   7/31/2023  4:42 PM EDT       Mr. Agapito Harrington is hesitant to double Lisinopril; he said his B/P last night was 128/50's (151/59) earlier in the day. He wants to continue to check it and bring readings to his OV with Monique. I told him 150's is not a good pressure, and he should double it juju since stopping clonidine. But he wanted to wait to double check with you. Lord Charles MD   7/31/2023  3:46 PM EDT       K+ good  Increase lisinopril to BID  F/U NP as planned with chem-7 prior.

## 2023-08-14 ENCOUNTER — TELEPHONE (OUTPATIENT)
Dept: CARDIOLOGY CLINIC | Age: 78
End: 2023-08-14

## 2023-08-14 DIAGNOSIS — I10 PRIMARY HYPERTENSION: Primary | ICD-10-CM

## 2023-08-14 NOTE — TELEPHONE ENCOUNTER
Spoke to patient. Standing order placed for 3 occurrences for lab draws. He is getting them tomorrow and notified him that we will call him with results and let him know when to get next lab draw. He verbalized understanding.     (Original plan was 2 weeks, 6 weeks, and 4 months)

## 2023-08-14 NOTE — TELEPHONE ENCOUNTER
Pt stopped in office to adv he needs to have labs done every 2 weeks for 6-8 weeks in a row. He came in today to have it done, but order are not in [address] [place] [address] 15. Can you please put in \"standing orders\" for the labs so he can have it done today? Please advise. Thank you.

## 2023-08-15 ENCOUNTER — HOSPITAL ENCOUNTER (OUTPATIENT)
Age: 78
Discharge: HOME | End: 2023-08-15
Payer: MEDICARE

## 2023-08-15 DIAGNOSIS — I10 PRIMARY HYPERTENSION: ICD-10-CM

## 2023-08-15 LAB
ANION GAP SERPL CALCULATED.3IONS-SCNC: 13 MMOL/L (ref 3–16)
BUN SERPL-MCNC: 23 MG/DL (ref 7–20)
CALCIUM SERPL-MCNC: 9.1 MG/DL (ref 8.3–10.6)
CHLORIDE SERPL-SCNC: 103 MMOL/L (ref 99–110)
CO2 SERPL-SCNC: 24 MMOL/L (ref 21–32)
CREAT SERPL-MCNC: 1.1 MG/DL (ref 0.8–1.3)
GFR SERPLBLD CREATININE-BSD FMLA CKD-EPI: >60 ML/MIN/{1.73_M2}
GLUCOSE SERPL-MCNC: 109 MG/DL (ref 70–99)
POTASSIUM SERPL-SCNC: 4.2 MMOL/L (ref 3.5–5.1)
SODIUM SERPL-SCNC: 140 MMOL/L (ref 136–145)

## 2023-08-15 PROCEDURE — 36415 COLL VENOUS BLD VENIPUNCTURE: CPT

## 2023-08-15 PROCEDURE — 80048 BASIC METABOLIC PNL TOTAL CA: CPT

## 2023-08-16 ENCOUNTER — TELEPHONE (OUTPATIENT)
Dept: CARDIOLOGY CLINIC | Age: 78
End: 2023-08-16

## 2023-08-16 DIAGNOSIS — I10 ESSENTIAL HYPERTENSION, BENIGN: Chronic | ICD-10-CM

## 2023-08-16 NOTE — TELEPHONE ENCOUNTER
[facility] been running 150/60'S P 70-80. He stated he can feel his heart beat in his ear's. No other symptoms.  Patient was wanting to know if he should increase Lisinopril as dicussed at last ov.        07/28/2023 0V  Plan:  Stop clonidine  Labs today  If K+ OK increase lisinopril and BP  Follow K+ closely and low K+ diet  See NP 6 weeks

## 2023-08-16 NOTE — TELEPHONE ENCOUNTER
----- Message from Bernie Cueto MD sent at 8/16/2023  3:26 PM EDT -----  Tell pt. their labs are good. F/u in clinic as planned.       [facility]

## 2023-08-17 RX ORDER — LISINOPRIL 20 MG/1
20 TABLET ORAL DAILY
Qty: 90 TABLET | Refills: 0
Start: 2023-08-17

## 2023-08-17 NOTE — TELEPHONE ENCOUNTER
Jason URBAN- Yes he should increase Lisinopril to 20 mg daily. Spoke to patient.  He will get increased dose through Dr Marisol Gibson per his request.

## 2023-08-28 ENCOUNTER — HOSPITAL ENCOUNTER (OUTPATIENT)
Age: 78
Discharge: HOME | End: 2023-08-28
Payer: MEDICARE

## 2023-08-28 LAB — PSA SERPL DL<=0.01 NG/ML-MCNC: 8.18 NG/ML (ref 0–4)

## 2023-08-28 PROCEDURE — 84153 ASSAY OF PSA TOTAL: CPT

## 2023-08-28 PROCEDURE — 36415 COLL VENOUS BLD VENIPUNCTURE: CPT

## 2023-09-07 ENCOUNTER — TELEPHONE (OUTPATIENT)
Dept: CARDIOLOGY CLINIC | Age: 78
End: 2023-09-07

## 2023-09-07 NOTE — TELEPHONE ENCOUNTER
Dr Arron Sesay really does not have another option to replace it. Ask him to make sure the pharmacy is dispensing the generic.

## 2023-09-07 NOTE — TELEPHONE ENCOUNTER
Pt called to inform [facility] that the med Diltiazem 240mg is a little expensive, Pt is wanting to know if there's an alternative med that he can be prescribe. Please advise.   Thank you

## 2023-09-08 NOTE — TELEPHONE ENCOUNTER
Pt called back and stated the copay is $81 for Diltiazem with the GoodRx card. Pt is going to call the pharmacy back and see what it is again thru his insurance vs. Goodrx.

## 2023-09-12 ENCOUNTER — OFFICE VISIT (OUTPATIENT)
Dept: CARDIOLOGY CLINIC | Age: 78
End: 2023-09-12
Payer: MEDICARE

## 2023-09-12 VITALS
DIASTOLIC BLOOD PRESSURE: 62 MMHG | WEIGHT: 235 LBS | HEART RATE: 64 BPM | OXYGEN SATURATION: 95 % | SYSTOLIC BLOOD PRESSURE: 140 MMHG | BODY MASS INDEX: 37.77 KG/M2 | HEIGHT: 66 IN

## 2023-09-12 DIAGNOSIS — I10 PRIMARY HYPERTENSION: ICD-10-CM

## 2023-09-12 DIAGNOSIS — I48.0 PAROXYSMAL ATRIAL FIBRILLATION (HCC): Chronic | ICD-10-CM

## 2023-09-12 DIAGNOSIS — I25.10 ATHEROSCLEROSIS OF NATIVE CORONARY ARTERY OF NATIVE HEART WITHOUT ANGINA PECTORIS: Chronic | ICD-10-CM

## 2023-09-12 DIAGNOSIS — I50.32 CHRONIC DIASTOLIC HEART FAILURE (HCC): Primary | ICD-10-CM

## 2023-09-12 PROCEDURE — 3074F SYST BP LT 130 MM HG: CPT | Performed by: NURSE PRACTITIONER

## 2023-09-12 PROCEDURE — 3078F DIAST BP <80 MM HG: CPT | Performed by: NURSE PRACTITIONER

## 2023-09-12 PROCEDURE — 1036F TOBACCO NON-USER: CPT | Performed by: NURSE PRACTITIONER

## 2023-09-12 PROCEDURE — 1123F ACP DISCUSS/DSCN MKR DOCD: CPT | Performed by: NURSE PRACTITIONER

## 2023-09-12 PROCEDURE — 99214 OFFICE O/P EST MOD 30 MIN: CPT | Performed by: NURSE PRACTITIONER

## 2023-09-12 PROCEDURE — G8417 CALC BMI ABV UP PARAM F/U: HCPCS | Performed by: NURSE PRACTITIONER

## 2023-09-12 PROCEDURE — G8427 DOCREV CUR MEDS BY ELIG CLIN: HCPCS | Performed by: NURSE PRACTITIONER

## 2023-09-12 PROCEDURE — 3017F COLORECTAL CA SCREEN DOC REV: CPT | Performed by: NURSE PRACTITIONER

## 2023-09-12 RX ORDER — FUROSEMIDE 80 MG
80 TABLET ORAL DAILY
COMMUNITY

## 2023-09-12 NOTE — PROGRESS NOTES
lateral hypokinesis. Intermediate risk. Monitor 11/02/2022 to 12/01/2022   - no atrial fibrillation , high heart rate 137, avg heart rate 58, low heart rate 48,   2 episodes of NSVT longest fastest is 14 beats at 137 bpm  6 % PVC burden  4% PAC burden    Echo: Jan '22   Summary   -Normal left ventricle size, wall thickness, and systolic function with an estimated ejection fraction of 55-60%. -No regional wall motion abnormalities are seen.   -Aortic valve appears sclerotic but opens adequately. There is mild-to-moderate aortic insufficiency.   -Thickened mitral valve without evidence of stenosis. There is moderate mitral regurgitation.   -There is mild pulmonic regurgitation.   -Grade III diastolic dysfunction with elevated LV filling pressures. Avg. E/e'=14.7    Stress Test: June '20  Summary    There is a moderate sized mild intensity perfusion defect in the lateral    wall of the left ventricle consistent with ischemia. The LVEF is 54% with normal LV wall motion on gated imaging and TID was    normal at 0.98. The Sum difference score of 6 is abnormal.        This is an intermediate risk study. CARDIAC CATH 7/2020  LM-30%  LAD-prox 30%, mid 50%, competitive filling filling after mid vessel. LCX-occluded after OM1, Small caliber OM1 with 70% mid vessel. RCA-Occluded mid vessel  LV-mid anterior-lateral HK, EF=50%  LVEDP=10        LIMA=>LAD patent  SVG=>RCA patent  Radial=>OM Patent     IMP:  3 vessel CAD with patent grafts and mild LV dysfunction  Lateral ischemia seen on stress possibley due to OM1  Vessel is small and he has limited symptoms so will treat medically    Assessment:      Diagnosis Orders     1. Chronic diastolic heart failure (HCC)   ~stable : neg to exam for crackles  ~? Accuracy of wt trends and last recorded (? Difference in office scales)  ~life style with activities limited d/t back pain  ~lisinopril 20 mg daily / furosemide 40 mg daily  ~grade III DD      2.     Coronary

## 2023-11-02 ENCOUNTER — HOSPITAL ENCOUNTER (OUTPATIENT)
Age: 78
Discharge: HOME | End: 2023-11-02
Payer: MEDICARE

## 2023-11-02 LAB — PSA SERPL DL<=0.01 NG/ML-MCNC: 6.8 NG/ML (ref 0–4)

## 2023-11-02 PROCEDURE — 84153 ASSAY OF PSA TOTAL: CPT

## 2023-11-02 PROCEDURE — 36415 COLL VENOUS BLD VENIPUNCTURE: CPT

## 2023-11-24 ENCOUNTER — HOSPITAL ENCOUNTER (OUTPATIENT)
Age: 78
Discharge: HOME | End: 2023-11-24
Payer: MEDICARE

## 2023-11-24 DIAGNOSIS — I10 PRIMARY HYPERTENSION: ICD-10-CM

## 2023-11-24 LAB
ANION GAP SERPL CALCULATED.3IONS-SCNC: 7 MMOL/L (ref 3–16)
BUN SERPL-MCNC: 31 MG/DL (ref 7–20)
CALCIUM SERPL-MCNC: 8.8 MG/DL (ref 8.3–10.6)
CHLORIDE SERPL-SCNC: 102 MMOL/L (ref 99–110)
CO2 SERPL-SCNC: 30 MMOL/L (ref 21–32)
CREAT SERPL-MCNC: 1.3 MG/DL (ref 0.8–1.3)
GFR SERPLBLD CREATININE-BSD FMLA CKD-EPI: 57 ML/MIN/{1.73_M2}
GLUCOSE SERPL-MCNC: 130 MG/DL (ref 70–99)
POTASSIUM SERPL-SCNC: 4.5 MMOL/L (ref 3.5–5.1)
SODIUM SERPL-SCNC: 139 MMOL/L (ref 136–145)

## 2023-11-24 PROCEDURE — 80048 BASIC METABOLIC PNL TOTAL CA: CPT

## 2023-11-24 PROCEDURE — 36415 COLL VENOUS BLD VENIPUNCTURE: CPT

## 2023-11-27 ENCOUNTER — TELEPHONE (OUTPATIENT)
Dept: CARDIOLOGY CLINIC | Age: 78
End: 2023-11-27

## 2023-11-27 NOTE — TELEPHONE ENCOUNTER
----- Message from Amie Dash MD sent at 11/27/2023  2:47 PM EST -----  Tell pt. their labs are good. F/u in clinic as planned.       [facility]

## 2023-11-29 ENCOUNTER — OFFICE VISIT (OUTPATIENT)
Dept: CARDIOLOGY CLINIC | Age: 78
End: 2023-11-29
Payer: MEDICARE

## 2023-11-29 VITALS
BODY MASS INDEX: 37.93 KG/M2 | SYSTOLIC BLOOD PRESSURE: 112 MMHG | HEIGHT: 66 IN | DIASTOLIC BLOOD PRESSURE: 58 MMHG | OXYGEN SATURATION: 95 % | HEART RATE: 78 BPM | WEIGHT: 236 LBS

## 2023-11-29 DIAGNOSIS — I25.10 ATHEROSCLEROSIS OF NATIVE CORONARY ARTERY OF NATIVE HEART WITHOUT ANGINA PECTORIS: Primary | ICD-10-CM

## 2023-11-29 DIAGNOSIS — I50.32 CHRONIC DIASTOLIC HEART FAILURE (HCC): ICD-10-CM

## 2023-11-29 DIAGNOSIS — E87.5 HYPERKALEMIA: ICD-10-CM

## 2023-11-29 DIAGNOSIS — G47.33 OSA (OBSTRUCTIVE SLEEP APNEA): ICD-10-CM

## 2023-11-29 DIAGNOSIS — I48.0 PAROXYSMAL ATRIAL FIBRILLATION (HCC): ICD-10-CM

## 2023-11-29 DIAGNOSIS — R06.09 DYSPNEA ON EXERTION: ICD-10-CM

## 2023-11-29 DIAGNOSIS — I10 PRIMARY HYPERTENSION: ICD-10-CM

## 2023-11-29 DIAGNOSIS — E78.49 OTHER HYPERLIPIDEMIA: ICD-10-CM

## 2023-11-29 DIAGNOSIS — I34.0 NONRHEUMATIC MITRAL VALVE REGURGITATION: ICD-10-CM

## 2023-11-29 DIAGNOSIS — I10 ESSENTIAL HYPERTENSION, BENIGN: Chronic | ICD-10-CM

## 2023-11-29 PROCEDURE — 3074F SYST BP LT 130 MM HG: CPT | Performed by: INTERNAL MEDICINE

## 2023-11-29 PROCEDURE — G8427 DOCREV CUR MEDS BY ELIG CLIN: HCPCS | Performed by: INTERNAL MEDICINE

## 2023-11-29 PROCEDURE — G8417 CALC BMI ABV UP PARAM F/U: HCPCS | Performed by: INTERNAL MEDICINE

## 2023-11-29 PROCEDURE — 1036F TOBACCO NON-USER: CPT | Performed by: INTERNAL MEDICINE

## 2023-11-29 PROCEDURE — G8484 FLU IMMUNIZE NO ADMIN: HCPCS | Performed by: INTERNAL MEDICINE

## 2023-11-29 PROCEDURE — 99214 OFFICE O/P EST MOD 30 MIN: CPT | Performed by: INTERNAL MEDICINE

## 2023-11-29 PROCEDURE — 3078F DIAST BP <80 MM HG: CPT | Performed by: INTERNAL MEDICINE

## 2023-11-29 PROCEDURE — 1123F ACP DISCUSS/DSCN MKR DOCD: CPT | Performed by: INTERNAL MEDICINE

## 2023-11-29 RX ORDER — ATORVASTATIN CALCIUM 10 MG/1
TABLET, FILM COATED ORAL
Qty: 90 TABLET | Refills: 4 | Status: CANCELLED | OUTPATIENT
Start: 2023-11-29

## 2023-11-29 RX ORDER — HYDRALAZINE HYDROCHLORIDE 50 MG/1
50 TABLET, FILM COATED ORAL 2 TIMES DAILY
Qty: 180 TABLET | Refills: 4 | Status: CANCELLED | OUTPATIENT
Start: 2023-11-29

## 2023-11-29 NOTE — PROGRESS NOTES
[facility]  Cardiac Follow Up     Referring Provider:  Vance Kennedy MD     Chief Complaint   Patient presents with    Follow-up    Coronary Artery Disease    Hypertension    Hyperlipidemia        History of Present Illness:    Mr. Rockney Rinne is seen today for  follow up for management of CAD, hypertension, hyperlipidemia. He also has ODALYS uses Bipap. Foye Niall He is feeling somewhat better off of clonidine. Not falling asleep as much. Major complaints are pain and dyspnea on awaking. Denies dyspnea during the day or edema. \"I think it is my sleep apnea\"     Past Medical History: has a past medical history of Acute MI ([address]), Back pain, CAD (coronary artery disease), GERD (gastroesophageal reflux disease), Hyperlipidemia, Hypertension, and Sleep apnea. Surgical History: has a past surgical history that includes Cardiac surgery; back surgery; Hand surgery; Gallbladder surgery; Coronary artery bypass graft; cyst removal (N/A, 1970); and Prostate biopsy (2020). Social History: reports that he quit smoking about 23 years ago. His smoking use included cigarettes. He has never used smokeless tobacco. He reports that he does not drink alcohol and does not use drugs. Family History:family history includes Cancer in his sister; Heart Attack (age of onset: 72) in his father; Heart Disease in his father and mother; Kidney Disease in his mother. Home Medications:  Prior to Visit Medications    Medication Sig Taking?  Authorizing Provider   furosemide (LASIX) 80 MG tablet Take 1 tablet by mouth daily Yes Rj Celaya MD   lisinopril (PRINIVIL;ZESTRIL) 20 MG tablet Take 1 tablet by mouth daily Yes Catie Kern MD   Berberine Chloride (BERBERINE HCI) 500 MG CAPS Take by mouth Yes Rj Celaya MD   dilTIAZem (TIAZAC) 240 MG extended release capsule Take 1 capsule by mouth daily Yes Catie Kern MD   apixaban (ELIQUIS) 5 MG TABS tablet Take by mouth 2 times daily Yes Rj Celaya MD

## 2023-11-29 NOTE — PATIENT INSTRUCTIONS
Obtain labs prior to next appointment  Try to follow a cardiac diet, especially be cautious with sodium intake  Continue risk factor modifications. Call for any change in symptoms, call to report any changes in shortness of breath or development of chest pain with activity.     Follow up in 3-4 mos with Dr Osvaldo Johnson

## 2023-12-07 ENCOUNTER — TELEPHONE (OUTPATIENT)
Dept: CARDIOLOGY CLINIC | Age: 78
End: 2023-12-07

## 2023-12-13 ENCOUNTER — OFFICE VISIT (OUTPATIENT)
Dept: PULMONOLOGY | Age: 78
End: 2023-12-13
Payer: MEDICARE

## 2023-12-13 VITALS
DIASTOLIC BLOOD PRESSURE: 54 MMHG | OXYGEN SATURATION: 97 % | WEIGHT: 239 LBS | BODY MASS INDEX: 38.59 KG/M2 | HEART RATE: 83 BPM | SYSTOLIC BLOOD PRESSURE: 146 MMHG

## 2023-12-13 DIAGNOSIS — I50.32 CHRONIC DIASTOLIC HEART FAILURE (HCC): ICD-10-CM

## 2023-12-13 DIAGNOSIS — I25.10 ATHEROSCLEROSIS OF NATIVE CORONARY ARTERY OF NATIVE HEART WITHOUT ANGINA PECTORIS: ICD-10-CM

## 2023-12-13 DIAGNOSIS — I48.0 PAROXYSMAL ATRIAL FIBRILLATION (HCC): ICD-10-CM

## 2023-12-13 DIAGNOSIS — G47.33 OSA (OBSTRUCTIVE SLEEP APNEA): Primary | ICD-10-CM

## 2023-12-13 DIAGNOSIS — E66.01 CLASS 2 SEVERE OBESITY DUE TO EXCESS CALORIES WITH SERIOUS COMORBIDITY AND BODY MASS INDEX (BMI) OF 38.0 TO 38.9 IN ADULT (HCC): ICD-10-CM

## 2023-12-13 DIAGNOSIS — I10 ESSENTIAL HYPERTENSION, BENIGN: ICD-10-CM

## 2023-12-13 PROCEDURE — 3077F SYST BP >= 140 MM HG: CPT | Performed by: NURSE PRACTITIONER

## 2023-12-13 PROCEDURE — G8417 CALC BMI ABV UP PARAM F/U: HCPCS | Performed by: NURSE PRACTITIONER

## 2023-12-13 PROCEDURE — G8484 FLU IMMUNIZE NO ADMIN: HCPCS | Performed by: NURSE PRACTITIONER

## 2023-12-13 PROCEDURE — 99214 OFFICE O/P EST MOD 30 MIN: CPT | Performed by: NURSE PRACTITIONER

## 2023-12-13 PROCEDURE — 3078F DIAST BP <80 MM HG: CPT | Performed by: NURSE PRACTITIONER

## 2023-12-13 PROCEDURE — G8427 DOCREV CUR MEDS BY ELIG CLIN: HCPCS | Performed by: NURSE PRACTITIONER

## 2023-12-13 PROCEDURE — 1123F ACP DISCUSS/DSCN MKR DOCD: CPT | Performed by: NURSE PRACTITIONER

## 2023-12-13 PROCEDURE — 1036F TOBACCO NON-USER: CPT | Performed by: NURSE PRACTITIONER

## 2023-12-13 RX ORDER — CELECOXIB 200 MG/1
200 CAPSULE ORAL DAILY
COMMUNITY

## 2023-12-13 ASSESSMENT — SLEEP AND FATIGUE QUESTIONNAIRES
HOW LIKELY ARE YOU TO NOD OFF OR FALL ASLEEP WHILE WATCHING TV: 2
HOW LIKELY ARE YOU TO NOD OFF OR FALL ASLEEP WHILE SITTING INACTIVE IN A PUBLIC PLACE: 0
HOW LIKELY ARE YOU TO NOD OFF OR FALL ASLEEP WHILE SITTING AND TALKING TO SOMEONE: 0
ESS TOTAL SCORE: 7
HOW LIKELY ARE YOU TO NOD OFF OR FALL ASLEEP IN A CAR, WHILE STOPPED FOR A FEW MINUTES IN TRAFFIC: 0
HOW LIKELY ARE YOU TO NOD OFF OR FALL ASLEEP WHEN YOU ARE A PASSENGER IN A CAR FOR AN HOUR WITHOUT A BREAK: 0
HOW LIKELY ARE YOU TO NOD OFF OR FALL ASLEEP WHILE LYING DOWN TO REST IN THE AFTERNOON WHEN CIRCUMSTANCES PERMIT: 2
HOW LIKELY ARE YOU TO NOD OFF OR FALL ASLEEP WHILE SITTING AND READING: 1
HOW LIKELY ARE YOU TO NOD OFF OR FALL ASLEEP WHILE SITTING QUIETLY AFTER LUNCH WITHOUT ALCOHOL: 2

## 2023-12-13 NOTE — ASSESSMENT & PLAN NOTE
Chronic - Stable: Reviewed and analyzed results of physiologic download from patient's machine and reviewed with patients. Supplies and parts as needed for the machine. These are medically necessary. Limit caffeine use after 3 PM. Based on the analyzed data, we will continue with current settings. Has been doing much better after stop taking clonidine. Will continue with the current settings as he is stable. We will follow up in 3 mo. He was instructed to return sooner or contact the office if experiences new or worsening of symptoms. Encouraged the patient to continue to use his machine each night. Encouraged the patient to contact the office with any questions or concerns. Will send a prescription for supplies to [facility] based on his address. Contact information was provided to the patient.

## 2023-12-13 NOTE — PROGRESS NOTES
Support: 2          Mask: Full Face     Thad Jaquez presents today for follow up for sleep apnea. States he has been doing well with his machine. Clonidine was discontinued and feels like he has been sleeping better and feeling less fatigued/sleepy during the daytime. Pressure feels comfortable, and he is able to sleep through the night and is waking rested in the morning for the most part. States his mask is fitting well and comfortable with minimal leak. Denies headache, aerophagia, nosebleed, dry mouth, nasal congestion, and drowsiness while driving. He would like to switch to another DME as he is not happy with customer service from his current DME.     [facility]    [place] Sleepiness Score: 7    Social History     Socioeconomic History    Marital status:      Spouse name: Not on file    Number of children: Not on file    Years of education: Not on file    Highest education level: Not on file   Occupational History    Not on file   Tobacco Use    Smoking status: Former     Types: Cigarettes     Quit date: 2000     Years since quittin.8    Smokeless tobacco: Never   Vaping Use    Vaping Use: Never used   Substance and Sexual Activity    Alcohol use: No     Comment: 1 beer/yr    Drug use: No    Sexual activity: Yes   Other Topics Concern    Not on file   Social History Narrative    Not on file     Social Determinants of Health     Financial Resource Strain: Not on file   Food Insecurity: Not on file   Transportation Needs: Not on file   Physical Activity: Not on file   Stress: Not on file   Social Connections: Not on file   Intimate Partner Violence: Not on file   Housing Stability: Not on file       Current Outpatient Medications   Medication Instructions    ACETYLCARNITINE HCL PO Oral    apixaban (ELIQUIS) 5 MG TABS tablet Oral, 2 TIMES DAILY    atorvastatin (LIPITOR) 10 MG tablet TAKE 1 TABLET BY MOUTH ONE TIME A DAY     Berberine Chloride (BERBERINE HCI) 500 MG CAPS

## 2024-01-12 ENCOUNTER — TELEPHONE (OUTPATIENT)
Dept: CARDIOLOGY CLINIC | Age: 79
End: 2024-01-12

## 2024-01-12 NOTE — TELEPHONE ENCOUNTER
Spoke with patient no other symptoms. He stated B/P has been running high last three days. 170's-60's.

## 2024-01-12 NOTE — TELEPHONE ENCOUNTER
Called patient. He has not been following BP at home. The past couple days it has been in the 170's systolic. He has had more arthritic pain this week. He is wearing CPAP, staying away from any added salt to diet. No med changes. He did start Celebrex 3 months ago.    He has no chest pressure, shortness of breath, swelling, weight gain, etc.     Discussed when and how to check BP as he should follow it through the weekend and call us early next week with more numbers. If BP goes up and he has any symptoms needs ED evaluation immediately. He verbalized understanding.

## 2024-01-15 NOTE — TELEPHONE ENCOUNTER
Talked to patient about BP's over weekend. He reports his BP cuff giving him a lot of error readings. He is not sure if it is working correctly and it is old.  He did get a 160/63 this morning. He continues to have no symptoms.     Recommend he come in tomorrow for OV with NP and bring his cuff and readings. He wants appt in afternoon. Scheduled appt for Friday afternoon. Notified him if he develops any symptoms with elevated BP to call us and he would need to come in sooner.

## 2024-01-19 ENCOUNTER — OFFICE VISIT (OUTPATIENT)
Dept: CARDIOLOGY CLINIC | Age: 79
End: 2024-01-19
Payer: MEDICARE

## 2024-01-19 DIAGNOSIS — I48.0 PAF (PAROXYSMAL ATRIAL FIBRILLATION) (HCC): ICD-10-CM

## 2024-01-19 DIAGNOSIS — I10 ESSENTIAL HYPERTENSION, BENIGN: Chronic | ICD-10-CM

## 2024-01-19 DIAGNOSIS — I25.10 ATHEROSCLEROSIS OF NATIVE CORONARY ARTERY OF NATIVE HEART WITHOUT ANGINA PECTORIS: Chronic | ICD-10-CM

## 2024-01-19 DIAGNOSIS — I50.32 CHRONIC DIASTOLIC HEART FAILURE (HCC): ICD-10-CM

## 2024-01-19 DIAGNOSIS — I10 PRIMARY HYPERTENSION: Primary | ICD-10-CM

## 2024-01-19 PROCEDURE — 1036F TOBACCO NON-USER: CPT | Performed by: NURSE PRACTITIONER

## 2024-01-19 PROCEDURE — 3075F SYST BP GE 130 - 139MM HG: CPT | Performed by: NURSE PRACTITIONER

## 2024-01-19 PROCEDURE — G8417 CALC BMI ABV UP PARAM F/U: HCPCS | Performed by: NURSE PRACTITIONER

## 2024-01-19 PROCEDURE — 3078F DIAST BP <80 MM HG: CPT | Performed by: NURSE PRACTITIONER

## 2024-01-19 PROCEDURE — 1123F ACP DISCUSS/DSCN MKR DOCD: CPT | Performed by: NURSE PRACTITIONER

## 2024-01-19 PROCEDURE — 99214 OFFICE O/P EST MOD 30 MIN: CPT | Performed by: NURSE PRACTITIONER

## 2024-01-19 PROCEDURE — G8427 DOCREV CUR MEDS BY ELIG CLIN: HCPCS | Performed by: NURSE PRACTITIONER

## 2024-01-19 PROCEDURE — G8484 FLU IMMUNIZE NO ADMIN: HCPCS | Performed by: NURSE PRACTITIONER

## 2024-01-19 RX ORDER — HYDRALAZINE HYDROCHLORIDE 50 MG/1
50 TABLET, FILM COATED ORAL 3 TIMES DAILY
Qty: 270 TABLET | Refills: 1 | Status: SHIPPED | OUTPATIENT
Start: 2024-01-19

## 2024-01-19 NOTE — PATIENT INSTRUCTIONS
Increase hydralazine to 50 mg three times a day : covers BP better    Keep appt with Dr. Sandhu on 2/29

## 2024-01-19 NOTE — PROGRESS NOTES
[facility]     Outpatient Follow Up Note    Eric Wang is 78 y.o. male who presents today with a history of CAD s/p CABG '01 (Sammy Acuna) ; HTN, aortic regurg, PAF and hyperlipidemia.      CHIEF COMPLAINT / HPI:  Follow Up secondary to HTN calling on  with a /63    Subjective:     Over the past week his BP is up: 146/75 - 162/63    He can't do much d/t back and leg discomfort. He's gained another 2# and disappointed. He wants Mounjaro.    He denies chest discomfort. He c/o SOB when going up steps or walking too far. He can't walk long before his leg and back hurts ongoing for 37 yrs. He denies orthopnea/PND.  He wears his CPAP nightly. He denies swelling in his legs.     He denies palpitations.    These symptoms  are stable since the last OV .  With regard to medication therapy the patient has been compliant with prescribed regimen. They have tolerated therapy to date.     Past Medical History:   Diagnosis Date    Acute MI (HCC)     Back pain     CAD (coronary artery disease)     GERD (gastroesophageal reflux disease)     Hyperlipidemia     Hypertension     Sleep apnea      Social History:    Social History     Tobacco Use   Smoking Status Former    Current packs/day: 0.00    Types: Cigarettes    Quit date: 2000    Years since quittin.9   Smokeless Tobacco Never     Current Medications:  Current Outpatient Medications   Medication Sig Dispense Refill    celecoxib (CELEBREX) 200 MG capsule Take 1 capsule by mouth daily      furosemide (LASIX) 80 MG tablet Take 1 tablet by mouth daily      lisinopril (PRINIVIL;ZESTRIL) 20 MG tablet Take 1 tablet by mouth daily 90 tablet 0    Berberine Chloride (BERBERINE HCI) 500 MG CAPS Take by mouth      dilTIAZem (TIAZAC) 240 MG extended release capsule Take 1 capsule by mouth daily 90 capsule 4    apixaban (ELIQUIS) 5 MG TABS tablet Take by mouth 2 times daily      atorvastatin (LIPITOR) 10 MG tablet TAKE 1 TABLET BY MOUTH ONE TIME A DAY 90

## 2024-01-23 ENCOUNTER — TELEPHONE (OUTPATIENT)
Dept: CARDIOLOGY CLINIC | Age: 79
End: 2024-01-23

## 2024-01-23 NOTE — TELEPHONE ENCOUNTER
NPTS increased Hydralazine to TID at OV 1/19/24. Eric states the dosage increase made him feel \"blah\" so he decreased back to BID yesterday. He has a new blood pressure machine ordered, but does not have it yet. B/p was running 150's-160's. He is going to a  appt and stated I can leave a message when calling back.     Per Dr Sandhu> continue Hydralazine BID, monitor b/p. I called Eric and relayed instructions to him. He verbalized understanding

## 2024-01-23 NOTE — TELEPHONE ENCOUNTER
Pt states he is not feeling good sense changing hydralazine dosage. Pt states he changed med to 2x a day as of today. Pt has appt today @ 3:30 if you are not able to reach him. Please call to advise.

## 2024-01-24 VITALS
BODY MASS INDEX: 38.25 KG/M2 | HEART RATE: 61 BPM | SYSTOLIC BLOOD PRESSURE: 137 MMHG | WEIGHT: 238 LBS | OXYGEN SATURATION: 96 % | HEIGHT: 66 IN | DIASTOLIC BLOOD PRESSURE: 79 MMHG

## 2024-01-31 NOTE — PROGRESS NOTES
HR >100 is likely indicative of recurrent AF    2. NSVT   - Noted on prior monitor    3. HTN  - Stable  ~ Goal <130/80  - Continue current medications  - Encouraged to monitor and log BP readings at home, then bring log to next visit  - Discussed importance of low sodium diet, weight control and exercise    4.CAD  - Hx of CABG (2001)  - Stable  - No complaints of angina  - Continue ACEI, BB, and statin    5.Chronic diastolic heart failure (NYHA Class II)  - Appears compensated   ~ EF 55% per echo  - Continue with BB, ACE, lasix  - Aggressive medical therapy with risk factor modification  - Discussed with patient importance of monitoring weight, low sodium diet and fluid restriction    6.ODALYS  - Stable: Uses CPAP  - Encourage to use CPAP to prevent long term effects of untreated ODALYS    7.Obesity  - Body mass index is 37.79 kg/m².   - Complicating assessment and treatment. Placing patient at risk for multiple co-morbidities as well as early death and contributing to the patient's presentation  - Discussed weight loss and patient was encouraged to reduce calorie intake and increase exercise    Plan:  1.No changes  2. Call office if any issues    F/U: Follow-up with EP in 1 year  -Call [facility] at [phone] with any questions    Diet & Exercise:  The patient is counseled to follow a low salt diet to assure blood pressure remains controlled for cardiovascular risk factor modification  The patient is counseled to avoid excess caffeine, and energy drinks as this may exacerbated ectopy and arrhythmia  The patient is counseled to lose weight to control cardiovascular risk factors  Exercise program discussed: To improve overall cardiovascular health, the patient is instructed to increase cardiovascular related activities with a goal of 150 min/week of moderate level activity or 10,000 steps per day. Encouraged to perform as much activity as tolerated    I have addressed the patient's cardiac risk factors and

## 2024-02-29 ENCOUNTER — OFFICE VISIT (OUTPATIENT)
Dept: CARDIOLOGY CLINIC | Age: 79
End: 2024-02-29
Payer: MEDICARE

## 2024-02-29 VITALS
SYSTOLIC BLOOD PRESSURE: 136 MMHG | HEIGHT: 66 IN | BODY MASS INDEX: 37.61 KG/M2 | WEIGHT: 234 LBS | DIASTOLIC BLOOD PRESSURE: 62 MMHG | OXYGEN SATURATION: 92 % | HEART RATE: 72 BPM

## 2024-02-29 DIAGNOSIS — I25.10 ATHEROSCLEROSIS OF NATIVE CORONARY ARTERY OF NATIVE HEART WITHOUT ANGINA PECTORIS: Chronic | ICD-10-CM

## 2024-02-29 DIAGNOSIS — E78.2 MIXED HYPERLIPIDEMIA: Chronic | ICD-10-CM

## 2024-02-29 DIAGNOSIS — I34.0 NONRHEUMATIC MITRAL VALVE REGURGITATION: ICD-10-CM

## 2024-02-29 DIAGNOSIS — I10 ESSENTIAL HYPERTENSION, BENIGN: Primary | Chronic | ICD-10-CM

## 2024-02-29 DIAGNOSIS — I50.32 CHRONIC DIASTOLIC HEART FAILURE (HCC): ICD-10-CM

## 2024-02-29 DIAGNOSIS — G47.33 OSA (OBSTRUCTIVE SLEEP APNEA): Chronic | ICD-10-CM

## 2024-02-29 DIAGNOSIS — I48.0 PAROXYSMAL ATRIAL FIBRILLATION (HCC): Chronic | ICD-10-CM

## 2024-02-29 PROCEDURE — 3075F SYST BP GE 130 - 139MM HG: CPT | Performed by: INTERNAL MEDICINE

## 2024-02-29 PROCEDURE — 93000 ELECTROCARDIOGRAM COMPLETE: CPT | Performed by: INTERNAL MEDICINE

## 2024-02-29 PROCEDURE — 1123F ACP DISCUSS/DSCN MKR DOCD: CPT | Performed by: INTERNAL MEDICINE

## 2024-02-29 PROCEDURE — G8484 FLU IMMUNIZE NO ADMIN: HCPCS | Performed by: INTERNAL MEDICINE

## 2024-02-29 PROCEDURE — 99214 OFFICE O/P EST MOD 30 MIN: CPT | Performed by: INTERNAL MEDICINE

## 2024-02-29 PROCEDURE — 3078F DIAST BP <80 MM HG: CPT | Performed by: INTERNAL MEDICINE

## 2024-02-29 PROCEDURE — G8417 CALC BMI ABV UP PARAM F/U: HCPCS | Performed by: INTERNAL MEDICINE

## 2024-02-29 PROCEDURE — 1036F TOBACCO NON-USER: CPT | Performed by: INTERNAL MEDICINE

## 2024-02-29 PROCEDURE — G8427 DOCREV CUR MEDS BY ELIG CLIN: HCPCS | Performed by: INTERNAL MEDICINE

## 2024-02-29 NOTE — PROGRESS NOTES
[facility]  Cardiac Follow Up     Referring Provider:  Kimo Francois MD     Chief Complaint   Patient presents with    Hypertension     4 month f/u     Hyperlipidemia    Coronary Artery Disease    Atrial Fibrillation        History of Present Illness:    Mr. Wang is seen today for  follow up for management of CAD, hypertension, hyperlipidemia.  He also has ODALYS uses Bipap..     He seems to be doing better off of clonidine. Still falls asleep daily. BP improved. No chest pain.    Past Medical History: has a past medical history of Acute MI (HCC), Back pain, CAD (coronary artery disease), GERD (gastroesophageal reflux disease), Hyperlipidemia, Hypertension, and Sleep apnea.  Surgical History: has a past surgical history that includes Cardiac surgery; back surgery; Hand surgery; Gallbladder surgery; Coronary artery bypass graft; cyst removal (N/A, 1970); and Prostate biopsy (2020).   Social History: reports that he quit smoking about 24 years ago. His smoking use included cigarettes. He has never been exposed to tobacco smoke. He has never used smokeless tobacco. He reports that he does not drink alcohol and does not use drugs.   Family History:family history includes Cancer in his sister; Heart Attack (age of onset: 65) in his father; Heart Disease in his father and mother; Kidney Disease in his mother.   Home Medications:  Prior to Visit Medications    Medication Sig Taking? Authorizing Provider   hydrALAZINE (APRESOLINE) 50 MG tablet Take 1 tablet by mouth 3 times daily Yes Brooke Hardwick APRN - CNP   celecoxib (CELEBREX) 200 MG capsule Take 1 capsule by mouth daily Yes jR Celaya MD   furosemide (LASIX) 80 MG tablet Take 1 tablet by mouth daily Yes Rj Celaya MD   lisinopril (PRINIVIL;ZESTRIL) 20 MG tablet Take 1 tablet by mouth daily Yes Andres Sandhu MD   Berberine Chloride (BERBERINE HCI) 500 MG CAPS Take by mouth Yes Rj Celaya MD   dilTIAZem (TIAZAC)

## 2024-03-01 ENCOUNTER — OFFICE VISIT (OUTPATIENT)
Dept: CARDIOLOGY CLINIC | Age: 79
End: 2024-03-01
Payer: MEDICARE

## 2024-03-01 VITALS
BODY MASS INDEX: 37.61 KG/M2 | WEIGHT: 234 LBS | HEART RATE: 87 BPM | SYSTOLIC BLOOD PRESSURE: 128 MMHG | HEIGHT: 66 IN | DIASTOLIC BLOOD PRESSURE: 60 MMHG | OXYGEN SATURATION: 94 %

## 2024-03-01 DIAGNOSIS — I34.0 NONRHEUMATIC MITRAL VALVE REGURGITATION: ICD-10-CM

## 2024-03-01 DIAGNOSIS — I25.10 ATHEROSCLEROSIS OF NATIVE CORONARY ARTERY OF NATIVE HEART WITHOUT ANGINA PECTORIS: Chronic | ICD-10-CM

## 2024-03-01 DIAGNOSIS — I10 ESSENTIAL HYPERTENSION, BENIGN: Chronic | ICD-10-CM

## 2024-03-01 DIAGNOSIS — I50.32 CHRONIC DIASTOLIC HEART FAILURE (HCC): ICD-10-CM

## 2024-03-01 DIAGNOSIS — R00.1 BRADYCARDIA: ICD-10-CM

## 2024-03-01 DIAGNOSIS — I48.0 PAROXYSMAL ATRIAL FIBRILLATION (HCC): Primary | Chronic | ICD-10-CM

## 2024-03-01 PROCEDURE — G8427 DOCREV CUR MEDS BY ELIG CLIN: HCPCS | Performed by: NURSE PRACTITIONER

## 2024-03-01 PROCEDURE — G8417 CALC BMI ABV UP PARAM F/U: HCPCS | Performed by: NURSE PRACTITIONER

## 2024-03-01 PROCEDURE — 93000 ELECTROCARDIOGRAM COMPLETE: CPT | Performed by: NURSE PRACTITIONER

## 2024-03-01 PROCEDURE — 1123F ACP DISCUSS/DSCN MKR DOCD: CPT | Performed by: NURSE PRACTITIONER

## 2024-03-01 PROCEDURE — 3078F DIAST BP <80 MM HG: CPT | Performed by: NURSE PRACTITIONER

## 2024-03-01 PROCEDURE — 1036F TOBACCO NON-USER: CPT | Performed by: NURSE PRACTITIONER

## 2024-03-01 PROCEDURE — 99214 OFFICE O/P EST MOD 30 MIN: CPT | Performed by: NURSE PRACTITIONER

## 2024-03-01 PROCEDURE — 3074F SYST BP LT 130 MM HG: CPT | Performed by: NURSE PRACTITIONER

## 2024-03-01 PROCEDURE — G8484 FLU IMMUNIZE NO ADMIN: HCPCS | Performed by: NURSE PRACTITIONER

## 2024-03-18 ENCOUNTER — OFFICE VISIT (OUTPATIENT)
Dept: PULMONOLOGY | Age: 79
End: 2024-03-18
Payer: MEDICARE

## 2024-03-18 VITALS
OXYGEN SATURATION: 97 % | HEART RATE: 84 BPM | WEIGHT: 233 LBS | SYSTOLIC BLOOD PRESSURE: 138 MMHG | BODY MASS INDEX: 37.63 KG/M2 | DIASTOLIC BLOOD PRESSURE: 64 MMHG

## 2024-03-18 DIAGNOSIS — E66.01 CLASS 2 SEVERE OBESITY DUE TO EXCESS CALORIES WITH SERIOUS COMORBIDITY AND BODY MASS INDEX (BMI) OF 37.0 TO 37.9 IN ADULT (HCC): ICD-10-CM

## 2024-03-18 DIAGNOSIS — I48.0 PAROXYSMAL ATRIAL FIBRILLATION (HCC): ICD-10-CM

## 2024-03-18 DIAGNOSIS — I25.10 ATHEROSCLEROSIS OF NATIVE CORONARY ARTERY OF NATIVE HEART WITHOUT ANGINA PECTORIS: ICD-10-CM

## 2024-03-18 DIAGNOSIS — G47.33 OSA (OBSTRUCTIVE SLEEP APNEA): Primary | ICD-10-CM

## 2024-03-18 DIAGNOSIS — I10 ESSENTIAL HYPERTENSION, BENIGN: ICD-10-CM

## 2024-03-18 DIAGNOSIS — I50.32 CHRONIC DIASTOLIC HEART FAILURE (HCC): ICD-10-CM

## 2024-03-18 PROBLEM — E66.812 CLASS 2 SEVERE OBESITY DUE TO EXCESS CALORIES WITH SERIOUS COMORBIDITY AND BODY MASS INDEX (BMI) OF 37.0 TO 37.9 IN ADULT: Status: ACTIVE | Noted: 2021-08-10

## 2024-03-18 PROCEDURE — 3078F DIAST BP <80 MM HG: CPT | Performed by: NURSE PRACTITIONER

## 2024-03-18 PROCEDURE — 1036F TOBACCO NON-USER: CPT | Performed by: NURSE PRACTITIONER

## 2024-03-18 PROCEDURE — G8484 FLU IMMUNIZE NO ADMIN: HCPCS | Performed by: NURSE PRACTITIONER

## 2024-03-18 PROCEDURE — 1123F ACP DISCUSS/DSCN MKR DOCD: CPT | Performed by: NURSE PRACTITIONER

## 2024-03-18 PROCEDURE — G8417 CALC BMI ABV UP PARAM F/U: HCPCS | Performed by: NURSE PRACTITIONER

## 2024-03-18 PROCEDURE — G8427 DOCREV CUR MEDS BY ELIG CLIN: HCPCS | Performed by: NURSE PRACTITIONER

## 2024-03-18 PROCEDURE — 3075F SYST BP GE 130 - 139MM HG: CPT | Performed by: NURSE PRACTITIONER

## 2024-03-18 PROCEDURE — 99214 OFFICE O/P EST MOD 30 MIN: CPT | Performed by: NURSE PRACTITIONER

## 2024-03-18 ASSESSMENT — SLEEP AND FATIGUE QUESTIONNAIRES
ESS TOTAL SCORE: 13
HOW LIKELY ARE YOU TO NOD OFF OR FALL ASLEEP WHILE SITTING INACTIVE IN A PUBLIC PLACE: MODERATE CHANCE OF DOZING
HOW LIKELY ARE YOU TO NOD OFF OR FALL ASLEEP WHILE LYING DOWN TO REST IN THE AFTERNOON WHEN CIRCUMSTANCES PERMIT: HIGH CHANCE OF DOZING
HOW LIKELY ARE YOU TO NOD OFF OR FALL ASLEEP WHEN YOU ARE A PASSENGER IN A CAR FOR AN HOUR WITHOUT A BREAK: SLIGHT CHANCE OF DOZING
HOW LIKELY ARE YOU TO NOD OFF OR FALL ASLEEP WHILE SITTING AND READING: MODERATE CHANCE OF DOZING
HOW LIKELY ARE YOU TO NOD OFF OR FALL ASLEEP WHILE SITTING AND TALKING TO SOMEONE: SLIGHT CHANCE OF DOZING
HOW LIKELY ARE YOU TO NOD OFF OR FALL ASLEEP IN A CAR, WHILE STOPPED FOR A FEW MINUTES IN TRAFFIC: WOULD NEVER DOZE
HOW LIKELY ARE YOU TO NOD OFF OR FALL ASLEEP WHILE WATCHING TV: MODERATE CHANCE OF DOZING
HOW LIKELY ARE YOU TO NOD OFF OR FALL ASLEEP WHILE SITTING QUIETLY AFTER LUNCH WITHOUT ALCOHOL: MODERATE CHANCE OF DOZING

## 2024-03-18 NOTE — PROGRESS NOTES
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) [] CSA (G47.31) [] Apnea (G47.30)   Length of Need: [x] 18 Months [] 99 Months [] Other:   Machine (LAYO!): [] Respironics Dream Station   2   Auto [] ResMed AirSense     Auto 11 [] Other:     []  CPAP () [] Bilevel ()   Mode: [] Auto [] Spontaneous    Mode: [] Auto [] Spontaneous             Comfort Settings:      Humidifier: [] Heated ()        [x] Water chamber replacement ()/ 1 per 6 months        Mask:   [] Nasal () /1 per 3 months [x] Full Face () /1 per 3 months   [] Patient choice -Size and fit mask [x] Patient Choice - Size and fit mask   [] Dispense: [] Dispense:   [] Headgear () / 1 per 3 months [x] Headgear () / 1 per 3 months   [] Replacement Nasal Cushion ()/2 per month [x] Interface Replacement ()/1 per month   [] Replacement Nasal Pillows ()/2 per month         Tubing: [x] Heated ()/1 per 3 months    [] Standard ()/1 per 3 months [] Other:           Filters: [x] Non-disposable ()/1 per 6 months     [x] Ultra-Fine, Disposable ()/2 per month        Miscellaneous: [] Chin Strap ()/ 1 per 6 months [] O2 bleed-in:        LPM   [] Oxymetry on CPAP/Bilevel [x]  Other: Modem: ()         Start Order Date: 24    MEDICAL JUSTIFICATION:  I, the undersigned, certify that the above prescribed supplies are medically necessary for this patient’s wellbeing.  In my opinion, the supplies are both reasonable and necessary in reference to accepted standards of medicalpractice in treatment of this patient’s condition.    Shauna Zaidi CNP    NPI: [id number]     Order Signed Date: 24    Eric Wang  1945  [address] [place] [address]  [phone] (home)   [phone] (mobile)      Insurance Info (confirm with patient if correct):  Payer/Plan Subscr  Sex Relation Sub. Ins. ID Effective Group Num

## 2024-03-18 NOTE — ASSESSMENT & PLAN NOTE
Chronic - Stable: Reviewed and analyzed results of physiologic download from patient's machine and reviewed with patients. Supplies and parts as needed for the machine. These are medically necessary. Limit caffeine use after 3 PM. Based on the analyzed data, we will continue with current settings. At this time he needs to take the machine to DME for further evaluation to find out why the machine is making loud noise. The download shows almost none to very minimum mask leak, and he states the noise is coming from the machine, not the mask. Contact information for other DME in this area were provided. He would like to try [facility]. He was instructed to contact them so they can take a look at the machine for further evaluation. Will monitor his AHI and symptoms. His AHI is slightly elevated but he is clinically stable at this time. Discussed the recent urgent field safety notification for masks with magnets. He will return in 3 mo for follow up. Instructed him to return sooner or contact the office if experiences new or worsening of symptoms. Encouraged him to continue to use his machine each night. Encouraged the patient to contact the office with any questions or concerns.

## 2024-03-18 NOTE — PROGRESS NOTES
Pk Prajapati CNP  Shauna Zaidi CNP Antoine  [address]  [address]  [place] [address]  P- [phone]  F- [phone]     [facility]  [facility]  [address]  [address]  [facility] [address]  Dept: [phone]  Dept Fax: [phone]  Loc: [phone]      Assessment/Plan:      1. ODALYS (obstructive sleep apnea)  Assessment & Plan:  Chronic - Stable: Reviewed and analyzed results of physiologic download from patient's machine and reviewed with patients. Supplies and parts as needed for the machine. These are medically necessary. Limit caffeine use after 3 PM. Based on the analyzed data, we will continue with current settings. At this time he needs to take the machine to DME for further evaluation to find out why the machine is making loud noise. The download shows almost none to very minimum mask leak, and he states the noise is coming from the machine, not the mask. Contact information for other DME in this area were provided. He would like to try [facility]. He was instructed to contact them so they can take a look at the machine for further evaluation. Will monitor his AHI and symptoms. His AHI is slightly elevated but he is clinically stable at this time. Discussed the recent urgent field safety notification for masks with magnets. He will return in 3 mo for follow up. Instructed him to return sooner or contact the office if experiences new or worsening of symptoms. Encouraged him to continue to use his machine each night. Encouraged the patient to contact the office with any questions or concerns.  2. Atherosclerosis of native coronary artery of native heart without angina pectoris  Assessment & Plan:   Chronic- Stable.  Discussed the importance of treating sleep apnea as part of the management of this disorder.  Cont any meds per PCP and other physicians.    3. Chronic diastolic heart failure (HCC)  Assessment

## 2024-04-24 ENCOUNTER — APPOINTMENT (OUTPATIENT)
Dept: GENERAL RADIOLOGY | Age: 79
End: 2024-04-24
Payer: MEDICARE

## 2024-04-24 ENCOUNTER — APPOINTMENT (OUTPATIENT)
Dept: CT IMAGING | Age: 79
End: 2024-04-24
Payer: MEDICARE

## 2024-04-24 ENCOUNTER — HOSPITAL ENCOUNTER (INPATIENT)
Age: 79
LOS: 1 days | Discharge: LEFT BEFORE DISCHARGE | End: 2024-04-25
Attending: HOSPITALIST | Admitting: HOSPITALIST
Payer: MEDICARE

## 2024-04-24 DIAGNOSIS — I10 ESSENTIAL HYPERTENSION, BENIGN: Chronic | ICD-10-CM

## 2024-04-24 DIAGNOSIS — R53.1 GENERAL WEAKNESS: ICD-10-CM

## 2024-04-24 DIAGNOSIS — R79.89 ELEVATED TROPONIN: ICD-10-CM

## 2024-04-24 DIAGNOSIS — R11.2 NAUSEA AND VOMITING, UNSPECIFIED VOMITING TYPE: Primary | ICD-10-CM

## 2024-04-24 LAB
ALBUMIN SERPL-MCNC: 4.2 G/DL (ref 3.4–5)
ALBUMIN/GLOB SERPL: 1.4 {RATIO} (ref 1.1–2.2)
ALP SERPL-CCNC: 66 U/L (ref 40–129)
ALT SERPL-CCNC: 23 U/L (ref 10–40)
ANION GAP SERPL CALCULATED.3IONS-SCNC: 15 MMOL/L (ref 3–16)
AST SERPL-CCNC: 52 U/L (ref 15–37)
BACTERIA URNS QL MICRO: ABNORMAL /HPF
BASOPHILS # BLD: 0 K/UL (ref 0–0.2)
BASOPHILS NFR BLD: 0.4 %
BILIRUB SERPL-MCNC: 0.7 MG/DL (ref 0–1)
BILIRUB UR QL STRIP.AUTO: NEGATIVE
BUN SERPL-MCNC: 32 MG/DL (ref 7–20)
CALCIUM SERPL-MCNC: 9 MG/DL (ref 8.3–10.6)
CHLORIDE SERPL-SCNC: 101 MMOL/L (ref 99–110)
CLARITY UR: CLEAR
CO2 SERPL-SCNC: 21 MMOL/L (ref 21–32)
COLOR UR: ABNORMAL
CREAT SERPL-MCNC: 1.5 MG/DL (ref 0.8–1.3)
DEPRECATED RDW RBC AUTO: 13.4 % (ref 12.4–15.4)
EOSINOPHIL # BLD: 0 K/UL (ref 0–0.6)
EOSINOPHIL NFR BLD: 0.4 %
EPI CELLS #/AREA URNS AUTO: 1 /HPF (ref 0–5)
GFR SERPLBLD CREATININE-BSD FMLA CKD-EPI: 48 ML/MIN/{1.73_M2}
GLUCOSE SERPL-MCNC: 192 MG/DL (ref 70–99)
GLUCOSE UR STRIP.AUTO-MCNC: NEGATIVE MG/DL
HCT VFR BLD AUTO: 45.6 % (ref 40.5–52.5)
HGB BLD-MCNC: 14.7 G/DL (ref 13.5–17.5)
HGB UR QL STRIP.AUTO: NEGATIVE
HYALINE CASTS #/AREA URNS AUTO: 10 /LPF (ref 0–8)
HYALINE CASTS: PRESENT
KETONES UR STRIP.AUTO-MCNC: NEGATIVE MG/DL
LEUKOCYTE ESTERASE UR QL STRIP.AUTO: NEGATIVE
LIPASE SERPL-CCNC: 44 U/L (ref 13–60)
LYMPHOCYTES # BLD: 1 K/UL (ref 1–5.1)
LYMPHOCYTES NFR BLD: 9.7 %
MCH RBC QN AUTO: 30.3 PG (ref 26–34)
MCHC RBC AUTO-ENTMCNC: 32.4 G/DL (ref 31–36)
MCV RBC AUTO: 93.6 FL (ref 80–100)
MONOCYTES # BLD: 0.8 K/UL (ref 0–1.3)
MONOCYTES NFR BLD: 7.7 %
NEUTROPHILS # BLD: 8.4 K/UL (ref 1.7–7.7)
NEUTROPHILS NFR BLD: 81.8 %
NITRITE UR QL STRIP.AUTO: NEGATIVE
PH UR STRIP.AUTO: 5.5 [PH] (ref 5–8)
PLATELET # BLD AUTO: 185 K/UL (ref 135–450)
PMV BLD AUTO: 9.1 FL (ref 5–10.5)
POTASSIUM SERPL-SCNC: 4.4 MMOL/L (ref 3.5–5.1)
PROT SERPL-MCNC: 7.2 G/DL (ref 6.4–8.2)
PROT UR STRIP.AUTO-MCNC: 30 MG/DL
RBC # BLD AUTO: 4.87 M/UL (ref 4.2–5.9)
RBC CLUMPS #/AREA URNS AUTO: 1 /HPF (ref 0–4)
SODIUM SERPL-SCNC: 137 MMOL/L (ref 136–145)
SP GR UR STRIP.AUTO: >=1.03 (ref 1–1.03)
TROPONIN, HIGH SENSITIVITY: 40 NG/L (ref 0–22)
TROPONIN, HIGH SENSITIVITY: 60 NG/L (ref 0–22)
UA COMPLETE W REFLEX CULTURE PNL UR: ABNORMAL
UA DIPSTICK W REFLEX MICRO PNL UR: YES
URN SPEC COLLECT METH UR: ABNORMAL
UROBILINOGEN UR STRIP-ACNC: 1 E.U./DL
WBC # BLD AUTO: 10.2 K/UL (ref 4–11)
WBC #/AREA URNS AUTO: 5 /HPF (ref 0–5)

## 2024-04-24 PROCEDURE — 84484 ASSAY OF TROPONIN QUANT: CPT

## 2024-04-24 PROCEDURE — 96375 TX/PRO/DX INJ NEW DRUG ADDON: CPT

## 2024-04-24 PROCEDURE — 83690 ASSAY OF LIPASE: CPT

## 2024-04-24 PROCEDURE — 6360000002 HC RX W HCPCS: Performed by: PHYSICIAN ASSISTANT

## 2024-04-24 PROCEDURE — 71045 X-RAY EXAM CHEST 1 VIEW: CPT

## 2024-04-24 PROCEDURE — 96374 THER/PROPH/DIAG INJ IV PUSH: CPT

## 2024-04-24 PROCEDURE — 6370000000 HC RX 637 (ALT 250 FOR IP): Performed by: PHYSICIAN ASSISTANT

## 2024-04-24 PROCEDURE — 2580000003 HC RX 258: Performed by: PHYSICIAN ASSISTANT

## 2024-04-24 PROCEDURE — 6360000004 HC RX CONTRAST MEDICATION: Performed by: PHYSICIAN ASSISTANT

## 2024-04-24 PROCEDURE — 99285 EMERGENCY DEPT VISIT HI MDM: CPT

## 2024-04-24 PROCEDURE — 85025 COMPLETE CBC W/AUTO DIFF WBC: CPT

## 2024-04-24 PROCEDURE — 74177 CT ABD & PELVIS W/CONTRAST: CPT

## 2024-04-24 PROCEDURE — 80053 COMPREHEN METABOLIC PANEL: CPT

## 2024-04-24 PROCEDURE — 96361 HYDRATE IV INFUSION ADD-ON: CPT

## 2024-04-24 PROCEDURE — 1200000000 HC SEMI PRIVATE

## 2024-04-24 PROCEDURE — 81001 URINALYSIS AUTO W/SCOPE: CPT

## 2024-04-24 PROCEDURE — 93005 ELECTROCARDIOGRAM TRACING: CPT | Performed by: PHYSICIAN ASSISTANT

## 2024-04-24 RX ORDER — 0.9 % SODIUM CHLORIDE 0.9 %
500 INTRAVENOUS SOLUTION INTRAVENOUS ONCE
Status: COMPLETED | OUTPATIENT
Start: 2024-04-24 | End: 2024-04-24

## 2024-04-24 RX ORDER — SODIUM CHLORIDE 0.9 % (FLUSH) 0.9 %
5-40 SYRINGE (ML) INJECTION EVERY 12 HOURS SCHEDULED
Status: DISCONTINUED | OUTPATIENT
Start: 2024-04-24 | End: 2024-04-25 | Stop reason: HOSPADM

## 2024-04-24 RX ORDER — SODIUM CHLORIDE 0.9 % (FLUSH) 0.9 %
5-40 SYRINGE (ML) INJECTION PRN
Status: DISCONTINUED | OUTPATIENT
Start: 2024-04-24 | End: 2024-04-25 | Stop reason: HOSPADM

## 2024-04-24 RX ORDER — HYDRALAZINE HYDROCHLORIDE 25 MG/1
50 TABLET, FILM COATED ORAL 2 TIMES DAILY
Status: DISCONTINUED | OUTPATIENT
Start: 2024-04-24 | End: 2024-04-25 | Stop reason: HOSPADM

## 2024-04-24 RX ORDER — POLYETHYLENE GLYCOL 3350 17 G/17G
17 POWDER, FOR SOLUTION ORAL DAILY PRN
Status: DISCONTINUED | OUTPATIENT
Start: 2024-04-24 | End: 2024-04-25 | Stop reason: HOSPADM

## 2024-04-24 RX ORDER — MAGNESIUM SULFATE IN WATER 40 MG/ML
2000 INJECTION, SOLUTION INTRAVENOUS PRN
Status: DISCONTINUED | OUTPATIENT
Start: 2024-04-24 | End: 2024-04-25 | Stop reason: HOSPADM

## 2024-04-24 RX ORDER — ACETAMINOPHEN 650 MG/1
650 SUPPOSITORY RECTAL EVERY 6 HOURS PRN
Status: DISCONTINUED | OUTPATIENT
Start: 2024-04-24 | End: 2024-04-25 | Stop reason: HOSPADM

## 2024-04-24 RX ORDER — ACETAMINOPHEN 325 MG/1
650 TABLET ORAL EVERY 6 HOURS PRN
Status: DISCONTINUED | OUTPATIENT
Start: 2024-04-24 | End: 2024-04-25 | Stop reason: HOSPADM

## 2024-04-24 RX ORDER — CETIRIZINE HYDROCHLORIDE 10 MG/1
10 TABLET ORAL DAILY
Status: DISCONTINUED | OUTPATIENT
Start: 2024-04-25 | End: 2024-04-25 | Stop reason: HOSPADM

## 2024-04-24 RX ORDER — DILTIAZEM HYDROCHLORIDE 120 MG/1
240 CAPSULE, COATED, EXTENDED RELEASE ORAL DAILY
Status: DISCONTINUED | OUTPATIENT
Start: 2024-04-25 | End: 2024-04-25 | Stop reason: HOSPADM

## 2024-04-24 RX ORDER — SODIUM CHLORIDE 9 MG/ML
INJECTION, SOLUTION INTRAVENOUS PRN
Status: DISCONTINUED | OUTPATIENT
Start: 2024-04-24 | End: 2024-04-25 | Stop reason: HOSPADM

## 2024-04-24 RX ORDER — ASPIRIN 81 MG/1
324 TABLET, CHEWABLE ORAL ONCE
Status: COMPLETED | OUTPATIENT
Start: 2024-04-24 | End: 2024-04-24

## 2024-04-24 RX ORDER — ONDANSETRON 2 MG/ML
4 INJECTION INTRAMUSCULAR; INTRAVENOUS EVERY 30 MIN PRN
Status: DISCONTINUED | OUTPATIENT
Start: 2024-04-24 | End: 2024-04-24 | Stop reason: HOSPADM

## 2024-04-24 RX ORDER — ONDANSETRON 4 MG/1
4 TABLET, ORALLY DISINTEGRATING ORAL EVERY 8 HOURS PRN
Status: DISCONTINUED | OUTPATIENT
Start: 2024-04-24 | End: 2024-04-25 | Stop reason: HOSPADM

## 2024-04-24 RX ORDER — ONDANSETRON 2 MG/ML
4 INJECTION INTRAMUSCULAR; INTRAVENOUS EVERY 6 HOURS PRN
Status: DISCONTINUED | OUTPATIENT
Start: 2024-04-24 | End: 2024-04-25 | Stop reason: HOSPADM

## 2024-04-24 RX ORDER — ASPIRIN 81 MG/1
81 TABLET, CHEWABLE ORAL DAILY
Status: DISCONTINUED | OUTPATIENT
Start: 2024-04-25 | End: 2024-04-25 | Stop reason: HOSPADM

## 2024-04-24 RX ORDER — TIZANIDINE 4 MG/1
4 TABLET ORAL EVERY 6 HOURS PRN
Status: DISCONTINUED | OUTPATIENT
Start: 2024-04-24 | End: 2024-04-25 | Stop reason: HOSPADM

## 2024-04-24 RX ORDER — OMEGA-3/DHA/EPA/FISH OIL 300-1000MG
1 CAPSULE ORAL DAILY
Status: DISCONTINUED | OUTPATIENT
Start: 2024-04-25 | End: 2024-04-24 | Stop reason: RX

## 2024-04-24 RX ORDER — DOXAZOSIN MESYLATE 1 MG/1
1 TABLET ORAL DAILY
Status: DISCONTINUED | OUTPATIENT
Start: 2024-04-25 | End: 2024-04-25

## 2024-04-24 RX ORDER — MAGNESIUM HYDROXIDE 400 MG/5ML
1 SUSPENSION, ORAL (FINAL DOSE FORM) ORAL DAILY
Status: DISCONTINUED | OUTPATIENT
Start: 2024-04-25 | End: 2024-04-24 | Stop reason: RX

## 2024-04-24 RX ORDER — FUROSEMIDE 40 MG/1
80 TABLET ORAL DAILY
Status: CANCELLED | OUTPATIENT
Start: 2024-04-25

## 2024-04-24 RX ORDER — ATORVASTATIN CALCIUM 10 MG/1
10 TABLET, FILM COATED ORAL NIGHTLY
Status: DISCONTINUED | OUTPATIENT
Start: 2024-04-24 | End: 2024-04-25 | Stop reason: HOSPADM

## 2024-04-24 RX ORDER — MORPHINE SULFATE 4 MG/ML
4 INJECTION, SOLUTION INTRAMUSCULAR; INTRAVENOUS ONCE
Status: COMPLETED | OUTPATIENT
Start: 2024-04-24 | End: 2024-04-24

## 2024-04-24 RX ORDER — PANTOPRAZOLE SODIUM 40 MG/1
40 TABLET, DELAYED RELEASE ORAL
Status: DISCONTINUED | OUTPATIENT
Start: 2024-04-25 | End: 2024-04-25 | Stop reason: HOSPADM

## 2024-04-24 RX ADMIN — SODIUM CHLORIDE 500 ML: 9 INJECTION, SOLUTION INTRAVENOUS at 20:29

## 2024-04-24 RX ADMIN — ONDANSETRON 4 MG: 2 INJECTION INTRAMUSCULAR; INTRAVENOUS at 20:00

## 2024-04-24 RX ADMIN — IOPAMIDOL 75 ML: 755 INJECTION, SOLUTION INTRAVENOUS at 20:37

## 2024-04-24 RX ADMIN — ASPIRIN 324 MG: 81 TABLET, CHEWABLE ORAL at 21:49

## 2024-04-24 RX ADMIN — MORPHINE SULFATE 4 MG: 4 INJECTION, SOLUTION INTRAMUSCULAR; INTRAVENOUS at 20:00

## 2024-04-24 ASSESSMENT — PAIN SCALES - GENERAL
PAINLEVEL_OUTOF10: 0
PAINLEVEL_OUTOF10: 0

## 2024-04-24 ASSESSMENT — PAIN - FUNCTIONAL ASSESSMENT: PAIN_FUNCTIONAL_ASSESSMENT: 0-10

## 2024-04-24 NOTE — ED PROVIDER NOTES
[facility] EMERGENCY DEPARTMENT  EMERGENCY DEPARTMENT ENCOUNTER        Pt Name: Eric Wang  MRN: [id number]  Birthdate 1945  Date of evaluation: 4/24/2024  Provider: Jeremie Chen PA-C  PCP: Kimo Francois MD  Note Started: 7:37 PM EDT 4/24/24      YOHANA. I have evaluated this patient.      I personally saw the patient and independently provided 32 minutes of non-concurrent critical care time out of the total critical care time provided.  This excludes time spent doing separately billable procedures.  This includes time at the bedside, data interpretation, medication management, obtaining critical history from collateral sources if the patient is unable to provide it directly, and physician consultation.  Specifics of interventions taken and potentially life-threatening diagnostic considerations are listed above in the medical decision making.      CHIEF COMPLAINT       Chief Complaint   Patient presents with    Nausea     Patient in by [facility] from [facility] was eating and had sudden onset diaphoresis and nausea. States this has happened frequently in the past year, has previous hx of MI, see Dr. Sandhu.        HISTORY OF PRESENT ILLNESS: 1 or more Elements     History From: patient  Limitations to history : None    Eric Wang is a 78 y.o. male who presents to the emergency department with a chief complaint of nausea, loose stool, vomiting and generally not feeling well.  He states this is the fourth time that this has happened in the past year.  States he recently had his PPI increased and has history of hiatal hernia and is followed up with his PCP and they thought it was related to his reflux and hiatal hernia.  He states he was eating at [facility] when he began to feel very weak, nauseous, diaphoretic and went to the bathroom and had large amount of loose stool but denies diarrhea and began vomiting.  By the time he gets here he states he is  Respirations SpO2 Height Weight - Scale   04/24/24 1845 04/24/24 1848 04/24/24 1848 04/24/24 1846 04/24/24 1847 04/24/24 1847 04/24/24 1848 04/24/24 1848   (!) 150/56 97.7 °F (36.5 °C) Oral 76 12 94 % 1.676 m (5' 6\") 106.6 kg (235 lb)       Physical Exam  Vitals and nursing note reviewed.   Constitutional:       Appearance: He is well-developed. He is not diaphoretic.   HENT:      Head: Atraumatic.      Nose: Nose normal.   Eyes:      General:         Right eye: No discharge.         Left eye: No discharge.   Cardiovascular:      Rate and Rhythm: Normal rate and regular rhythm.      Heart sounds: No murmur heard.     No friction rub. No gallop.   Pulmonary:      Effort: Pulmonary effort is normal. No respiratory distress.      Breath sounds: No stridor. No wheezing, rhonchi or rales.   Abdominal:      General: Bowel sounds are normal. There is no distension.      Palpations: Abdomen is soft. There is no mass.      Tenderness: There is abdominal tenderness. There is no guarding or rebound.      Hernia: No hernia is present.      Comments: Generalized subjective tenderness without guarding, rebound or rigidity.   Musculoskeletal:         General: No swelling. Normal range of motion.      Cervical back: Normal range of motion.   Skin:     General: Skin is warm and dry.      Findings: No erythema or rash.   Neurological:      Mental Status: He is alert and oriented to person, place, and time.      Cranial Nerves: No cranial nerve deficit.   Psychiatric:         Behavior: Behavior normal.             DIAGNOSTIC RESULTS   LABS:    Labs Reviewed   CBC WITH AUTO DIFFERENTIAL - Abnormal; Notable for the following components:       Result Value    Neutrophils Absolute 8.4 (*)     All other components within normal limits   COMPREHENSIVE METABOLIC PANEL W/ REFLEX TO MG FOR LOW K - Abnormal; Notable for the following components:    Glucose 192 (*)     BUN 32 (*)     Creatinine 1.5 (*)     Est, Glom Filt Rate 48 (*)     AST 52  (gastroesophageal reflux disease), Hyperlipidemia, Hypertension, and Sleep apnea.     EMERGENCY DEPARTMENT COURSE and DIFFERENTIAL DIAGNOSIS/MDM:   Vitals:    Vitals:    04/24/24 2001 04/24/24 2015 04/24/24 2100 04/24/24 2230   BP: (!) 133/52 (!) 136/52 (!) 140/48 (!) 140/42   Pulse: 67 67 58 61   Resp: 21 16 13 12   Temp:       TempSrc:       SpO2: 95% 91% 91%    Weight:       Height:           Patient was given the following medications:  Medications   ondansetron (ZOFRAN) injection 4 mg (4 mg IntraVENous Given 4/24/24 2000)   morphine injection 4 mg (4 mg IntraVENous Given 4/24/24 2000)   sodium chloride 0.9 % bolus 500 mL (0 mLs IntraVENous Stopped 4/24/24 2152)   iopamidol (ISOVUE-370) 76 % injection 75 mL (75 mLs IntraVENous Given 4/24/24 2037)   aspirin chewable tablet 324 mg (324 mg Oral Given 4/24/24 2149)             Is this patient to be included in the SEP-1 Core Measure due to severe sepsis or septic shock?   No   Exclusion criteria - the patient is NOT to be included for SEP-1 Core Measure due to:  Infection is not suspected    Chronic Conditions affecting care:    has a past medical history of Acute MI (HCC), Back pain, CAD (coronary artery disease), GERD (gastroesophageal reflux disease), Hyperlipidemia, Hypertension, and Sleep apnea.    CONSULTS: (Who and What was discussed)  IP CONSULT TO HOSPITALIST  IP CONSULT TO CARDIOLOGY  Dr. Warren    Social Determinants Significantly Affecting Health : None    Records Reviewed (External and Source)     CC/HPI Summary, DDx, ED Course, and Reassessment: Patient presented after he felt generally ill then had some nausea and vomiting, diaphoresis with large BM.  Had some abdominal discomfort.  Has history of coronary artery disease with bypass, hypertension, hyperlipidemia.  He was found to have mildly elevated creatinine 1.5 that could be related to dehydration due to his vomiting.  Troponin was elevated at 40.  He was feeling better after IV morphine and

## 2024-04-24 NOTE — ED NOTES
No PIV or bp on R arm, patient states had vein removed from that arm for previous bypass graft surgery.

## 2024-04-25 VITALS
RESPIRATION RATE: 16 BRPM | HEART RATE: 69 BPM | BODY MASS INDEX: 37.88 KG/M2 | SYSTOLIC BLOOD PRESSURE: 118 MMHG | OXYGEN SATURATION: 92 % | HEIGHT: 66 IN | WEIGHT: 235.7 LBS | TEMPERATURE: 98.6 F | DIASTOLIC BLOOD PRESSURE: 58 MMHG

## 2024-04-25 PROBLEM — N17.9 AKI (ACUTE KIDNEY INJURY) (HCC): Status: ACTIVE | Noted: 2024-04-25

## 2024-04-25 LAB
ANION GAP SERPL CALCULATED.3IONS-SCNC: 12 MMOL/L (ref 3–16)
BUN SERPL-MCNC: 33 MG/DL (ref 7–20)
CALCIUM SERPL-MCNC: 8.5 MG/DL (ref 8.3–10.6)
CHLORIDE SERPL-SCNC: 106 MMOL/L (ref 99–110)
CHOLEST SERPL-MCNC: 117 MG/DL (ref 0–199)
CO2 SERPL-SCNC: 23 MMOL/L (ref 21–32)
CREAT SERPL-MCNC: 1.7 MG/DL (ref 0.8–1.3)
DEPRECATED RDW RBC AUTO: 13.7 % (ref 12.4–15.4)
EKG ATRIAL RATE: 64 BPM
EKG DIAGNOSIS: NORMAL
EKG P-R INTERVAL: 154 MS
EKG Q-T INTERVAL: 448 MS
EKG QRS DURATION: 136 MS
EKG QTC CALCULATION (BAZETT): 462 MS
EKG R AXIS: 172 DEGREES
EKG T AXIS: 42 DEGREES
EKG VENTRICULAR RATE: 64 BPM
GFR SERPLBLD CREATININE-BSD FMLA CKD-EPI: 41 ML/MIN/{1.73_M2}
GLUCOSE SERPL-MCNC: 100 MG/DL (ref 70–99)
HCT VFR BLD AUTO: 39.3 % (ref 40.5–52.5)
HDLC SERPL-MCNC: 49 MG/DL (ref 40–60)
HGB BLD-MCNC: 12.9 G/DL (ref 13.5–17.5)
LDLC SERPL CALC-MCNC: 58 MG/DL
MCH RBC QN AUTO: 30.5 PG (ref 26–34)
MCHC RBC AUTO-ENTMCNC: 32.8 G/DL (ref 31–36)
MCV RBC AUTO: 92.9 FL (ref 80–100)
NT-PROBNP SERPL-MCNC: 279 PG/ML (ref 0–449)
PLATELET # BLD AUTO: 163 K/UL (ref 135–450)
PMV BLD AUTO: 9.4 FL (ref 5–10.5)
POTASSIUM SERPL-SCNC: 4.7 MMOL/L (ref 3.5–5.1)
RBC # BLD AUTO: 4.23 M/UL (ref 4.2–5.9)
SODIUM SERPL-SCNC: 141 MMOL/L (ref 136–145)
TRIGL SERPL-MCNC: 52 MG/DL (ref 0–150)
TROPONIN, HIGH SENSITIVITY: 40 NG/L (ref 0–22)
TROPONIN, HIGH SENSITIVITY: 52 NG/L (ref 0–22)
TSH SERPL DL<=0.005 MIU/L-ACNC: 2.05 UIU/ML (ref 0.27–4.2)
URATE SERPL-MCNC: 7.5 MG/DL (ref 3.5–7.2)
VLDLC SERPL CALC-MCNC: 10 MG/DL
WBC # BLD AUTO: 8.2 K/UL (ref 4–11)

## 2024-04-25 PROCEDURE — 83880 ASSAY OF NATRIURETIC PEPTIDE: CPT

## 2024-04-25 PROCEDURE — 80048 BASIC METABOLIC PNL TOTAL CA: CPT

## 2024-04-25 PROCEDURE — 2580000003 HC RX 258: Performed by: INTERNAL MEDICINE

## 2024-04-25 PROCEDURE — 99223 1ST HOSP IP/OBS HIGH 75: CPT | Performed by: INTERNAL MEDICINE

## 2024-04-25 PROCEDURE — 93010 ELECTROCARDIOGRAM REPORT: CPT | Performed by: INTERNAL MEDICINE

## 2024-04-25 PROCEDURE — C8929 TTE W OR WO FOL WCON,DOPPLER: HCPCS

## 2024-04-25 PROCEDURE — 6360000004 HC RX CONTRAST MEDICATION: Performed by: NURSE PRACTITIONER

## 2024-04-25 PROCEDURE — 84550 ASSAY OF BLOOD/URIC ACID: CPT

## 2024-04-25 PROCEDURE — 84443 ASSAY THYROID STIM HORMONE: CPT

## 2024-04-25 PROCEDURE — 83036 HEMOGLOBIN GLYCOSYLATED A1C: CPT

## 2024-04-25 PROCEDURE — 80061 LIPID PANEL: CPT

## 2024-04-25 PROCEDURE — 2580000003 HC RX 258: Performed by: NURSE PRACTITIONER

## 2024-04-25 PROCEDURE — 84484 ASSAY OF TROPONIN QUANT: CPT

## 2024-04-25 PROCEDURE — 85027 COMPLETE CBC AUTOMATED: CPT

## 2024-04-25 PROCEDURE — 6370000000 HC RX 637 (ALT 250 FOR IP): Performed by: NURSE PRACTITIONER

## 2024-04-25 PROCEDURE — 36415 COLL VENOUS BLD VENIPUNCTURE: CPT

## 2024-04-25 RX ORDER — ASPIRIN 81 MG/1
81 TABLET, CHEWABLE ORAL DAILY
Qty: 30 TABLET | Refills: 3 | COMMUNITY
Start: 2024-04-26

## 2024-04-25 RX ORDER — ASCORBIC ACID 500 MG
500 TABLET ORAL 2 TIMES DAILY
Status: DISCONTINUED | OUTPATIENT
Start: 2024-04-25 | End: 2024-04-25 | Stop reason: HOSPADM

## 2024-04-25 RX ORDER — SODIUM CHLORIDE 9 MG/ML
INJECTION, SOLUTION INTRAVENOUS CONTINUOUS
Status: DISCONTINUED | OUTPATIENT
Start: 2024-04-25 | End: 2024-04-25 | Stop reason: HOSPADM

## 2024-04-25 RX ORDER — DOXAZOSIN MESYLATE 1 MG/1
1 TABLET ORAL NIGHTLY
Status: DISCONTINUED | OUTPATIENT
Start: 2024-04-25 | End: 2024-04-25 | Stop reason: HOSPADM

## 2024-04-25 RX ORDER — ACETYLCYSTEINE 200 MG/ML
600 SOLUTION ORAL; RESPIRATORY (INHALATION) 2 TIMES DAILY
Status: DISCONTINUED | OUTPATIENT
Start: 2024-04-25 | End: 2024-04-25 | Stop reason: HOSPADM

## 2024-04-25 RX ORDER — HYDRALAZINE HYDROCHLORIDE 50 MG/1
50 TABLET, FILM COATED ORAL 2 TIMES DAILY
Qty: 30 TABLET | Refills: 0
Start: 2024-04-25

## 2024-04-25 RX ADMIN — PANTOPRAZOLE SODIUM 40 MG: 40 TABLET, DELAYED RELEASE ORAL at 06:22

## 2024-04-25 RX ADMIN — HYDRALAZINE HYDROCHLORIDE 50 MG: 25 TABLET ORAL at 00:17

## 2024-04-25 RX ADMIN — ATORVASTATIN CALCIUM 10 MG: 10 TABLET, FILM COATED ORAL at 00:17

## 2024-04-25 RX ADMIN — SODIUM CHLORIDE, PRESERVATIVE FREE 10 ML: 5 INJECTION INTRAVENOUS at 08:50

## 2024-04-25 RX ADMIN — ASPIRIN 81 MG: 81 TABLET, CHEWABLE ORAL at 08:45

## 2024-04-25 RX ADMIN — APIXABAN 5 MG: 5 TABLET, FILM COATED ORAL at 00:17

## 2024-04-25 RX ADMIN — OXYCODONE HYDROCHLORIDE 20 MG: 15 TABLET ORAL at 08:46

## 2024-04-25 RX ADMIN — CETIRIZINE HYDROCHLORIDE 10 MG: 10 TABLET, FILM COATED ORAL at 08:45

## 2024-04-25 RX ADMIN — OXYCODONE HYDROCHLORIDE 20 MG: 15 TABLET ORAL at 00:17

## 2024-04-25 RX ADMIN — PERFLUTREN 1.5 ML: 6.52 INJECTION, SUSPENSION INTRAVENOUS at 09:29

## 2024-04-25 RX ADMIN — SODIUM CHLORIDE, PRESERVATIVE FREE 10 ML: 5 INJECTION INTRAVENOUS at 00:18

## 2024-04-25 RX ADMIN — DILTIAZEM HYDROCHLORIDE 240 MG: 120 CAPSULE, EXTENDED RELEASE ORAL at 08:46

## 2024-04-25 RX ADMIN — DOXAZOSIN 1 MG: 1 TABLET ORAL at 00:17

## 2024-04-25 RX ADMIN — HYDRALAZINE HYDROCHLORIDE 50 MG: 25 TABLET ORAL at 08:45

## 2024-04-25 RX ADMIN — CHOLECALCIFEROL TAB 125 MCG (5000 UNIT) 5000 UNITS: 125 TAB at 08:46

## 2024-04-25 RX ADMIN — SODIUM CHLORIDE: 9 INJECTION, SOLUTION INTRAVENOUS at 09:52

## 2024-04-25 ASSESSMENT — PAIN SCALES - GENERAL: PAINLEVEL_OUTOF10: 7

## 2024-04-25 ASSESSMENT — PAIN DESCRIPTION - LOCATION: LOCATION: BACK

## 2024-04-25 NOTE — CONSULTS
[facility]   CONSULTATION  [phone]      Chief Complaint   Patient presents with    Nausea     Patient in by [facility] from [facility], states was eating and had sudden onset diaphoresis and nausea. States this has happened frequently in the past year, has previous hx of MI, see Dr. Sandhu.          History of Present Illness:  Eric Wang is a 78 y.o. has a past medical history of CAD s/p CABG '01 with patent grafts by [facility] '20, diastolic dysfunction, Htn, Hld, ODALYS BiPap. He also has a history of PAF on Eliquis, last dose was around midnight. His primary cardiologist is Dr. Sandhu, he also sees EP for management of his Afib.     He presented to the hospital with complaints of sudden diaphoresis, vomiting, not feeling well while out to dinner.  He states that he started experiencing generalized weakness, diaphoresis, and vomiting.  He denies any associated dizziness, lightheadedness, chest pain, palpitations, fever, chills and abdominal pain.  He denies eating anything out of ordinary.  He states that this is his fourth time of having this episodes this year.  He presented via squad for evaluation.  His symptoms were resolved upon ED arrival.  Workup in the ED revealed elevated troponin, JUAN.  He is being admitted for cardiology evaluation and further workup.     He was at the restaurant yesterday and felt he needed to have a bowel movement, in the restroom he began to have diaphoresis, nausea/vomiting. Angina prior to CABG was pain up into his shoulders. He denies feeing that pain, one episode a few weeks ago he felt soreness In his chest. Currently having bedside echo with tenderness in his rib area while echo tech presses against him.     Past Medical History:   has a past medical history of Acute MI (HCC), Back pain, CAD (coronary artery disease), GERD (gastroesophageal reflux disease), Hyperlipidemia, Hypertension, and Sleep apnea.    Surgical History:   has a past surgical history that

## 2024-04-25 NOTE — PROGRESS NOTES
Pharmacy Home Medication Reconciliation Note    A medication reconciliation has been completed for Eric Wang 1945    Pharmacy: [address] [place], [place]    Information provided by: Patient    The patient's home medication list is as follows:  No current facility-administered medications on file prior to encounter.     Current Outpatient Medications on File Prior to Encounter   Medication Sig Dispense Refill    hydrALAZINE (APRESOLINE) 50 MG tablet Take 1 tablet by mouth 3 times daily (Patient taking differently: Take 1 tablet by mouth 2 times daily) 270 tablet 1    celecoxib (CELEBREX) 200 MG capsule Take 1 capsule by mouth daily      furosemide (LASIX) 80 MG tablet Take 1 tablet by mouth daily      lisinopril (PRINIVIL;ZESTRIL) 20 MG tablet Take 1 tablet by mouth daily 90 tablet 0    Berberine Chloride (BERBERINE HCI) 500 MG CAPS Take 1 capsule by mouth Daily      dilTIAZem (TIAZAC) 240 MG extended release capsule Take 1 capsule by mouth daily 90 capsule 4    apixaban (ELIQUIS) 5 MG TABS tablet Take 1 tablet by mouth 2 times daily      atorvastatin (LIPITOR) 10 MG tablet TAKE 1 TABLET BY MOUTH ONE TIME A DAY (Patient taking differently: Take 1 tablet by mouth nightly TAKE 1 TABLET BY MOUTH ONE TIME A DAY) 90 tablet 4    terazosin (HYTRIN) 1 MG capsule Take 1 capsule by mouth nightly      Turmeric 400 MG CAPS Take 1 capsule by mouth Daily      CRANBERRY EXTRACT PO Take 1 capsule by mouth Daily      Resveratrol 100 MG CAPS Take 1 capsule by mouth daily      Probiotic Product (PROBIOTIC ADVANCED PO) Take 1 capsule by mouth Daily      ACETYLCARNITINE HCL PO Take 1 tablet by mouth Daily      UNABLE TO FIND circulation and vein support      tiZANidine (ZANAFLEX) 4 MG tablet Take 1 tablet by mouth every 6 hours as needed      fexofenadine (ALLEGRA) 180 MG tablet Take 1 tablet by mouth daily      Cholecalciferol (VITAMIN D3) 5000 UNITS TABS Take 1 tablet by mouth daily      magnesium gluconate

## 2024-04-25 NOTE — DISCHARGE SUMMARY
V2.0  Discharge Summary    Name:  Eric Wang /Age/Sex: 1945 (78 y.o. male)   Admit Date: 2024  Discharge Date: 24    MRN & CSN:  [id number] & [id number] Encounter Date and Time 24 1:38 PM EDT    Attending:  Alisa Pompa MD Discharging Provider: Alisa Pompa MD       Hospital Course:     Brief HPI: Eric Wang is a 78 y.o. male with a pmh of CAD s/p CABG in , HTN, aortic regurg, PAF, HLD, ODALYS on CPAP and chronic HFpEF who presented for evaluation following an acute transient episode of sudden diaphoresis, vomiting, not feeling well while at the restaurant. He denied any associated dizziness, lightheadedness, chest pain, palpitations, fever, chills and abdominal pain but stated that was his fourth time of having this episodes this year. In the ED he was noted to have elevated troponin. CT abdomen pelvis nonacute.  He was admitted for further cardiac evaluation.    Acute onset diaphoresis, not feeling well, vomiting with elevated troponin: r/o ACS  hs-troponin 40-->60-->52.  EKG without noted acute ischemia.  Chest x-ray showed mild cardiomegaly without acute findings.  CT abdomen pelvis nonacute.   TTE 55 to 60% 2024: LVEF 55 to 60%.  Grade 1 DD.  Continued statin and aspirin.  Fasting lipid panel, A1c pending  Cardiology consulted; recommended optimizing renal function prior to cath.         JUAN: Creatinine up to 1.7. Baseline around 1.3.    Lisinopril discontinued.  Nephrology consulted; recommended IV fluids and monitoring of renal function.       Chronic diastolic CHF:  Not in acute exacerbation.  Lasix held.       Hypertension: BP monitored on hydralazine and diltiazem.     PAF: Currently rate controlled.    Home diltiazem and Eliquis resumed.     Obstructive sleep apnea: On CPAP.      The patient has decided to leave against medical advice, because he has to take care of his dog at home.     He has normal mental status and adequate capacity to make medical    Component Value Date/Time    NITRU Negative 04/24/2024 10:17 PM    COLORU DARK YELLOW 04/24/2024 10:17 PM    PHUR 5.5 04/24/2024 10:17 PM    WBCUA 5 04/24/2024 10:17 PM    RBCUA 1 04/24/2024 10:17 PM    BACTERIA None Seen 04/24/2024 10:17 PM    CLARITYU Clear 04/24/2024 10:17 PM    SPECGRAV >=1.030 04/24/2024 10:17 PM    LEUKOCYTESUR Negative 04/24/2024 10:17 PM    UROBILINOGEN 1.0 04/24/2024 10:17 PM    BILIRUBINUR Negative 04/24/2024 10:17 PM    BILIRUBINUR NEGATIVE 02/19/2011 10:10 AM    BLOODU Negative 04/24/2024 10:17 PM    GLUCOSEU Negative 04/24/2024 10:17 PM    GLUCOSEU NEGATIVE 02/19/2011 10:10 AM    KETUA Negative 04/24/2024 10:17 PM     Urine Cultures: No results found for: \"LABURIN\"  Blood Cultures: No results found for: \"BC\"  No results found for: \"BLOODCULT2\"  Organism: No results found for: \"ORG\"    Time Spent Discharging patient 35 minutes    Electronically signed by Alisa Pompa MD on 4/25/2024 at 1:59 PM

## 2024-04-25 NOTE — CARE COORDINATION
Discharge Planning Note:    Chart reviewed and it appears that patient has minimal needs for discharge at this time. Risk Score N/A %     Primary Care Physician is Kimo Francois MD    Primary insurance is Medicare    Please notify case management if any discharge needs are identified.      Case management will continue to follow progress and update discharge plan as needed.   Jeanne Samayoa RN Case Manager  [phone]

## 2024-04-25 NOTE — H&P
V2.0  History and Physical      Name:  Eric Wang /Age/Sex: 1945  (78 y.o. male)   MRN & CSN:  [id number] & [id number] Encounter Date/Time: 2024 10:13 PM EDT   Location:  ED- PCP: Kimo Francois MD       Hospital Day: 1    Assessment and Plan:   Eric Wang is a 78 y.o. male with a pmh of CAD status post CABG in , hypertension, aortic regurg, PAF, hyperlipidemia, obstructive sleep apnea on CPAP and chronic diastolic CHF who presents for evaluation following an acute transient episode of sudden diaphoresis, vomiting, not feeling well while at the restaurant.  Noted to have elevated troponin.  CT abdomen pelvis nonacute.  He has been admitted for cardiology evaluation since this is his fourth episode this year.    Hospital Problems             Last Modified POA    * (Principal) Elevated troponin 2024 Yes       Plan:  # Acute onset diaphoresis, not feeling well, vomiting.  This happened while at the restaurant.  This is his fourth episode this year.  # Elevated troponin.  # CAD status post CABG in   -EKG without noted acute ischemia.  -Chest x-ray showed mild cardiomegaly without acute findings.  -CT abdomen pelvis nonacute.  Sigmoid diverticulosis.  -Initial troponin of 40 repeat at 60.  -Given full dose aspirin in the ED.  -Admit to [facility] telemetry  -Keep n.p.o. postmidnight for cardiology evaluation.  -Consult cardiology.  -Trend troponin.  -Home statin resumed.  Add low-dose aspirin.  -Fasting lipid panel, A1c and TSH in AM.  -Check orthostatic vitals x 1.  -Check echo    # JUAN.  Creatinine of 1.5.  Baseline around 1.3.  holding lisinopril.  Avoid nephrotoxins.  Repeat renal panel in AM if worsens consider nephrology consultation.  He has CHF has been cautious with hydration.  He received 500 cc bolus in the ED.  He is drinking adequately.  Holding off further IV fluids.    # Chronic diastolic CHF with preserved EF.  Not in acute exacerbation.  EF of 55% from echo

## 2024-04-25 NOTE — ED NOTES
ED TO INPATIENT SBAR HANDOFF    Patient Name: Eric Wang   :  1945  78 y.o.   MRN:  [id number]  Preferred Name  Eric  ED Room #:  ED-0012/12  Family/Caregiver Present no   Restraints no   Sitter no   Sepsis Risk Score Sepsis Risk Score: 1.96    Situation  Code Status: No Order No additional code details.    Allergies: Carvedilol, Codeine, and Imdur [isosorbide nitrate]  Weight: Patient Vitals for the past 96 hrs (Last 3 readings):   Weight   24 1848 106.6 kg (235 lb)     Arrived from: home  Chief Complaint:   Chief Complaint   Patient presents with    Nausea     [facility] states was eating and had sudden onset diaphoresis and nausea. States this has happened frequently in the past year, has previous hx of MI, see Dr. Sandhu.      Hospital Problem/Diagnosis:  Principal Problem:    Elevated troponin  Resolved Problems:    * No resolved hospital problems. *    Imaging:   CT ABDOMEN PELVIS W IV CONTRAST Additional Contrast? None   Final Result   1. No acute pathology identified in the abdomen or pelvis.   2. Mild sigmoid colonic diverticulosis without evidence of acute   diverticulitis.         XR CHEST PORTABLE   Final Result   Status post CABG surgery and mild cardiomegaly with no acute pulmonary   abnormality.           Abnormal labs:   Abnormal Labs Reviewed   CBC WITH AUTO DIFFERENTIAL - Abnormal; Notable for the following components:       Result Value    Neutrophils Absolute 8.4 (*)     All other components within normal limits   COMPREHENSIVE METABOLIC PANEL W/ REFLEX TO MG FOR LOW K - Abnormal; Notable for the following components:    Glucose 192 (*)     BUN 32 (*)     Creatinine 1.5 (*)     Est, Glom Filt Rate 48 (*)     AST 52 (*)     All other components within normal limits   TROPONIN - Abnormal; Notable for the following components:    Troponin, High Sensitivity 40 (*)     All other components within normal limits   URINALYSIS WITH REFLEX TO CULTURE -  provide details: n/a  Pending Blood Product Administration: no       You may also review the ED PT Care Timeline found under the Summary Nursing Index tab.    Recommendation    Pending orders ED orders completed  Plan for Discharge (if known):   Additional Comments: n/a   If any further questions, please call Sending RN at ED    Electronically signed by: Electronically signed by Tammie Wan RN on 4/24/2024 at 10:32 PM

## 2024-04-25 NOTE — CONSULTS
MD Ghassan Monge MD Samir Brahmbhatt, MD                  Office: [phone]                      Fax: [phone]             [employer]                   NEPHROLOGY INITIAL CONSULT NOTE:     PATIENT NAME: Eric Wang  : 1945  MRN: [id number]  REASON FOR CONSULT: For evaluation and management of Acute Kidney Injury . (My recommendations will be communicated by way of shared medical record.)      RECOMMENDATIONS:   Re: CI-JUAN:   - This patient is at HIGHER risk: per Johnathon risk scoring:   - would suggest to avoid in non-urgent situation  - would try to maximize IRAIDA prophyalaxic measure with :    : IVFs  W/ NS: 1 mL/kg/hour for 6 to 12 hours preprocedure, intraprocedure, and for 6 to 12 hours    -ok to continue NS 75 cc/hr now    -holding home lasix  -: adjust IVF  per LVEDP, after LHC  + High dose statin : defer to cardiology team.  + NAC + Ascorbic acid (these may help, but inconsistent data) - so Rx.   + ACE-I: holding Lisinopril      From home meds for antihypertension, continue terazosin,  Added hydralazine 50 mg twice daily     check UA w/ microscopy, urine lytes,  monitor PVR w/ bladder scan for salinas insertion need.     no need for dialysis,    at higher risk for decompensation, needing closer monitoring.      D/C plan from renal stand point:- likely ~2-3 days      Thank you for allowing me to participate in this patient's care. Please do not hesitate to contact me anytime. We will follow along with you.       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  There are multiple bilateral renal cysts with the largest  on the right measuring 5.7 cm.  No hydronephrosis.: \"  -But no hematuria, will need outpatient follow-up also        Associated problems:   - Volume status: mild hypovolemic  - HTN : no need for tight control   - Na: WNL  - Azotemia: pre-renal  - Electrolytes: stable   - Acid-Base: wnl   - Anemia: of chronic disease: mild       Other major problems: Management per primary and other consulting teams.    Patient Active Problem List   Diagnosis    HLD (hyperlipidemia)    Essential hypertension, benign    Coronary Artery Disease    ODALYS (obstructive sleep apnea)    Sebaceous cyst    Hyperkalemia    Abnormal cardiovascular stress test    Class 2 severe obesity due to excess calories with serious comorbidity and body mass index (BMI) of 37.0 to 37.9 in adult (HCC)    Mitral regurgitation    Chronic diastolic heart failure (HCC)    Bradycardia    Paroxysmal atrial fibrillation (HCC)    Elevated troponin             : other supportive care :   - Check daily renal function panel with electrolytes-phosphorus  - Strict monitoring of I/Os, daily weight  - Renal feeds/diet  - Current medications reviewed.    - Nephrotoxic medications have been discontinued.    - Dose adjusted and appropriate.     - Dose meds for eGFR <15 mL/min/1.73m2 during JUAN    - Avoid heavy opioids due to renal failure - may use very low dose dilaudid / fentanyl with close monitoring of CNS and respiratory depression.       Please refer to the orders.   Medical decision making- High Complexity. Multiple complex

## 2024-04-25 NOTE — ED NOTES
How does patient ambulate?   []Low Fall Risk (ambulates by themselves without support)  [x]Stand by assist   []Contact Guard   []Front wheel walker  []Wheelchair   []Steady  []Bed bound  []History of Lower Extremity Amputation  []Unknown, did not assess in the emergency department   How does patient take pills?  [x]Whole with Water  []Crushed in applesauce  []Crushed in pudding  []Other  []Unknown no oral medications were given in the ED  Is patient alert?   [x]Alert  []Drowsy but responds to voice  []Doesn't respond to voice but responds to painful stimuli  []Unresponsive  Is patient oriented?   [x]To person  [x]To place  [x]To time  [x]To situation  []Confused  []Agitated  []Follows commands  If patient is disoriented or from a Skill Nursing Facility has family been notified of admission?   []Yes   [x]No  Patient belongings?   [x]Cell phone  [x]Wallet   []Dentures  []Clothing  Any specific patient or family belongings/needs/dynamics?   N/a  Miscellaneous comments/pending orders?  N/a     If there are any additional questions please reach out to the Emergency Department.

## 2024-04-25 NOTE — PROGRESS NOTES
Pt has stated that he wishes to leave and can drink more water at home. Nephrology and Cardiology and hospitalist aware. AMA paperwork given to patient. IV removed..Witness and signed

## 2024-04-26 ENCOUNTER — TELEPHONE (OUTPATIENT)
Dept: CARDIOLOGY CLINIC | Age: 79
End: 2024-04-26

## 2024-04-26 LAB
EST. AVERAGE GLUCOSE BLD GHB EST-MCNC: 119.8 MG/DL
HBA1C MFR BLD: 5.8 %

## 2024-04-26 NOTE — TELEPHONE ENCOUNTER
Mr. Wang states he was admitted 4/24/24 for sudden diaphoresis, vomiting, not feeling well while at a restaurant. Trop elevated. CT abdomen pelvis nonacute. Creat reached 1.7, Lisinopril & Lasix d/c'd. Cards consulted> made NPO for possible LHC. He signed himself out yesterday 4/25/24 (reason unclear). LHC was not performed. However, ECHO was done before he left.    According to him, his B/P was 150/60('s) at Dr. Francois's office today; he had blood work drawn at his office, results are pending.     He is also asking for an alternative to diltiazem as it is costing him $66 for 3 months which is high for him.

## 2024-04-26 NOTE — TELEPHONE ENCOUNTER
Pt states he was in [facility] and states they stopped his Lisinopril. Asking if [facility] could review and call pt to advise.

## 2024-04-29 NOTE — TELEPHONE ENCOUNTER
Spoke to patient. He still has 5 months of Diltiazem left so he doesn't need to switch CCB now. Scheduled fu appt for 5/15. Will call Dr Francois's office for lab results.

## 2024-05-20 ENCOUNTER — TELEPHONE (OUTPATIENT)
Dept: CARDIOLOGY CLINIC | Age: 79
End: 2024-05-20

## 2024-05-20 DIAGNOSIS — N28.9 RENAL INSUFFICIENCY: Primary | ICD-10-CM

## 2024-05-20 NOTE — TELEPHONE ENCOUNTER
Pt is calling to see if he should still be taking Lisinopril.  The hospital wanted him to stop taking it, but he would like to know if [facility]K wants him to stop, or keep taking it.    Please advise.

## 2024-05-20 NOTE — TELEPHONE ENCOUNTER
Spoke with patient b/p today 134/59. Patient stated his b/p has been 120-130's/60's.   He is still taking Lisinopril 20mg daily.    Patient also wants to know if [facility] would like to see him in office.

## 2024-05-20 NOTE — TELEPHONE ENCOUNTER
Per hospital discharge note-  JUAN: Creatinine up to 1.7. Baseline around 1.3.    Lisinopril discontinued.  Nephrology consulted; recommended IV fluids and monitoring of renal function.    Cancelled 5/15 hosp fu appt with MM[facility]   Repeat labs on 4/26/24 with Dr Alessandro Munoz 1.19 (scanned in media)

## 2024-05-24 PROBLEM — R79.89 ELEVATED TROPONIN: Status: RESOLVED | Noted: 2024-04-24 | Resolved: 2024-05-24

## 2024-06-06 ENCOUNTER — HOSPITAL ENCOUNTER (OUTPATIENT)
Age: 79
Discharge: HOME | End: 2024-06-06
Payer: MEDICARE

## 2024-06-06 DIAGNOSIS — N28.9 RENAL INSUFFICIENCY: ICD-10-CM

## 2024-06-06 LAB
ANION GAP SERPL CALCULATED.3IONS-SCNC: 11 MMOL/L (ref 3–16)
BUN SERPL-MCNC: 20 MG/DL (ref 7–20)
CALCIUM SERPL-MCNC: 9.5 MG/DL (ref 8.3–10.6)
CHLORIDE SERPL-SCNC: 101 MMOL/L (ref 99–110)
CO2 SERPL-SCNC: 26 MMOL/L (ref 21–32)
CREAT SERPL-MCNC: 1 MG/DL (ref 0.8–1.3)
GFR SERPLBLD CREATININE-BSD FMLA CKD-EPI: 78 ML/MIN/{1.73_M2}
GLUCOSE SERPL-MCNC: 111 MG/DL (ref 70–99)
POTASSIUM SERPL-SCNC: 4.9 MMOL/L (ref 3.5–5.1)
SODIUM SERPL-SCNC: 138 MMOL/L (ref 136–145)

## 2024-06-06 PROCEDURE — 36415 COLL VENOUS BLD VENIPUNCTURE: CPT

## 2024-06-06 PROCEDURE — 80048 BASIC METABOLIC PNL TOTAL CA: CPT

## 2024-06-07 ENCOUNTER — TELEPHONE (OUTPATIENT)
Dept: CARDIOLOGY CLINIC | Age: 79
End: 2024-06-07

## 2024-06-07 NOTE — TELEPHONE ENCOUNTER
Per MMK - Tell pt. their labs are good. F/u in clinic as planned.     I called Eric and notified of the above and verbalized understanding.  Denied any questions and/or concerns.

## 2024-06-07 NOTE — TELEPHONE ENCOUNTER
Spoke with the patient and advised him of the results below per MMK. Patient voiced understanding . Call complete

## 2024-06-07 NOTE — TELEPHONE ENCOUNTER
----- Message from Andres Sandhu MD sent at 6/7/2024 11:46 AM EDT -----  Tell pt. their labs are good. F/u in clinic as planned.      WILMAR

## 2024-06-11 ENCOUNTER — OFFICE VISIT (OUTPATIENT)
Dept: CARDIOLOGY CLINIC | Age: 79
End: 2024-06-11
Payer: MEDICARE

## 2024-06-11 VITALS
OXYGEN SATURATION: 94 % | SYSTOLIC BLOOD PRESSURE: 128 MMHG | WEIGHT: 232 LBS | BODY MASS INDEX: 37.28 KG/M2 | HEIGHT: 66 IN | HEART RATE: 61 BPM | DIASTOLIC BLOOD PRESSURE: 70 MMHG

## 2024-06-11 DIAGNOSIS — E66.01 CLASS 2 SEVERE OBESITY DUE TO EXCESS CALORIES WITH SERIOUS COMORBIDITY AND BODY MASS INDEX (BMI) OF 37.0 TO 37.9 IN ADULT (HCC): ICD-10-CM

## 2024-06-11 DIAGNOSIS — I48.0 PAROXYSMAL ATRIAL FIBRILLATION (HCC): Chronic | ICD-10-CM

## 2024-06-11 DIAGNOSIS — I10 ESSENTIAL HYPERTENSION, BENIGN: Primary | Chronic | ICD-10-CM

## 2024-06-11 DIAGNOSIS — I34.0 NONRHEUMATIC MITRAL VALVE REGURGITATION: ICD-10-CM

## 2024-06-11 DIAGNOSIS — I25.10 ATHEROSCLEROSIS OF NATIVE CORONARY ARTERY OF NATIVE HEART WITHOUT ANGINA PECTORIS: Chronic | ICD-10-CM

## 2024-06-11 DIAGNOSIS — G47.33 OSA (OBSTRUCTIVE SLEEP APNEA): Chronic | ICD-10-CM

## 2024-06-11 PROCEDURE — G8428 CUR MEDS NOT DOCUMENT: HCPCS | Performed by: INTERNAL MEDICINE

## 2024-06-11 PROCEDURE — 3074F SYST BP LT 130 MM HG: CPT | Performed by: INTERNAL MEDICINE

## 2024-06-11 PROCEDURE — 99214 OFFICE O/P EST MOD 30 MIN: CPT | Performed by: INTERNAL MEDICINE

## 2024-06-11 PROCEDURE — 3078F DIAST BP <80 MM HG: CPT | Performed by: INTERNAL MEDICINE

## 2024-06-11 PROCEDURE — G8417 CALC BMI ABV UP PARAM F/U: HCPCS | Performed by: INTERNAL MEDICINE

## 2024-06-11 PROCEDURE — 1036F TOBACCO NON-USER: CPT | Performed by: INTERNAL MEDICINE

## 2024-06-11 PROCEDURE — 1123F ACP DISCUSS/DSCN MKR DOCD: CPT | Performed by: INTERNAL MEDICINE

## 2024-06-11 NOTE — PROGRESS NOTES
[facility]  Cardiac Follow Up     Referring Provider:  Kimo Francois MD     Chief Complaint   Patient presents with    Follow-Up from Hospital    Coronary Artery Disease    Hypertension    Hyperlipidemia        History of Present Illness:    Mr. Wang is seen today for  follow up for management of CAD, hypertension, hyperlipidemia.  He also has ODALYS uses Bipap..     He was admitted 4/2024 with diaphoresis and nausea at a restaurant. He had acute renal insufficiency and mild flat elevation in troponin. EKG without ischemic change. He left AMA prior to any ischemic testing. ECHO technically difficult, but normal LV function. He is doing OK. He thinks this was his \"hiatal hernia\".     Past Medical History: has a past medical history of Acute MI (HCC), Back pain, CAD (coronary artery disease), GERD (gastroesophageal reflux disease), Hyperlipidemia, Hypertension, and Sleep apnea.  Surgical History: has a past surgical history that includes Cardiac surgery; back surgery; Hand surgery; Gallbladder surgery; Coronary artery bypass graft; cyst removal (N/A, 1970); and Prostate biopsy (2020).   Social History: reports that he quit smoking about 24 years ago. His smoking use included cigarettes. He has never been exposed to tobacco smoke. He has never used smokeless tobacco. He reports that he does not drink alcohol and does not use drugs.   Family History:family history includes Cancer in his sister; Heart Attack (age of onset: 65) in his father; Heart Disease in his father and mother; Kidney Disease in his mother.   Home Medications:  Prior to Visit Medications    Medication Sig Taking? Authorizing Provider   hydrALAZINE (APRESOLINE) 50 MG tablet Take 1 tablet by mouth 2 times daily Yes Alisa Pompa MD   Berberine Chloride (BERBERINE HCI) 500 MG CAPS Take 1 capsule by mouth Daily Yes ProviderRj MD   dilTIAZem (TIAZAC) 240 MG extended release capsule Take 1 capsule by mouth daily Yes Edson

## 2024-06-24 ENCOUNTER — OFFICE VISIT (OUTPATIENT)
Dept: PULMONOLOGY | Age: 79
End: 2024-06-24
Payer: MEDICARE

## 2024-06-24 VITALS
HEART RATE: 90 BPM | SYSTOLIC BLOOD PRESSURE: 126 MMHG | OXYGEN SATURATION: 98 % | WEIGHT: 234 LBS | HEIGHT: 66 IN | DIASTOLIC BLOOD PRESSURE: 72 MMHG | BODY MASS INDEX: 37.61 KG/M2

## 2024-06-24 DIAGNOSIS — I25.10 ATHEROSCLEROSIS OF NATIVE CORONARY ARTERY OF NATIVE HEART WITHOUT ANGINA PECTORIS: ICD-10-CM

## 2024-06-24 DIAGNOSIS — G47.33 OSA (OBSTRUCTIVE SLEEP APNEA): Primary | ICD-10-CM

## 2024-06-24 DIAGNOSIS — I10 ESSENTIAL HYPERTENSION, BENIGN: ICD-10-CM

## 2024-06-24 DIAGNOSIS — E66.01 CLASS 2 SEVERE OBESITY DUE TO EXCESS CALORIES WITH SERIOUS COMORBIDITY AND BODY MASS INDEX (BMI) OF 37.0 TO 37.9 IN ADULT (HCC): ICD-10-CM

## 2024-06-24 DIAGNOSIS — I48.0 PAROXYSMAL ATRIAL FIBRILLATION (HCC): ICD-10-CM

## 2024-06-24 DIAGNOSIS — I50.32 CHRONIC DIASTOLIC HEART FAILURE (HCC): ICD-10-CM

## 2024-06-24 PROCEDURE — 3074F SYST BP LT 130 MM HG: CPT | Performed by: NURSE PRACTITIONER

## 2024-06-24 PROCEDURE — 1123F ACP DISCUSS/DSCN MKR DOCD: CPT | Performed by: NURSE PRACTITIONER

## 2024-06-24 PROCEDURE — 3078F DIAST BP <80 MM HG: CPT | Performed by: NURSE PRACTITIONER

## 2024-06-24 PROCEDURE — G2211 COMPLEX E/M VISIT ADD ON: HCPCS | Performed by: NURSE PRACTITIONER

## 2024-06-24 PROCEDURE — 1036F TOBACCO NON-USER: CPT | Performed by: NURSE PRACTITIONER

## 2024-06-24 PROCEDURE — G8417 CALC BMI ABV UP PARAM F/U: HCPCS | Performed by: NURSE PRACTITIONER

## 2024-06-24 PROCEDURE — G8427 DOCREV CUR MEDS BY ELIG CLIN: HCPCS | Performed by: NURSE PRACTITIONER

## 2024-06-24 PROCEDURE — 99214 OFFICE O/P EST MOD 30 MIN: CPT | Performed by: NURSE PRACTITIONER

## 2024-06-24 ASSESSMENT — SLEEP AND FATIGUE QUESTIONNAIRES
HOW LIKELY ARE YOU TO NOD OFF OR FALL ASLEEP WHILE SITTING INACTIVE IN A PUBLIC PLACE: SLIGHT CHANCE OF DOZING
HOW LIKELY ARE YOU TO NOD OFF OR FALL ASLEEP WHILE SITTING AND TALKING TO SOMEONE: SLIGHT CHANCE OF DOZING
HOW LIKELY ARE YOU TO NOD OFF OR FALL ASLEEP WHILE SITTING AND READING: MODERATE CHANCE OF DOZING
HOW LIKELY ARE YOU TO NOD OFF OR FALL ASLEEP WHILE WATCHING TV: MODERATE CHANCE OF DOZING
HOW LIKELY ARE YOU TO NOD OFF OR FALL ASLEEP WHILE LYING DOWN TO REST IN THE AFTERNOON WHEN CIRCUMSTANCES PERMIT: MODERATE CHANCE OF DOZING
HOW LIKELY ARE YOU TO NOD OFF OR FALL ASLEEP WHEN YOU ARE A PASSENGER IN A CAR FOR AN HOUR WITHOUT A BREAK: SLIGHT CHANCE OF DOZING
HOW LIKELY ARE YOU TO NOD OFF OR FALL ASLEEP IN A CAR, WHILE STOPPED FOR A FEW MINUTES IN TRAFFIC: WOULD NEVER DOZE
ESS TOTAL SCORE: 11
HOW LIKELY ARE YOU TO NOD OFF OR FALL ASLEEP WHILE SITTING QUIETLY AFTER LUNCH WITHOUT ALCOHOL: MODERATE CHANCE OF DOZING

## 2024-06-24 NOTE — ASSESSMENT & PLAN NOTE
Chronic-not stable:  Discussed importance of treating obstructive sleep apnea and getting sufficient sleep to assist with weight control.  Encouraged him to work on weight loss through diet and exercise. Recommended DASH or Mediterranean diets.'

## 2024-06-24 NOTE — PROGRESS NOTES
Pk Prajapati CNP  Shauna Zaidi CNP [place] [facility]  [address]  [address]  [place] [address]  P- [phone]  F- [phone]     [facility] PHYSICIANS [place] [facility] SLEEP MEDICINE  [address]  [address]  [facility] [address]  Dept: [phone]  Dept Fax: [phone]  Loc: [phone]      Assessment/Plan:      1. ODALYS (obstructive sleep apnea)  Assessment & Plan:  Chronic - With progression/exacerbation: Reviewed and analyzed results of physiologic download from patient's machine and reviewed with patients. Supplies and parts as needed for the machine. These are medically necessary. Limit caffeine use after 3 PM. Based on the analyzed data, we will make the following changes: EPAP min 17, PS 3. If no improvement, we will consider in lab titration study for further evaluation. He sometimes sleeping over 10 hours, which can also cause daytime symptoms. Encouraged him to aim total 7- 8 hors sleep daily, not oversleeping. Also encouraged adequate exercise to help with sleep qualify as well. Will check the data and symptoms in 7-10 days to see how he is doing. He will return in 6 weeks for follow up. Instructed him to return sooner or contact the office if experiences new or worsening of symptoms. Encouraged him to continue to use his machine each night. Encouraged the patient to contact the office with any questions or concerns.  2. Atherosclerosis of native coronary artery of native heart without angina pectoris  Assessment & Plan:   Chronic- Stable.  Discussed the importance of treating sleep apnea as part of the management of this disorder.  Cont any meds per PCP and other physicians.    3. Chronic diastolic heart failure (HCC)  Assessment & Plan:   Chronic- Stable.  Discussed the importance of treating sleep apnea as part of the management of this disorder.  Cont any meds per PCP and other physicians.    4. Paroxysmal atrial fibrillation

## 2024-06-24 NOTE — ASSESSMENT & PLAN NOTE
Chronic - With progression/exacerbation: Reviewed and analyzed results of physiologic download from patient's machine and reviewed with patients. Supplies and parts as needed for the machine. These are medically necessary. Limit caffeine use after 3 PM. Based on the analyzed data, we will make the following changes: EPAP min 17, PS 3. If no improvement, we will consider in lab titration study for further evaluation. He sometimes sleeping over 10 hours, which can also cause daytime symptoms. Encouraged him to aim total 7- 8 hors sleep daily, not oversleeping. Also encouraged adequate exercise to help with sleep qualify as well. Will check the data and symptoms in 7-10 days to see how he is doing. He will return in 6 weeks for follow up. Instructed him to return sooner or contact the office if experiences new or worsening of symptoms. Encouraged him to continue to use his machine each night. Encouraged the patient to contact the office with any questions or concerns.

## 2024-07-11 ENCOUNTER — TELEPHONE (OUTPATIENT)
Dept: PULMONOLOGY | Age: 79
End: 2024-07-11

## 2024-07-11 NOTE — TELEPHONE ENCOUNTER
Pressure was lowered (EPAP 15) due to aerophagia/air swallowing. Change was sent to the machine remotely. Call back if no improvement.

## 2024-07-11 NOTE — TELEPHONE ENCOUNTER
Pt called to give Shauna a update since the pressure change at last visit. Pt stated he is having bad chest pain and stomach feels bloated. Please advise.     PH:[phone]

## 2024-07-30 ENCOUNTER — TELEPHONE (OUTPATIENT)
Dept: CARDIOLOGY CLINIC | Age: 79
End: 2024-07-30

## 2024-07-30 DIAGNOSIS — I10 ESSENTIAL HYPERTENSION, BENIGN: Chronic | ICD-10-CM

## 2024-07-30 RX ORDER — HYDRALAZINE HYDROCHLORIDE 50 MG/1
75 TABLET, FILM COATED ORAL 2 TIMES DAILY
Qty: 270 TABLET | Refills: 0
Start: 2024-07-30

## 2024-07-30 NOTE — TELEPHONE ENCOUNTER
Spoke with patient he stated B/P has been 160/60 for over a week. Patient is having headaches no other symptoms at this time.

## 2024-07-30 NOTE — TELEPHONE ENCOUNTER
Tried to call patient back to talk with him about elevated BP's.     Per MMK- He can increase Hydralazine to 75 mg BID.

## 2024-07-30 NOTE — TELEPHONE ENCOUNTER
Pt called stating BP in resting position in the 160's for proximally week now, pt denies any other symptoms.    Pls advise thank you

## 2024-08-05 ENCOUNTER — OFFICE VISIT (OUTPATIENT)
Dept: PULMONOLOGY | Age: 79
End: 2024-08-05
Payer: MEDICARE

## 2024-08-05 ENCOUNTER — TELEPHONE (OUTPATIENT)
Dept: CARDIOLOGY CLINIC | Age: 79
End: 2024-08-05

## 2024-08-05 VITALS
SYSTOLIC BLOOD PRESSURE: 138 MMHG | OXYGEN SATURATION: 96 % | HEART RATE: 72 BPM | BODY MASS INDEX: 37.69 KG/M2 | HEIGHT: 65 IN | DIASTOLIC BLOOD PRESSURE: 70 MMHG | WEIGHT: 226.2 LBS

## 2024-08-05 DIAGNOSIS — G47.33 OSA (OBSTRUCTIVE SLEEP APNEA): Primary | ICD-10-CM

## 2024-08-05 DIAGNOSIS — E66.01 CLASS 2 SEVERE OBESITY DUE TO EXCESS CALORIES WITH SERIOUS COMORBIDITY AND BODY MASS INDEX (BMI) OF 37.0 TO 37.9 IN ADULT (HCC): ICD-10-CM

## 2024-08-05 DIAGNOSIS — I50.32 CHRONIC DIASTOLIC HEART FAILURE (HCC): ICD-10-CM

## 2024-08-05 DIAGNOSIS — I25.10 ATHEROSCLEROSIS OF NATIVE CORONARY ARTERY OF NATIVE HEART WITHOUT ANGINA PECTORIS: ICD-10-CM

## 2024-08-05 DIAGNOSIS — I10 ESSENTIAL HYPERTENSION, BENIGN: ICD-10-CM

## 2024-08-05 DIAGNOSIS — I48.0 PAROXYSMAL ATRIAL FIBRILLATION (HCC): ICD-10-CM

## 2024-08-05 PROCEDURE — G8417 CALC BMI ABV UP PARAM F/U: HCPCS | Performed by: NURSE PRACTITIONER

## 2024-08-05 PROCEDURE — 3075F SYST BP GE 130 - 139MM HG: CPT | Performed by: NURSE PRACTITIONER

## 2024-08-05 PROCEDURE — 99214 OFFICE O/P EST MOD 30 MIN: CPT | Performed by: NURSE PRACTITIONER

## 2024-08-05 PROCEDURE — G8427 DOCREV CUR MEDS BY ELIG CLIN: HCPCS | Performed by: NURSE PRACTITIONER

## 2024-08-05 PROCEDURE — 1123F ACP DISCUSS/DSCN MKR DOCD: CPT | Performed by: NURSE PRACTITIONER

## 2024-08-05 PROCEDURE — G2211 COMPLEX E/M VISIT ADD ON: HCPCS | Performed by: NURSE PRACTITIONER

## 2024-08-05 PROCEDURE — 3078F DIAST BP <80 MM HG: CPT | Performed by: NURSE PRACTITIONER

## 2024-08-05 PROCEDURE — 1036F TOBACCO NON-USER: CPT | Performed by: NURSE PRACTITIONER

## 2024-08-05 ASSESSMENT — SLEEP AND FATIGUE QUESTIONNAIRES
HOW LIKELY ARE YOU TO NOD OFF OR FALL ASLEEP WHILE WATCHING TV: MODERATE CHANCE OF DOZING
HOW LIKELY ARE YOU TO NOD OFF OR FALL ASLEEP WHILE SITTING INACTIVE IN A PUBLIC PLACE: SLIGHT CHANCE OF DOZING
HOW LIKELY ARE YOU TO NOD OFF OR FALL ASLEEP WHILE SITTING AND TALKING TO SOMEONE: MODERATE CHANCE OF DOZING
HOW LIKELY ARE YOU TO NOD OFF OR FALL ASLEEP WHILE SITTING AND READING: MODERATE CHANCE OF DOZING
HOW LIKELY ARE YOU TO NOD OFF OR FALL ASLEEP IN A CAR, WHILE STOPPED FOR A FEW MINUTES IN TRAFFIC: WOULD NEVER DOZE
ESS TOTAL SCORE: 13
HOW LIKELY ARE YOU TO NOD OFF OR FALL ASLEEP WHEN YOU ARE A PASSENGER IN A CAR FOR AN HOUR WITHOUT A BREAK: WOULD NEVER DOZE
HOW LIKELY ARE YOU TO NOD OFF OR FALL ASLEEP WHILE LYING DOWN TO REST IN THE AFTERNOON WHEN CIRCUMSTANCES PERMIT: HIGH CHANCE OF DOZING
HOW LIKELY ARE YOU TO NOD OFF OR FALL ASLEEP WHILE SITTING QUIETLY AFTER LUNCH WITHOUT ALCOHOL: HIGH CHANCE OF DOZING

## 2024-08-05 NOTE — PROGRESS NOTES
Pk Zaidi [facility]  [address]  [address]  [place] [address]  F- [phone]     [facility] PHYSICIANS [place] SPECIALTY CARE [facility] SLEEP MEDICINE  [address]  [address]  [facility] [address]  Dept: [phone]  Dept Fax: [phone]  Loc: [phone]      Assessment/Plan:      1. ODALYS (obstructive sleep apnea)  Assessment & Plan:  Chronic - Not stable: Reviewed and analyzed results of physiologic download from patient's machine and reviewed with patients. Supplies and parts as needed for the machine. These are medically necessary. Limit caffeine use after 3 PM. Based on the analyzed data, we will continue with current settings. Can consider his previous settings as his events were better controlled but given recent issues with aerophagia at the lower pressure, not sure if this is the best option. He also started to take muscle relaxer, which can be the reason why his events are elevated. He states he would like to work on cut down on dosages for muscle relaxer and opioid pain medication. For now we will continue with the current settings but we will pull the data in 3 weeks. If no improvement of his AHI, we will consider adjusting pressure settings and/or in lab titration study for further evaluation. He is on multiple medications that can cause daytime sleepiness. Encouraged him to start exercising as tolerated on a regular basis to help with weight and sleep apnea. He will return in 6 weeks for follow up. Encouraged him to use his machine each night, all night. Encouraged the patient to contact the office with any questions or concerns.    2. Atherosclerosis of native coronary artery of native heart without angina pectoris  Assessment & Plan:   Chronic- Stable.  Discussed the importance of treating sleep apnea as part of the management of this disorder.  Cont any meds per PCP and other physicians.    3. Chronic diastolic heart failure

## 2024-08-05 NOTE — TELEPHONE ENCOUNTER
Pt states he can not tolerate the increase of hydralazine. Causing sob and feeling weak feeling weak. Please call to advise.

## 2024-08-05 NOTE — ASSESSMENT & PLAN NOTE
Chronic - Not stable: Reviewed and analyzed results of physiologic download from patient's machine and reviewed with patients. Supplies and parts as needed for the machine. These are medically necessary. Limit caffeine use after 3 PM. Based on the analyzed data, we will continue with current settings. Can consider his previous settings as his events were better controlled but given recent issues with aerophagia at the lower pressure, not sure if this is the best option. He also started to take muscle relaxer, which can be the reason why his events are elevated. He states he would like to work on cut down on dosages for muscle relaxer and opioid pain medication. For now we will continue with the current settings but we will pull the data in 3 weeks. If no improvement of his AHI, we will consider adjusting pressure settings and/or in lab titration study for further evaluation. He is on multiple medications that can cause daytime sleepiness. Encouraged him to start exercising as tolerated on a regular basis to help with weight and sleep apnea. He will return in 6 weeks for follow up. Encouraged him to use his machine each night, all night. Encouraged the patient to contact the office with any questions or concerns.

## 2024-08-05 NOTE — TELEPHONE ENCOUNTER
Patient has not recorded BP over the last week with dose increase of Hydralazine. He took Hydralazine 75 mg this morning and BP was 156/60.     He feels weak and tired today. He has been in pain from his knee. Only has swelling in leg with knee issue. He is getting an injection tomorrow for that.    Appt scheduled with DENY  for 8/7 for elevated BP.

## 2024-08-06 NOTE — PROGRESS NOTES
vessel  LV-mid anterior-lateral HK, EF=50%  LVEDP=10        LIMA=>LAD patent  SVG=>RCA patent  Radial=>OM Patent     IMP:  3 vessel CAD with patent grafts and mild LV dysfunction  Lateral ischemia seen on stress possibley due to OM1  Vessel is small and he has limited symptoms so will treat medically    Assessment:      Diagnosis Orders     1. Primary hypertension   ~suboptimal : home cuff comparable to in office readings with recheck. Initial reading likely inaccurate   ~hydralazine 50 mg bid likely contributing to suboptimal readings. States to be intolerant to 75 mg bid feeling poorly  ~grade I DD on echo April '24   treated sleep apnea                  2.    Coronary artery disease without angina   ~episode of diaphoresis / nausea April ; trop mild flat elevation (at that time left AMA). He did not keep his myoview as scheduled    ~mild HK inf wall on echo April '24   ~denies CP ; continues to have episodes of diaphoresis   ~NM Feb '23 : lateral scar   ~s/p CABG '01   ~patent bypass conduits by cath '20; small caliber OM-1 with 70% stenosis with abnl NM findings of lateral wall ischemia   ~EF 55-60% on echo May '23; no regional wall motion abnl   ~exercise limited d/t back pain    1. Chronic diastolic heart failure (HCC)   ~stable / compensated  ~off diuretic / ace from April '24 d/t CECIL  ~grade I DD  Unable to use entresto / Jardiance due to cost. No Aldactone due to K+       4. PAF (paroxysmal atrial fibrillation) (HCC)   ~stable: AP regular  ~denies palpitations   ~diltiazem / DOAC prescribed by PCP  ~CHADsVASc 5  ~MCOT : neg AF burden         I had the opportunity to review the clinical symptoms and presentation of Eric Wang.   Plan:     Discussed taking midday dose of hydralazine 25 mg >> pt reluctant ; has not had lisinopril resumed  Continue to monitor BP  F/U as scheduled with Dr. Sandhu 9/24    Overall the patient is stable from CV standpoint    I have addresed the patient's cardiac risk factors

## 2024-08-07 ENCOUNTER — OFFICE VISIT (OUTPATIENT)
Dept: CARDIOLOGY CLINIC | Age: 79
End: 2024-08-07

## 2024-08-07 VITALS
HEART RATE: 73 BPM | HEIGHT: 65 IN | DIASTOLIC BLOOD PRESSURE: 61 MMHG | BODY MASS INDEX: 37.65 KG/M2 | WEIGHT: 226 LBS | SYSTOLIC BLOOD PRESSURE: 154 MMHG | OXYGEN SATURATION: 95 %

## 2024-08-07 DIAGNOSIS — I10 PRIMARY HYPERTENSION: ICD-10-CM

## 2024-08-07 DIAGNOSIS — I25.10 ATHEROSCLEROSIS OF NATIVE CORONARY ARTERY OF NATIVE HEART WITHOUT ANGINA PECTORIS: Chronic | ICD-10-CM

## 2024-08-07 DIAGNOSIS — I48.0 PAROXYSMAL ATRIAL FIBRILLATION (HCC): ICD-10-CM

## 2024-08-07 DIAGNOSIS — I50.32 CHRONIC DIASTOLIC HEART FAILURE (HCC): Primary | ICD-10-CM

## 2024-08-26 ENCOUNTER — OFFICE VISIT (OUTPATIENT)
Dept: ORTHOPEDIC SURGERY | Age: 79
End: 2024-08-26

## 2024-08-26 DIAGNOSIS — M17.11 PRIMARY OSTEOARTHRITIS OF RIGHT KNEE: Primary | ICD-10-CM

## 2024-08-26 DIAGNOSIS — M25.561 RIGHT KNEE PAIN, UNSPECIFIED CHRONICITY: ICD-10-CM

## 2024-08-26 RX ORDER — HYALURONATE SODIUM 10 MG/ML
20 SYRINGE (ML) INTRAARTICULAR ONCE
Status: COMPLETED | OUTPATIENT
Start: 2024-08-26 | End: 2024-08-26

## 2024-08-26 RX ADMIN — Medication 20 MG: at 15:43

## 2024-08-31 NOTE — PROGRESS NOTES
Patient: Eric Wang  : 1945    MRN: [id number]    Date of Visit: 24    Attending Physician: Kt Cox    History of Present Illness  Mr.. Wang is a very pleasant 78 y.o. patient with a several year history of progressive RIGHT knee pain. There is no precipitating event or trauma. The pain is located predominantly in the lateral aspect of the knee aggravated by weight bearing. Walking even short distances can be painful. Stair climbing is progressing becoming more difficult and painful. However, it can also awaken the patient at night. He has tried the following interventions without sustained functional improvement:    Low-impact, structured therapy/exercise program  NSAID's/Tylenol Arthritis strength  Crtisone injections  Knee brace  Cane for ambulation        PMH/PSH:  Past Medical History:   Diagnosis Date    Acute MI (HCC)     Back pain     CAD (coronary artery disease)     GERD (gastroesophageal reflux disease)     Hyperlipidemia     Hypertension     Sleep apnea      Patient Active Problem List   Diagnosis    HLD (hyperlipidemia)    Essential hypertension, benign    Coronary Artery Disease    ODALYS (obstructive sleep apnea)    Sebaceous cyst    Hyperkalemia    Abnormal cardiovascular stress test    Class 2 severe obesity due to excess calories with serious comorbidity and body mass index (BMI) of 37.0 to 37.9 in adult (HCC)    Mitral regurgitation    Chronic diastolic heart failure (HCC)    Bradycardia    Paroxysmal atrial fibrillation (HCC)    JUAN (acute kidney injury) (HCC)     Past Surgical History:   Procedure Laterality Date    BACK SURGERY      x2    CARDIAC SURGERY      3v--    CORONARY ARTERY BYPASS GRAFT      CYST REMOVAL N/A 1970    ON BASE OF SPINE    GALLBLADDER SURGERY      HAND SURGERY      left hand deep cut ? tendon repair    PROSTATE BIOPSY             MEDS:  Scheduled Meds:  Continuous Infusions:  PRN Meds:  Current Meds:  Current Outpatient Medications:  Vital Sign     Unable to Pay for Housing in the Last Year: No     Number of Places Lived in the Last Year: 1     Unstable Housing in the Last Year: No         Family History:   Cancer-related family history includes Cancer in his sister.      Review of Systems:  No personal history of DVT, PE. 12 point ROS otherwise negative other than reported in HPI.    Physical Examination:  Patient is alert and oriented x 3 and appears well nourished and appropriate for today's visit.  Height:   Ht Readings from Last 3 Encounters:   08/07/24 1.651 m (5' 5\")   08/05/24 1.651 m (5' 5\")   06/24/24 1.676 m (5' 5.98\")     Weight:   Wt Readings from Last 3 Encounters:   08/07/24 102.5 kg (226 lb)   08/05/24 102.6 kg (226 lb 3.2 oz)   06/24/24 106.1 kg (234 lb)     Gait: The patient walks with antalgic gait.  Right Knee: no effusion, + ITB             Ligaments stable to varus/valgus stress at full extension and 30 degrees.              AROM Right: 5-120 deg               + patellofemoral crepitus             + medial/lateral joint line tenderness     Radiographs: Standing AP/PA/lateral/Sunrise RIGHT Knee: Imaging was reviewed with the patient. There are severe degenerative changes of the RIGHT knee with joint space narrowing, subchondral sclerosis, and osteophyte formation. There is no radiographic evidence of AVN, fracture, or dislocation.   ??  Non-Operative Treatment      Assessment and Plan?: The patient has severe degenerative changes of the RIGHT knee     We discussed the diagnosis and treatment options in detail with the patient.  Patient may one day benefit from an elective total joint replacement however has not yet exhausted conservative treatment modalities  We have recommended non-steroidal anti-inflammatories, physical therapy, activity modification and weight loss.   Will proceed with gel injection       We provided the patient with an exercise sheet for knee arthritis. We emphasized the importance of strengthening the

## 2024-09-05 ENCOUNTER — HOSPITAL ENCOUNTER (OUTPATIENT)
Age: 79
Discharge: HOME | End: 2024-09-05
Payer: MEDICARE

## 2024-09-05 LAB — PSA SERPL DL<=0.01 NG/ML-MCNC: 7.5 NG/ML (ref 0–4)

## 2024-09-05 PROCEDURE — 84403 ASSAY OF TOTAL TESTOSTERONE: CPT

## 2024-09-05 PROCEDURE — 36415 COLL VENOUS BLD VENIPUNCTURE: CPT

## 2024-09-05 PROCEDURE — 84153 ASSAY OF PSA TOTAL: CPT

## 2024-09-06 LAB — TESTOST SERPL-MCNC: 988 NG/DL (ref 193–740)

## 2024-09-09 ENCOUNTER — OFFICE VISIT (OUTPATIENT)
Dept: ORTHOPEDIC SURGERY | Age: 79
End: 2024-09-09

## 2024-09-09 VITALS — WEIGHT: 226 LBS | BODY MASS INDEX: 37.65 KG/M2 | HEIGHT: 65 IN

## 2024-09-09 DIAGNOSIS — M17.11 PRIMARY OSTEOARTHRITIS OF RIGHT KNEE: Primary | ICD-10-CM

## 2024-09-09 RX ORDER — HYALURONATE SODIUM 10 MG/ML
20 SYRINGE (ML) INTRAARTICULAR ONCE
Status: COMPLETED | OUTPATIENT
Start: 2024-09-09 | End: 2024-09-09

## 2024-09-09 RX ADMIN — Medication 20 MG: at 15:39

## 2024-09-16 ENCOUNTER — OFFICE VISIT (OUTPATIENT)
Dept: ORTHOPEDIC SURGERY | Age: 79
End: 2024-09-16
Payer: MEDICARE

## 2024-09-16 VITALS
WEIGHT: 226 LBS | BODY MASS INDEX: 37.65 KG/M2 | HEIGHT: 65 IN | SYSTOLIC BLOOD PRESSURE: 138 MMHG | DIASTOLIC BLOOD PRESSURE: 61 MMHG

## 2024-09-16 DIAGNOSIS — M17.11 PRIMARY OSTEOARTHRITIS OF RIGHT KNEE: Primary | ICD-10-CM

## 2024-09-16 PROCEDURE — 20610 DRAIN/INJ JOINT/BURSA W/O US: CPT | Performed by: ORTHOPAEDIC SURGERY

## 2024-09-16 RX ORDER — HYALURONATE SODIUM 10 MG/ML
20 SYRINGE (ML) INTRAARTICULAR ONCE
Status: COMPLETED | OUTPATIENT
Start: 2024-09-16 | End: 2024-09-16

## 2024-09-16 RX ADMIN — Medication 20 MG: at 15:16

## 2024-09-24 ENCOUNTER — HOSPITAL ENCOUNTER (OUTPATIENT)
Age: 79
Discharge: HOME | End: 2024-09-24
Payer: MEDICARE

## 2024-09-24 ENCOUNTER — OFFICE VISIT (OUTPATIENT)
Dept: CARDIOLOGY CLINIC | Age: 79
End: 2024-09-24
Payer: MEDICARE

## 2024-09-24 VITALS
WEIGHT: 229 LBS | DIASTOLIC BLOOD PRESSURE: 72 MMHG | SYSTOLIC BLOOD PRESSURE: 136 MMHG | BODY MASS INDEX: 38.15 KG/M2 | OXYGEN SATURATION: 95 % | HEIGHT: 65 IN | HEART RATE: 75 BPM

## 2024-09-24 DIAGNOSIS — E66.01 CLASS 2 SEVERE OBESITY DUE TO EXCESS CALORIES WITH SERIOUS COMORBIDITY AND BODY MASS INDEX (BMI) OF 37.0 TO 37.9 IN ADULT: ICD-10-CM

## 2024-09-24 DIAGNOSIS — I34.0 NONRHEUMATIC MITRAL VALVE REGURGITATION: ICD-10-CM

## 2024-09-24 DIAGNOSIS — G47.33 OSA (OBSTRUCTIVE SLEEP APNEA): ICD-10-CM

## 2024-09-24 DIAGNOSIS — I48.0 PAROXYSMAL ATRIAL FIBRILLATION (HCC): ICD-10-CM

## 2024-09-24 DIAGNOSIS — I25.10 ATHEROSCLEROSIS OF NATIVE CORONARY ARTERY OF NATIVE HEART WITHOUT ANGINA PECTORIS: Primary | ICD-10-CM

## 2024-09-24 DIAGNOSIS — I25.10 ATHEROSCLEROSIS OF NATIVE CORONARY ARTERY OF NATIVE HEART WITHOUT ANGINA PECTORIS: ICD-10-CM

## 2024-09-24 DIAGNOSIS — I10 ESSENTIAL HYPERTENSION, BENIGN: ICD-10-CM

## 2024-09-24 LAB
ALBUMIN SERPL-MCNC: 3.9 G/DL (ref 3.4–5)
ANION GAP SERPL CALCULATED.3IONS-SCNC: 11 MMOL/L (ref 3–16)
BUN SERPL-MCNC: 24 MG/DL (ref 7–20)
CALCIUM SERPL-MCNC: 9.1 MG/DL (ref 8.3–10.6)
CHLORIDE SERPL-SCNC: 101 MMOL/L (ref 99–110)
CO2 SERPL-SCNC: 26 MMOL/L (ref 21–32)
CREAT SERPL-MCNC: 1.5 MG/DL (ref 0.8–1.3)
GFR SERPLBLD CREATININE-BSD FMLA CKD-EPI: 48 ML/MIN/{1.73_M2}
GLUCOSE SERPL-MCNC: 102 MG/DL (ref 70–99)
PHOSPHATE SERPL-MCNC: 2.8 MG/DL (ref 2.5–4.9)
POTASSIUM SERPL-SCNC: 4 MMOL/L (ref 3.5–5.1)
SODIUM SERPL-SCNC: 138 MMOL/L (ref 136–145)

## 2024-09-24 PROCEDURE — 3075F SYST BP GE 130 - 139MM HG: CPT | Performed by: INTERNAL MEDICINE

## 2024-09-24 PROCEDURE — 1123F ACP DISCUSS/DSCN MKR DOCD: CPT | Performed by: INTERNAL MEDICINE

## 2024-09-24 PROCEDURE — 1036F TOBACCO NON-USER: CPT | Performed by: INTERNAL MEDICINE

## 2024-09-24 PROCEDURE — 36415 COLL VENOUS BLD VENIPUNCTURE: CPT

## 2024-09-24 PROCEDURE — 3078F DIAST BP <80 MM HG: CPT | Performed by: INTERNAL MEDICINE

## 2024-09-24 PROCEDURE — 80069 RENAL FUNCTION PANEL: CPT

## 2024-09-24 PROCEDURE — G8417 CALC BMI ABV UP PARAM F/U: HCPCS | Performed by: INTERNAL MEDICINE

## 2024-09-24 PROCEDURE — 99214 OFFICE O/P EST MOD 30 MIN: CPT | Performed by: INTERNAL MEDICINE

## 2024-09-24 PROCEDURE — G8427 DOCREV CUR MEDS BY ELIG CLIN: HCPCS | Performed by: INTERNAL MEDICINE

## 2024-09-24 RX ORDER — LISINOPRIL 20 MG/1
20 TABLET ORAL DAILY
COMMUNITY
Start: 2024-08-19

## 2024-09-24 RX ORDER — ATORVASTATIN CALCIUM 10 MG/1
TABLET, FILM COATED ORAL
Qty: 90 TABLET | Refills: 4 | Status: CANCELLED | OUTPATIENT
Start: 2024-09-24

## 2024-09-24 RX ORDER — FUROSEMIDE 80 MG
80 TABLET ORAL DAILY
COMMUNITY
Start: 2024-07-08

## 2024-09-24 RX ORDER — METHOCARBAMOL 500 MG/1
500 TABLET, FILM COATED ORAL PRN
COMMUNITY

## 2024-09-24 RX ORDER — DILTIAZEM HYDROCHLORIDE 240 MG/1
240 CAPSULE, EXTENDED RELEASE ORAL DAILY
Qty: 90 CAPSULE | Refills: 4 | Status: CANCELLED | OUTPATIENT
Start: 2024-09-24

## 2024-09-24 RX ORDER — CELECOXIB 200 MG/1
200 CAPSULE ORAL DAILY
COMMUNITY

## 2024-09-25 ENCOUNTER — TELEPHONE (OUTPATIENT)
Dept: CARDIOLOGY CLINIC | Age: 79
End: 2024-09-25

## 2024-09-25 DIAGNOSIS — N28.9 RENAL INSUFFICIENCY: Primary | ICD-10-CM

## 2024-09-30 ENCOUNTER — OFFICE VISIT (OUTPATIENT)
Dept: PULMONOLOGY | Age: 79
End: 2024-09-30
Payer: MEDICARE

## 2024-09-30 VITALS
DIASTOLIC BLOOD PRESSURE: 74 MMHG | HEART RATE: 62 BPM | BODY MASS INDEX: 38.69 KG/M2 | SYSTOLIC BLOOD PRESSURE: 130 MMHG | WEIGHT: 232.2 LBS | OXYGEN SATURATION: 96 % | HEIGHT: 65 IN

## 2024-09-30 DIAGNOSIS — I50.32 CHRONIC DIASTOLIC HEART FAILURE (HCC): ICD-10-CM

## 2024-09-30 DIAGNOSIS — E66.01 CLASS 2 SEVERE OBESITY DUE TO EXCESS CALORIES WITH SERIOUS COMORBIDITY AND BODY MASS INDEX (BMI) OF 38.0 TO 38.9 IN ADULT: ICD-10-CM

## 2024-09-30 DIAGNOSIS — I48.0 PAROXYSMAL ATRIAL FIBRILLATION (HCC): ICD-10-CM

## 2024-09-30 DIAGNOSIS — I10 ESSENTIAL HYPERTENSION, BENIGN: ICD-10-CM

## 2024-09-30 DIAGNOSIS — G47.33 OSA (OBSTRUCTIVE SLEEP APNEA): Primary | ICD-10-CM

## 2024-09-30 DIAGNOSIS — I25.10 ATHEROSCLEROSIS OF NATIVE CORONARY ARTERY OF NATIVE HEART WITHOUT ANGINA PECTORIS: ICD-10-CM

## 2024-09-30 DIAGNOSIS — E66.812 CLASS 2 SEVERE OBESITY DUE TO EXCESS CALORIES WITH SERIOUS COMORBIDITY AND BODY MASS INDEX (BMI) OF 38.0 TO 38.9 IN ADULT: ICD-10-CM

## 2024-09-30 PROCEDURE — G8427 DOCREV CUR MEDS BY ELIG CLIN: HCPCS | Performed by: NURSE PRACTITIONER

## 2024-09-30 PROCEDURE — 99214 OFFICE O/P EST MOD 30 MIN: CPT | Performed by: NURSE PRACTITIONER

## 2024-09-30 PROCEDURE — 1123F ACP DISCUSS/DSCN MKR DOCD: CPT | Performed by: NURSE PRACTITIONER

## 2024-09-30 PROCEDURE — 3075F SYST BP GE 130 - 139MM HG: CPT | Performed by: NURSE PRACTITIONER

## 2024-09-30 PROCEDURE — 1036F TOBACCO NON-USER: CPT | Performed by: NURSE PRACTITIONER

## 2024-09-30 PROCEDURE — G2211 COMPLEX E/M VISIT ADD ON: HCPCS | Performed by: NURSE PRACTITIONER

## 2024-09-30 PROCEDURE — 3078F DIAST BP <80 MM HG: CPT | Performed by: NURSE PRACTITIONER

## 2024-09-30 PROCEDURE — G8417 CALC BMI ABV UP PARAM F/U: HCPCS | Performed by: NURSE PRACTITIONER

## 2024-09-30 ASSESSMENT — SLEEP AND FATIGUE QUESTIONNAIRES
HOW LIKELY ARE YOU TO NOD OFF OR FALL ASLEEP WHILE WATCHING TV: SLIGHT CHANCE OF DOZING
HOW LIKELY ARE YOU TO NOD OFF OR FALL ASLEEP WHEN YOU ARE A PASSENGER IN A CAR FOR AN HOUR WITHOUT A BREAK: SLIGHT CHANCE OF DOZING
HOW LIKELY ARE YOU TO NOD OFF OR FALL ASLEEP WHILE SITTING AND TALKING TO SOMEONE: SLIGHT CHANCE OF DOZING
HOW LIKELY ARE YOU TO NOD OFF OR FALL ASLEEP WHILE SITTING AND READING: SLIGHT CHANCE OF DOZING
HOW LIKELY ARE YOU TO NOD OFF OR FALL ASLEEP WHILE SITTING INACTIVE IN A PUBLIC PLACE: SLIGHT CHANCE OF DOZING
HOW LIKELY ARE YOU TO NOD OFF OR FALL ASLEEP WHILE LYING DOWN TO REST IN THE AFTERNOON WHEN CIRCUMSTANCES PERMIT: HIGH CHANCE OF DOZING
ESS TOTAL SCORE: 10
HOW LIKELY ARE YOU TO NOD OFF OR FALL ASLEEP WHILE SITTING QUIETLY AFTER LUNCH WITHOUT ALCOHOL: MODERATE CHANCE OF DOZING
HOW LIKELY ARE YOU TO NOD OFF OR FALL ASLEEP IN A CAR, WHILE STOPPED FOR A FEW MINUTES IN TRAFFIC: WOULD NEVER DOZE

## 2024-09-30 NOTE — ASSESSMENT & PLAN NOTE
Chronic- Stable.  Discussed the importance of treating sleep apnea as part of the management of this disorder.  Cont any meds per PCP and other physicians.

## 2024-09-30 NOTE — ASSESSMENT & PLAN NOTE
Chronic - Not stable: Reviewed and analyzed results of physiologic download from patient's machine and reviewed with patients. Supplies and parts as needed for the machine. These are medically necessary. Limit caffeine use after 3 PM. Based on the analyzed data, we will order in lab titration study for further evaluation. Despite consistent use of the machine with a well fitting mask, his AHI remains elevated and over 8/hr, and continues to struggle with non-restorative sleep and daytime sleepiness. He also gained over 15 lbs over the past few years. He will need in lab titration study to see how much pressure he requires to adequately control his sleep apnea. Once the study is done, we will call him to review results. He will need to return in 4-6 weeks after the study for follow up. Also discussed that some of his medications can contribute to his daytime sleepiness, and he may have some residual daytime sleepiness even after the titration study. Discussed limiting naps to no more than 30 min as it can affect his night time sleep. Encouraged him to continue to use his machine each night, all night. Orders:    Sleep Study with PAP Titration; Future

## 2024-09-30 NOTE — ASSESSMENT & PLAN NOTE
Chronic-not stable:  Discussed importance of treating obstructive sleep apnea and getting sufficient sleep to assist with weight control.  Encouraged him to work on weight loss through diet and exercise. Recommended DASH or Mediterranean diets.

## 2024-09-30 NOTE — PROGRESS NOTES
DAILY    UNABLE TO FIND circulation and vein support         Objective   Vitals:  Weight BMI   Wt Readings from Last 3 Encounters:   09/30/24 105.3 kg (232 lb 3.2 oz)   09/24/24 103.9 kg (229 lb)   09/16/24 102.5 kg (226 lb)    Body mass index is 38.64 kg/m².     BP HR SaO2   BP Readings from Last 3 Encounters:   09/30/24 130/74   09/24/24 136/72   09/16/24 138/61    Pulse Readings from Last 3 Encounters:   09/30/24 62   09/24/24 75   08/07/24 73    SpO2 Readings from Last 3 Encounters:   09/30/24 96%   09/24/24 95%   08/07/24 95%         Electronically signed by NEO Sheikh CNP on 9/30/2024 at 5:30 PM

## 2024-10-15 ENCOUNTER — HOSPITAL ENCOUNTER (OUTPATIENT)
Dept: SLEEP CENTER | Age: 79
Discharge: HOME | End: 2024-10-15
Payer: MEDICARE

## 2024-10-15 DIAGNOSIS — G47.33 OSA (OBSTRUCTIVE SLEEP APNEA): ICD-10-CM

## 2024-10-15 PROCEDURE — 95811 POLYSOM 6/>YRS CPAP 4/> PARM: CPT

## 2024-10-18 NOTE — PROCEDURES
PROCEDURE NOTE  Date: 10/18/2024   Name: Eric Wang  YOB: 1945    Procedures                Electronically signed by SOMMER BERGER MD on 10/18/24 at 3:40 PM EDT

## 2024-10-21 ENCOUNTER — TELEPHONE (OUTPATIENT)
Dept: PULMONOLOGY | Age: 79
End: 2024-10-21

## 2024-10-21 NOTE — TELEPHONE ENCOUNTER
Pt returning call to review titration study results.  Results were made via modem.  Pt to schedule f/u and call if he has any issues with the pressure.

## 2024-11-08 ENCOUNTER — OFFICE VISIT (OUTPATIENT)
Dept: ORTHOPEDIC SURGERY | Age: 79
End: 2024-11-08
Payer: MEDICARE

## 2024-11-08 VITALS — BODY MASS INDEX: 38.65 KG/M2 | WEIGHT: 232 LBS | HEIGHT: 65 IN

## 2024-11-08 DIAGNOSIS — M17.11 PRIMARY OSTEOARTHRITIS OF RIGHT KNEE: ICD-10-CM

## 2024-11-08 DIAGNOSIS — M25.511 RIGHT SHOULDER PAIN, UNSPECIFIED CHRONICITY: Primary | ICD-10-CM

## 2024-11-08 DIAGNOSIS — M25.561 RIGHT KNEE PAIN, UNSPECIFIED CHRONICITY: ICD-10-CM

## 2024-11-08 DIAGNOSIS — M25.811 IMPINGEMENT OF RIGHT SHOULDER: ICD-10-CM

## 2024-11-08 PROCEDURE — 20610 DRAIN/INJ JOINT/BURSA W/O US: CPT | Performed by: PHYSICIAN ASSISTANT

## 2024-11-08 PROCEDURE — G8484 FLU IMMUNIZE NO ADMIN: HCPCS | Performed by: PHYSICIAN ASSISTANT

## 2024-11-08 PROCEDURE — G8427 DOCREV CUR MEDS BY ELIG CLIN: HCPCS | Performed by: PHYSICIAN ASSISTANT

## 2024-11-08 PROCEDURE — G8417 CALC BMI ABV UP PARAM F/U: HCPCS | Performed by: PHYSICIAN ASSISTANT

## 2024-11-08 RX ORDER — LIDOCAINE HYDROCHLORIDE 10 MG/ML
4 INJECTION, SOLUTION INFILTRATION; PERINEURAL ONCE
Status: COMPLETED | OUTPATIENT
Start: 2024-11-08 | End: 2024-11-08

## 2024-11-08 RX ORDER — TRIAMCINOLONE ACETONIDE 40 MG/ML
40 INJECTION, SUSPENSION INTRA-ARTICULAR; INTRAMUSCULAR ONCE
Status: COMPLETED | OUTPATIENT
Start: 2024-11-08 | End: 2024-11-08

## 2024-11-08 RX ADMIN — TRIAMCINOLONE ACETONIDE 40 MG: 40 INJECTION, SUSPENSION INTRA-ARTICULAR; INTRAMUSCULAR at 15:39

## 2024-11-08 RX ADMIN — LIDOCAINE HYDROCHLORIDE 4 ML: 10 INJECTION, SOLUTION INFILTRATION; PERINEURAL at 15:38

## 2024-11-11 NOTE — PROGRESS NOTES
kidney injury) (HCC)     Past Surgical History:   Procedure Laterality Date    BACK SURGERY      x2    CARDIAC SURGERY      3v--2001    CORONARY ARTERY BYPASS GRAFT      CYST REMOVAL N/A 1970    ON BASE OF SPINE    GALLBLADDER SURGERY      HAND SURGERY      left hand deep cut ? tendon repair    PROSTATE BIOPSY  2020           MEDS:  Scheduled Meds:  Continuous Infusions:  PRN Meds:  Current Meds:  Current Outpatient Medications:     celecoxib (CELEBREX) 200 MG capsule, Take 1 capsule by mouth daily, Disp: , Rfl:     furosemide (LASIX) 80 MG tablet, 1 tablet daily, Disp: , Rfl:     lisinopril (PRINIVIL;ZESTRIL) 20 MG tablet, 1 tablet daily, Disp: , Rfl:     methocarbamol (ROBAXIN) 500 MG tablet, 1 tablet as needed, Disp: , Rfl:     hydrALAZINE (APRESOLINE) 50 MG tablet, Take 1.5 tablets by mouth 2 times daily (Patient taking differently: Take 1 tablet by mouth once), Disp: 270 tablet, Rfl: 0    dilTIAZem (TIAZAC) 240 MG extended release capsule, Take 1 capsule by mouth daily, Disp: 90 capsule, Rfl: 4    apixaban (ELIQUIS) 5 MG TABS tablet, Take 1 tablet by mouth 2 times daily, Disp: , Rfl:     atorvastatin (LIPITOR) 10 MG tablet, TAKE 1 TABLET BY MOUTH ONE TIME A DAY, Disp: 90 tablet, Rfl: 4    terazosin (HYTRIN) 1 MG capsule, Take 1 capsule by mouth nightly, Disp: , Rfl:     Turmeric 400 MG CAPS, Take 1 capsule by mouth Daily, Disp: , Rfl:     CRANBERRY EXTRACT PO, Take 1 capsule by mouth Daily, Disp: , Rfl:     Resveratrol 100 MG CAPS, Take 1 capsule by mouth daily, Disp: , Rfl:     Probiotic Product (PROBIOTIC ADVANCED PO), Take 1 capsule by mouth Daily, Disp: , Rfl:     ACETYLCARNITINE HCL PO, Take 1 tablet by mouth Daily, Disp: , Rfl:     UNABLE TO FIND, circulation and vein support, Disp: , Rfl:     fexofenadine (ALLEGRA) 180 MG tablet, Take 1 tablet by mouth daily, Disp: , Rfl:     Cholecalciferol (VITAMIN D3) 5000 UNITS TABS, Take 1 tablet by mouth daily, Disp: , Rfl:     oxyCODONE (ROXICODONE) 20 MG

## 2024-11-14 ENCOUNTER — HOSPITAL ENCOUNTER (OUTPATIENT)
Age: 79
Discharge: HOME | End: 2024-11-14
Payer: MEDICARE

## 2024-11-14 LAB
ALBUMIN SERPL-MCNC: 4.2 G/DL (ref 3.4–5)
ALBUMIN/GLOB SERPL: 1.8 {RATIO} (ref 1.1–2.2)
ALP SERPL-CCNC: 60 U/L (ref 40–129)
ALT SERPL-CCNC: 36 U/L (ref 10–40)
ANION GAP SERPL CALCULATED.3IONS-SCNC: 13 MMOL/L (ref 3–16)
AST SERPL-CCNC: 27 U/L (ref 15–37)
BILIRUB SERPL-MCNC: 0.8 MG/DL (ref 0–1)
BUN SERPL-MCNC: 32 MG/DL (ref 7–20)
CALCIUM SERPL-MCNC: 9.1 MG/DL (ref 8.3–10.6)
CHLORIDE SERPL-SCNC: 102 MMOL/L (ref 99–110)
CHOLEST SERPL-MCNC: 126 MG/DL (ref 0–199)
CO2 SERPL-SCNC: 22 MMOL/L (ref 21–32)
CREAT SERPL-MCNC: 1.4 MG/DL (ref 0.8–1.3)
EST. AVERAGE GLUCOSE BLD GHB EST-MCNC: 128.4 MG/DL
GFR SERPLBLD CREATININE-BSD FMLA CKD-EPI: 52 ML/MIN/{1.73_M2}
GLUCOSE SERPL-MCNC: 111 MG/DL (ref 70–99)
HBA1C MFR BLD: 6.1 %
HDLC SERPL-MCNC: 68 MG/DL (ref 40–60)
LDLC SERPL CALC-MCNC: 52 MG/DL
POTASSIUM SERPL-SCNC: 4.6 MMOL/L (ref 3.5–5.1)
PROT SERPL-MCNC: 6.5 G/DL (ref 6.4–8.2)
SODIUM SERPL-SCNC: 137 MMOL/L (ref 136–145)
TRIGL SERPL-MCNC: 31 MG/DL (ref 0–150)
VLDLC SERPL CALC-MCNC: 6 MG/DL

## 2024-11-14 PROCEDURE — 80061 LIPID PANEL: CPT

## 2024-11-14 PROCEDURE — 84270 ASSAY OF SEX HORMONE GLOBUL: CPT

## 2024-11-14 PROCEDURE — 84403 ASSAY OF TOTAL TESTOSTERONE: CPT

## 2024-11-14 PROCEDURE — 83036 HEMOGLOBIN GLYCOSYLATED A1C: CPT

## 2024-11-14 PROCEDURE — 36415 COLL VENOUS BLD VENIPUNCTURE: CPT

## 2024-11-14 PROCEDURE — 80053 COMPREHEN METABOLIC PANEL: CPT

## 2024-11-19 LAB
SHBG SERPL-SCNC: 38 NMOL/L (ref 19–76)
TESTOST FREE SERPL-MCNC: 73.8 PG/ML (ref 47–244)
TESTOST SERPL-MCNC: 396 NG/DL (ref 193–740)

## 2024-12-26 ENCOUNTER — TELEPHONE (OUTPATIENT)
Dept: CARDIOLOGY CLINIC | Age: 79
End: 2024-12-26

## 2024-12-26 RX ORDER — TERAZOSIN 5 MG/1
5 CAPSULE ORAL NIGHTLY
Qty: 30 CAPSULE | Refills: 3 | Status: SHIPPED | OUTPATIENT
Start: 2024-12-26

## 2024-12-26 NOTE — TELEPHONE ENCOUNTER
Patient called with high blood pressure readings, see telephone encounter from 12/26/24. Dr Clemente is increasing Hytrin to 5mg nightly. Patient requested the rx go to [facility].

## 2024-12-26 NOTE — TELEPHONE ENCOUNTER
Spoke w/ pt. He checked his b/p this morning around 11am when he woke up and it was 149/70. After he took a shower it was 148/57. Last night at 11pm, b/p was 175/?. He checks his b/p with an arm cuff. He has noticed he is more short of breath when active. This has been ongoing for a while. Last week he states he took his morning meds later than usual, was closer to pm meds and he developed dizziness and vomited. He has recovered from that. He states he takes his meds at 12n and 12am. He states his sleeping schedule is off due to sleep apnea. He states he is using c-pap nightly. I offered an appt with NPTS tomorrow, he states that due to sleep apnea he can only come for afternoon appts.     Reports Hydralazine is 50mg BID, Lisinopril 20mg QD, Diltiazem 120mg BID, and Hytrin 1mg QD.     He states he cannot take more Hydralazine, it caused a side effect but he is unsure what. He states he can't remember what the medication did to him.     Per Dr Clemente>increase Hytrin to 5mg nightly.     I relayed instructions to Mr Wang. He verbalized understanding. He asked that the rx be sent to [facility]. I offered for pt to come see NPTS next week on 1/2/25. He cannot come in that day. He will call back with blood pressure readings after increasing Hytrin.

## 2024-12-26 NOTE — TELEPHONE ENCOUNTER
Blood Pressure Problems:    Hypertension (High BP)    1.What are your BP readings within the last week?  Per BP cuff averaging 157/69  12/25 it was 175/diastolic was fine he stated  12/26 149/6? Then he re-took it after a shower it was 148/68  2.What bloodpressure medications are you taking:  Dose  Frequency  3. What symptoms are you experiencing?  Do you have dizziness when standing?  One episode last week where he was dizzy and sick to his stomach  Headache?  Blurred vision?  SOB? Increasing SOB  CP?  4. How long have had these symptoms?    Please advise.

## 2024-12-30 NOTE — TELEPHONE ENCOUNTER
[facility] faxing clarification on increase in Hytrin dose. Per [facility]- fill Hytrin 5 mg nightly script. Form signed and faxed back to [facility].

## 2025-01-20 ENCOUNTER — OFFICE VISIT (OUTPATIENT)
Dept: PULMONOLOGY | Age: 80
End: 2025-01-20
Payer: MEDICARE

## 2025-01-20 VITALS
SYSTOLIC BLOOD PRESSURE: 128 MMHG | DIASTOLIC BLOOD PRESSURE: 64 MMHG | OXYGEN SATURATION: 95 % | HEART RATE: 82 BPM | BODY MASS INDEX: 37.12 KG/M2 | HEIGHT: 65 IN | WEIGHT: 222.8 LBS

## 2025-01-20 DIAGNOSIS — I25.10 ATHEROSCLEROSIS OF NATIVE CORONARY ARTERY OF NATIVE HEART WITHOUT ANGINA PECTORIS: ICD-10-CM

## 2025-01-20 DIAGNOSIS — I48.0 PAROXYSMAL ATRIAL FIBRILLATION (HCC): ICD-10-CM

## 2025-01-20 DIAGNOSIS — G47.33 OSA (OBSTRUCTIVE SLEEP APNEA): Primary | ICD-10-CM

## 2025-01-20 DIAGNOSIS — E66.01 CLASS 2 SEVERE OBESITY DUE TO EXCESS CALORIES WITH SERIOUS COMORBIDITY AND BODY MASS INDEX (BMI) OF 37.0 TO 37.9 IN ADULT: ICD-10-CM

## 2025-01-20 DIAGNOSIS — I10 ESSENTIAL HYPERTENSION, BENIGN: ICD-10-CM

## 2025-01-20 DIAGNOSIS — E66.812 CLASS 2 SEVERE OBESITY DUE TO EXCESS CALORIES WITH SERIOUS COMORBIDITY AND BODY MASS INDEX (BMI) OF 37.0 TO 37.9 IN ADULT: ICD-10-CM

## 2025-01-20 DIAGNOSIS — I50.32 CHRONIC DIASTOLIC HEART FAILURE (HCC): ICD-10-CM

## 2025-01-20 PROCEDURE — 1123F ACP DISCUSS/DSCN MKR DOCD: CPT | Performed by: NURSE PRACTITIONER

## 2025-01-20 PROCEDURE — G2211 COMPLEX E/M VISIT ADD ON: HCPCS | Performed by: NURSE PRACTITIONER

## 2025-01-20 PROCEDURE — 3078F DIAST BP <80 MM HG: CPT | Performed by: NURSE PRACTITIONER

## 2025-01-20 PROCEDURE — 1160F RVW MEDS BY RX/DR IN RCRD: CPT | Performed by: NURSE PRACTITIONER

## 2025-01-20 PROCEDURE — 3074F SYST BP LT 130 MM HG: CPT | Performed by: NURSE PRACTITIONER

## 2025-01-20 PROCEDURE — 99214 OFFICE O/P EST MOD 30 MIN: CPT | Performed by: NURSE PRACTITIONER

## 2025-01-20 PROCEDURE — 1159F MED LIST DOCD IN RCRD: CPT | Performed by: NURSE PRACTITIONER

## 2025-01-20 PROCEDURE — G8427 DOCREV CUR MEDS BY ELIG CLIN: HCPCS | Performed by: NURSE PRACTITIONER

## 2025-01-20 PROCEDURE — 1036F TOBACCO NON-USER: CPT | Performed by: NURSE PRACTITIONER

## 2025-01-20 PROCEDURE — G8417 CALC BMI ABV UP PARAM F/U: HCPCS | Performed by: NURSE PRACTITIONER

## 2025-01-20 ASSESSMENT — SLEEP AND FATIGUE QUESTIONNAIRES
HOW LIKELY ARE YOU TO NOD OFF OR FALL ASLEEP WHEN YOU ARE A PASSENGER IN A CAR FOR AN HOUR WITHOUT A BREAK: MODERATE CHANCE OF DOZING
HOW LIKELY ARE YOU TO NOD OFF OR FALL ASLEEP WHILE LYING DOWN TO REST IN THE AFTERNOON WHEN CIRCUMSTANCES PERMIT: MODERATE CHANCE OF DOZING
ESS TOTAL SCORE: 11
HOW LIKELY ARE YOU TO NOD OFF OR FALL ASLEEP IN A CAR, WHILE STOPPED FOR A FEW MINUTES IN TRAFFIC: SLIGHT CHANCE OF DOZING
HOW LIKELY ARE YOU TO NOD OFF OR FALL ASLEEP WHILE SITTING AND READING: SLIGHT CHANCE OF DOZING
HOW LIKELY ARE YOU TO NOD OFF OR FALL ASLEEP WHILE SITTING AND TALKING TO SOMEONE: SLIGHT CHANCE OF DOZING
HOW LIKELY ARE YOU TO NOD OFF OR FALL ASLEEP WHILE SITTING QUIETLY AFTER LUNCH WITHOUT ALCOHOL: MODERATE CHANCE OF DOZING
HOW LIKELY ARE YOU TO NOD OFF OR FALL ASLEEP WHILE WATCHING TV: SLIGHT CHANCE OF DOZING

## 2025-01-20 NOTE — PROGRESS NOTES
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) [] CSA (G47.31) [] Apnea (G47.30)   Length of Need: [x] 18 Months [] 99 Months [] Other:   Machine (LAYO!): [] Respironics Dream Station   2   Auto [] ResMed AirSense     Auto 11 [] Other:     []  CPAP () [] Bilevel ()   Mode: [] Auto [] Spontaneous    Mode: [] Auto [] Spontaneous             Comfort Settings:      Humidifier: [] Heated ()        [x] Water chamber replacement ()/ 1 per 6 months        Mask:   [] Nasal () /1 per 3 months [x] Full Face () /1 per 3 months   [] Patient choice -Size and fit mask [x] Patient Choice - Size and fit mask   [] Dispense: [] Dispense:   [] Headgear () / 1 per 3 months [x] Headgear () / 1 per 3 months   [] Replacement Nasal Cushion ()/2 per month [x] Interface Replacement ()/1 per month   [] Replacement Nasal Pillows ()/2 per month         Tubing: [x] Heated ()/1 per 3 months    [] Standard ()/1 per 3 months [] Other:           Filters: [x] Non-disposable ()/1 per 6 months     [x] Ultra-Fine, Disposable ()/2 per month        Miscellaneous: [] Chin Strap ()/ 1 per 6 months [] O2 bleed-in:        LPM   [] Oxymetry on CPAP/Bilevel [x]  Other: Modem: ()         Start Order Date: 25    MEDICAL JUSTIFICATION:  I, the undersigned, certify that the above prescribed supplies are medically necessary for this patient’s wellbeing.  In my opinion, the supplies are both reasonable and necessary in reference to accepted standards of medicalpractice in treatment of this patient’s condition.    Shauna Zaidi CNP    NPI: [id number]     Order Signed Date: 25    Eric Wang  1945  [address] [place] [address]  [phone] (home)   [phone] (mobile)      Insurance Info (confirm with patient if correct):  Payer/Plan Subscr  Sex Relation Sub. Ins. ID Effective Group Num

## 2025-01-20 NOTE — ASSESSMENT & PLAN NOTE
Chronic - Stable: Reviewed results of BIPAP titration study with the patient. Reviewed and analyzed results of physiologic download from patient's machine and reviewed with patients. Supplies and parts as needed for the machine. These are medically necessary. Limit caffeine use after 3 PM. Based on the analyzed data, we will continue with current settings. Stable on the current settings and he is getting benefit from using the machine. Multiple factors are affecting his daytime symptoms, including chronic pain, chronic medical conditions, some of his medications, and likely depression. Discussed trial of a medication to manage depression symptoms but he does not want to take additional medication as he is already on many medications. Discussed trial of AirTouch F20 instead of AirFit F20 to see if it helps better with leak and for comfort. Showed him how to use Run Mask Fit under My Option so he can check the fit before he goes to bed. Will send a prescription in case he wants to try AirTouch F20. Will continue with the current settings since he is able to tolerate the current settings, but can consider making slight adjustment if he continues to have mask leak. No driving if sleepy or drowsy. He will return in 3-4 mo for follow up. Instructed him to return sooner or contact the office if experiences new or worsening of symptoms. Encouraged him to continue to use his machine each night, all night.

## 2025-01-20 NOTE — PROGRESS NOTES
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) [] CSA (G47.31) [] Apnea (G47.30)   Length of Need: [x] 18 Months [] 99 Months [] Other:   Machine (LAYO!): [] Respironics Dream Station   2   Auto [] ResMed AirSense     Auto 11 [] Other:     []  CPAP () [] Bilevel ()   Mode: [] Auto [] Spontaneous    Mode: [] Auto [] Spontaneous             Comfort Settings:      Humidifier: [] Heated ()        [x] Water chamber replacement ()/ 1 per 6 months        Mask:   [] Nasal () /1 per 3 months [x] Full Face () /1 per 3 months   [] Patient choice -Size and fit mask [x] Patient Choice - Size and fit mask   [] Dispense: [] Dispense:   [] Headgear () / 1 per 3 months [x] Headgear () / 1 per 3 months   [] Replacement Nasal Cushion ()/2 per month [x] Interface Replacement ()/1 per month   [] Replacement Nasal Pillows ()/2 per month         Tubing: [x] Heated ()/1 per 3 months    [] Standard ()/1 per 3 months [] Other:           Filters: [x] Non-disposable ()/1 per 6 months     [x] Ultra-Fine, Disposable ()/2 per month        Miscellaneous: [] Chin Strap ()/ 1 per 6 months [] O2 bleed-in:        LPM   [] Oxymetry on CPAP/Bilevel [x]  Other: Modem: ()         Start Order Date: 25    MEDICAL JUSTIFICATION:  I, the undersigned, certify that the above prescribed supplies are medically necessary for this patient’s wellbeing.  In my opinion, the supplies are both reasonable and necessary in reference to accepted standards of medicalpractice in treatment of this patient’s condition.    Shauna Zaidi CNP    NPI: [id number]     Order Signed Date: 25    Eric Wang  1945  [address] [place] [address]  [phone] (home)   [phone] (mobile)      Insurance Info (confirm with patient if correct):  Payer/Plan Subscr  Sex Relation Sub. Ins. ID Effective Group Num

## 2025-02-14 ENCOUNTER — OFFICE VISIT (OUTPATIENT)
Dept: ORTHOPEDIC SURGERY | Age: 80
End: 2025-02-14

## 2025-02-14 DIAGNOSIS — M25.511 RIGHT SHOULDER PAIN, UNSPECIFIED CHRONICITY: Primary | ICD-10-CM

## 2025-02-14 DIAGNOSIS — M25.561 RIGHT KNEE PAIN, UNSPECIFIED CHRONICITY: ICD-10-CM

## 2025-02-14 DIAGNOSIS — M79.641 RIGHT HAND PAIN: ICD-10-CM

## 2025-02-14 RX ORDER — TRIAMCINOLONE ACETONIDE 40 MG/ML
40 INJECTION, SUSPENSION INTRA-ARTICULAR; INTRAMUSCULAR ONCE
Status: COMPLETED | OUTPATIENT
Start: 2025-02-14 | End: 2025-02-14

## 2025-02-14 RX ORDER — LIDOCAINE HYDROCHLORIDE 10 MG/ML
4 INJECTION, SOLUTION INFILTRATION; PERINEURAL ONCE
Status: COMPLETED | OUTPATIENT
Start: 2025-02-14 | End: 2025-02-14

## 2025-02-14 RX ADMIN — TRIAMCINOLONE ACETONIDE 40 MG: 40 INJECTION, SUSPENSION INTRA-ARTICULAR; INTRAMUSCULAR at 14:23

## 2025-02-14 RX ADMIN — LIDOCAINE HYDROCHLORIDE 4 ML: 10 INJECTION, SOLUTION INFILTRATION; PERINEURAL at 14:08

## 2025-02-14 RX ADMIN — LIDOCAINE HYDROCHLORIDE 4 ML: 10 INJECTION, SOLUTION INFILTRATION; PERINEURAL at 14:23

## 2025-02-14 RX ADMIN — TRIAMCINOLONE ACETONIDE 40 MG: 40 INJECTION, SUSPENSION INTRA-ARTICULAR; INTRAMUSCULAR at 14:08

## 2025-02-14 NOTE — PROGRESS NOTES
without adverse reaction. The patient was counseled on potential reactions to the injection and given information on follow up and when to seek medical attention. The patient will monitor how long relief persists.    Joint Injection: risks and benefits were discussed with the patient, after preparation of the injection site with alcohol, a combination of 1cc kenelog and 4cc lidocaine totaling 5 cc was injected into the right subacromial space for impingement. The procedure was tolerated well without adverse reaction. The patient was counseled on potential reactions to the injection and given information on follow up and when to seek medical attention. The patient will monitor how long relief persists.    F/u prn    ?   ??cc: Kimo Francois MD

## 2025-03-04 ENCOUNTER — OFFICE VISIT (OUTPATIENT)
Dept: CARDIOLOGY CLINIC | Age: 80
End: 2025-03-04
Payer: MEDICARE

## 2025-03-04 VITALS
BODY MASS INDEX: 37.32 KG/M2 | HEART RATE: 71 BPM | OXYGEN SATURATION: 95 % | WEIGHT: 224 LBS | SYSTOLIC BLOOD PRESSURE: 132 MMHG | DIASTOLIC BLOOD PRESSURE: 64 MMHG | HEIGHT: 65 IN

## 2025-03-04 DIAGNOSIS — E78.49 OTHER HYPERLIPIDEMIA: Chronic | ICD-10-CM

## 2025-03-04 DIAGNOSIS — I50.32 CHRONIC DIASTOLIC HEART FAILURE (HCC): Primary | ICD-10-CM

## 2025-03-04 DIAGNOSIS — I25.10 ATHEROSCLEROSIS OF NATIVE CORONARY ARTERY OF NATIVE HEART WITHOUT ANGINA PECTORIS: Chronic | ICD-10-CM

## 2025-03-04 DIAGNOSIS — I10 ESSENTIAL HYPERTENSION, BENIGN: Chronic | ICD-10-CM

## 2025-03-04 PROCEDURE — 99214 OFFICE O/P EST MOD 30 MIN: CPT | Performed by: INTERNAL MEDICINE

## 2025-03-04 PROCEDURE — 1159F MED LIST DOCD IN RCRD: CPT | Performed by: INTERNAL MEDICINE

## 2025-03-04 PROCEDURE — 1036F TOBACCO NON-USER: CPT | Performed by: INTERNAL MEDICINE

## 2025-03-04 PROCEDURE — 1123F ACP DISCUSS/DSCN MKR DOCD: CPT | Performed by: INTERNAL MEDICINE

## 2025-03-04 PROCEDURE — 3078F DIAST BP <80 MM HG: CPT | Performed by: INTERNAL MEDICINE

## 2025-03-04 PROCEDURE — G8417 CALC BMI ABV UP PARAM F/U: HCPCS | Performed by: INTERNAL MEDICINE

## 2025-03-04 PROCEDURE — 3075F SYST BP GE 130 - 139MM HG: CPT | Performed by: INTERNAL MEDICINE

## 2025-03-04 PROCEDURE — G8427 DOCREV CUR MEDS BY ELIG CLIN: HCPCS | Performed by: INTERNAL MEDICINE

## 2025-03-04 NOTE — PROGRESS NOTES
vein support Yes Rj Celaya MD   fexofenadine (ALLEGRA) 180 MG tablet Take 1 tablet by mouth daily Yes Rj Celaya MD   Cholecalciferol (VITAMIN D3) 5000 UNITS TABS Take 1 tablet by mouth daily Yes Rj Celaya MD   oxyCODONE (ROXICODONE) 20 MG immediate release tablet Take 1 tablet by mouth in the morning, at noon, in the evening, and at bedtime. 4 times a day Yes Rj Celaya MD   testosterone cypionate (DEPOTESTOTERONE CYPIONATE) 100 MG/ML injection Inject 0.5 mLs into the muscle. Every 3 months Yes Rj Celaya MD   fish oil-omega-3 fatty acids 1000 MG capsule Take 1 capsule by mouth daily Yes Rj Celaya MD   Glucosamine-Chondroit-Biofl-Mn (GLUCOSAMINE CHONDROITIN COMPLX) CAPS Take 1 capsule by mouth daily.   Yes Rj Celaya MD   omeprazole (PRILOSEC) 40 MG delayed release capsule Take 1 capsule by mouth daily Yes Rj Celaya MD   Coenzyme Q10 (COQ10) 100 MG CAPS Take 200 mg by mouth daily Indications: 200-500 MG QD  Yes Rj Celaya MD   Multiple Vitamin (MULTIVITAMIN PO) Take 1 tablet by mouth daily.   Yes Rj Celaya MD   CINNAMON PO Take 1 capsule by mouth daily  Yes Rj Celaya MD         Allergies:Carvedilol, Codeine, and Imdur [isosorbide nitrate]     [x] Medications and dosages reviewed.    ROS:  [x]Full ROS obtained and negative except as mentioned in HPI      Physical Examination:     /64 (Site: Left Upper Arm, Position: Sitting, Cuff Size: Large Adult)   Pulse 71   Ht 1.651 m (5' 5\")   Wt 101.6 kg (224 lb)   SpO2 95%   BMI 37.28 kg/m²       GENERAL: Well developed, well nourished, No acute distress  NEUROLOGICAL: Alert and oriented  PSYCH: Calm affect  SKIN: Warm and dry, no visible rash  HEENT: Normocephalic, Sclera non-icteric, mucus membranes moist  NECK: supple, JVP normal  CAROTID: Normal upstroke, no bruits  CARDIAC: Normal PMI, regular rate and rhythm, normal S1S2, No murmur,

## 2025-03-04 NOTE — PATIENT INSTRUCTIONS
Farxiga is a good medication for the heart.    No medication changes.    Call the office for any new, worsening, or concerning symptoms.    If you  ever need surgery, you will need a stress test first.

## 2025-03-17 ENCOUNTER — OFFICE VISIT (OUTPATIENT)
Dept: ORTHOPEDIC SURGERY | Age: 80
End: 2025-03-17
Payer: MEDICARE

## 2025-03-17 DIAGNOSIS — M25.561 RIGHT KNEE PAIN, UNSPECIFIED CHRONICITY: Primary | ICD-10-CM

## 2025-03-17 PROCEDURE — 20610 DRAIN/INJ JOINT/BURSA W/O US: CPT | Performed by: PHYSICIAN ASSISTANT

## 2025-03-17 NOTE — PROGRESS NOTES
Joint Injection: risks and benefits were discussed with the patient, after preparation of the injection site with alcohol, HYALURONIC ACID (Euflexxa 20mg) was injected into the RIGHT KNEE joint for arthritis. The procedure was tolerated well without adverse reaction. The patient was counseled on potential reactions to the injection and given information on follow up and when to seek medical attention.     The patient will return in one week for their next injection.     1/3    Will CAROLYNE Franco

## 2025-03-24 ENCOUNTER — HOSPITAL ENCOUNTER (OUTPATIENT)
Age: 80
Discharge: HOME | End: 2025-03-24
Payer: MEDICARE

## 2025-03-24 ENCOUNTER — OFFICE VISIT (OUTPATIENT)
Dept: ORTHOPEDIC SURGERY | Age: 80
End: 2025-03-24
Payer: MEDICARE

## 2025-03-24 DIAGNOSIS — M17.11 PRIMARY OSTEOARTHRITIS OF RIGHT KNEE: Primary | ICD-10-CM

## 2025-03-24 LAB — PSA SERPL DL<=0.01 NG/ML-MCNC: 7.35 NG/ML (ref 0–4)

## 2025-03-24 PROCEDURE — 84403 ASSAY OF TOTAL TESTOSTERONE: CPT

## 2025-03-24 PROCEDURE — 36415 COLL VENOUS BLD VENIPUNCTURE: CPT

## 2025-03-24 PROCEDURE — 84153 ASSAY OF PSA TOTAL: CPT

## 2025-03-24 PROCEDURE — 20610 DRAIN/INJ JOINT/BURSA W/O US: CPT | Performed by: PHYSICIAN ASSISTANT

## 2025-03-24 NOTE — PROGRESS NOTES
Joint Injection: risks and benefits were discussed with the patient, after preparation of the injection site with alcohol, HYALURONIC ACID (Euflexxa 20mg) was injected into the RIGHT KNEE joint for arthritis. The procedure was tolerated well without adverse reaction. The patient was counseled on potential reactions to the injection and given information on follow up and when to seek medical attention.     The patient will return in one week for their next injection.     2/3    Will CAROLYNE Franco

## 2025-03-27 LAB — TESTOST SERPL-MCNC: 730 NG/DL (ref 193–740)

## 2025-03-28 NOTE — PROGRESS NOTES
(PRILOSEC) 40 MG delayed release capsule Take 1 capsule by mouth daily   Yes Rj Celaya MD   Coenzyme Q10 (COQ10) 100 MG CAPS Take 200 mg by mouth daily Indications: 200-500 MG QD    Yes Rj Celaya MD   Multiple Vitamin (MULTIVITAMIN PO) Take 1 tablet by mouth daily.     Yes Rj Celaya MD   CINNAMON PO Take 1 capsule by mouth daily    Yes Rj Celaya MD   methocarbamol (ROBAXIN) 500 MG tablet 1 tablet as needed  Patient not taking: Reported on 4/3/2025    Rj Celaya MD     Social History:   reports that he has quit smoking. His smoking use included cigarettes. He started smoking about 20 years ago. He has a 20.1 pack-year smoking history. He has never been exposed to tobacco smoke. He has never used smokeless tobacco. He reports that he does not drink alcohol and does not use drugs.   Family History:Reviewed. Denies family history of sudden cardiac death, arrhythmia, premature CAD  family history includes Cancer in his sister; Heart Attack (age of onset: 65) in his father; Heart Disease in his father and mother; Kidney Disease in his mother.     Review of System:  Full ROS obtained and negative except as mentioned in HPI  Physical Examination:  /74   Pulse 70   Ht 1.651 m (5' 5\")   Wt 102.6 kg (226 lb 3.2 oz)   SpO2 94%   BMI 37.64 kg/m²      Constitutional: Oriented. No distress.   Head: Normocephalic and atraumatic.   Mouth/Throat: Oropharynx is clear and moist.   Eyes: Conjunctivae normal. EOM are normal.   Neck: Neck supple. No rigidity. No JVD present.    Cardiovascular: Normal rate, regular rhythm, S1&S2.    Pulmonary/Chest: Bilateral respiratory sounds. No wheezes, No rhonchi.    Abdominal: Soft. Bowel sounds present. No distension, No tenderness.   Musculoskeletal: No tenderness. No edema    Lymphadenopathy: Has no cervical adenopathy.   Neurological: Alert and oriented. Cranial nerve appears intact, No Gross deficit   Skin: Skin is warm and

## 2025-03-31 ENCOUNTER — OFFICE VISIT (OUTPATIENT)
Dept: ORTHOPEDIC SURGERY | Age: 80
End: 2025-03-31

## 2025-03-31 DIAGNOSIS — M17.11 PRIMARY OSTEOARTHRITIS OF RIGHT KNEE: Primary | ICD-10-CM

## 2025-03-31 NOTE — PROGRESS NOTES
Joint Injection: risks and benefits were discussed with the patient, after preparation of the injection site with alcohol, HYALURONIC ACID (Euflexxa 20mg) was injected into the RIGHT KNEE joint for arthritis. The procedure was tolerated well without adverse reaction. The patient was counseled on potential reactions to the injection and given information on follow up and when to seek medical attention. The patient will monitor how long relief persists.    I discussed with the patient that we can perform these every 6 months he will schedule a follow-up and call prior to that appointment so that we can reorder the medication at that time.    3/3    Will CAROLYNE Franco

## 2025-04-03 ENCOUNTER — OFFICE VISIT (OUTPATIENT)
Dept: CARDIOLOGY CLINIC | Age: 80
End: 2025-04-03
Payer: MEDICARE

## 2025-04-03 VITALS
OXYGEN SATURATION: 94 % | WEIGHT: 226.2 LBS | DIASTOLIC BLOOD PRESSURE: 74 MMHG | HEIGHT: 65 IN | SYSTOLIC BLOOD PRESSURE: 120 MMHG | BODY MASS INDEX: 37.69 KG/M2 | HEART RATE: 70 BPM

## 2025-04-03 DIAGNOSIS — I48.0 PAROXYSMAL ATRIAL FIBRILLATION (HCC): Primary | Chronic | ICD-10-CM

## 2025-04-03 DIAGNOSIS — I10 HYPERTENSION, UNSPECIFIED TYPE: ICD-10-CM

## 2025-04-03 PROCEDURE — 1159F MED LIST DOCD IN RCRD: CPT

## 2025-04-03 PROCEDURE — G8427 DOCREV CUR MEDS BY ELIG CLIN: HCPCS

## 2025-04-03 PROCEDURE — G8417 CALC BMI ABV UP PARAM F/U: HCPCS

## 2025-04-03 PROCEDURE — 93000 ELECTROCARDIOGRAM COMPLETE: CPT

## 2025-04-03 PROCEDURE — G2211 COMPLEX E/M VISIT ADD ON: HCPCS

## 2025-04-03 PROCEDURE — 3078F DIAST BP <80 MM HG: CPT

## 2025-04-03 PROCEDURE — 1036F TOBACCO NON-USER: CPT

## 2025-04-03 PROCEDURE — 1123F ACP DISCUSS/DSCN MKR DOCD: CPT

## 2025-04-03 PROCEDURE — 3074F SYST BP LT 130 MM HG: CPT

## 2025-04-03 PROCEDURE — 99214 OFFICE O/P EST MOD 30 MIN: CPT

## 2025-04-09 ENCOUNTER — OFFICE VISIT (OUTPATIENT)
Dept: PULMONOLOGY | Age: 80
End: 2025-04-09
Payer: MEDICARE

## 2025-04-09 VITALS
SYSTOLIC BLOOD PRESSURE: 126 MMHG | BODY MASS INDEX: 38.12 KG/M2 | HEIGHT: 65 IN | OXYGEN SATURATION: 94 % | DIASTOLIC BLOOD PRESSURE: 70 MMHG | HEART RATE: 74 BPM | WEIGHT: 228.8 LBS

## 2025-04-09 DIAGNOSIS — E66.812 CLASS 2 SEVERE OBESITY DUE TO EXCESS CALORIES WITH SERIOUS COMORBIDITY AND BODY MASS INDEX (BMI) OF 38.0 TO 38.9 IN ADULT: ICD-10-CM

## 2025-04-09 DIAGNOSIS — I10 ESSENTIAL HYPERTENSION, BENIGN: Chronic | ICD-10-CM

## 2025-04-09 DIAGNOSIS — G47.33 OSA (OBSTRUCTIVE SLEEP APNEA): Primary | Chronic | ICD-10-CM

## 2025-04-09 DIAGNOSIS — I48.0 PAROXYSMAL ATRIAL FIBRILLATION (HCC): Chronic | ICD-10-CM

## 2025-04-09 DIAGNOSIS — I50.32 CHRONIC DIASTOLIC HEART FAILURE (HCC): ICD-10-CM

## 2025-04-09 DIAGNOSIS — E66.01 CLASS 2 SEVERE OBESITY DUE TO EXCESS CALORIES WITH SERIOUS COMORBIDITY AND BODY MASS INDEX (BMI) OF 38.0 TO 38.9 IN ADULT: ICD-10-CM

## 2025-04-09 PROCEDURE — G2211 COMPLEX E/M VISIT ADD ON: HCPCS | Performed by: NURSE PRACTITIONER

## 2025-04-09 PROCEDURE — G8427 DOCREV CUR MEDS BY ELIG CLIN: HCPCS | Performed by: NURSE PRACTITIONER

## 2025-04-09 PROCEDURE — G8417 CALC BMI ABV UP PARAM F/U: HCPCS | Performed by: NURSE PRACTITIONER

## 2025-04-09 PROCEDURE — 3074F SYST BP LT 130 MM HG: CPT | Performed by: NURSE PRACTITIONER

## 2025-04-09 PROCEDURE — 1160F RVW MEDS BY RX/DR IN RCRD: CPT | Performed by: NURSE PRACTITIONER

## 2025-04-09 PROCEDURE — 99214 OFFICE O/P EST MOD 30 MIN: CPT | Performed by: NURSE PRACTITIONER

## 2025-04-09 PROCEDURE — 1123F ACP DISCUSS/DSCN MKR DOCD: CPT | Performed by: NURSE PRACTITIONER

## 2025-04-09 PROCEDURE — 1159F MED LIST DOCD IN RCRD: CPT | Performed by: NURSE PRACTITIONER

## 2025-04-09 PROCEDURE — 3078F DIAST BP <80 MM HG: CPT | Performed by: NURSE PRACTITIONER

## 2025-04-09 PROCEDURE — 1036F TOBACCO NON-USER: CPT | Performed by: NURSE PRACTITIONER

## 2025-04-09 ASSESSMENT — SLEEP AND FATIGUE QUESTIONNAIRES
HOW LIKELY ARE YOU TO NOD OFF OR FALL ASLEEP WHEN YOU ARE A PASSENGER IN A CAR FOR AN HOUR WITHOUT A BREAK: SLIGHT CHANCE OF DOZING
HOW LIKELY ARE YOU TO NOD OFF OR FALL ASLEEP WHILE SITTING INACTIVE IN A PUBLIC PLACE: SLIGHT CHANCE OF DOZING
HOW LIKELY ARE YOU TO NOD OFF OR FALL ASLEEP WHILE SITTING QUIETLY AFTER LUNCH WITHOUT ALCOHOL: SLIGHT CHANCE OF DOZING
ESS TOTAL SCORE: 9
HOW LIKELY ARE YOU TO NOD OFF OR FALL ASLEEP WHILE WATCHING TV: MODERATE CHANCE OF DOZING
HOW LIKELY ARE YOU TO NOD OFF OR FALL ASLEEP IN A CAR, WHILE STOPPED FOR A FEW MINUTES IN TRAFFIC: WOULD NEVER DOZE
HOW LIKELY ARE YOU TO NOD OFF OR FALL ASLEEP WHILE SITTING AND TALKING TO SOMEONE: WOULD NEVER DOZE
HOW LIKELY ARE YOU TO NOD OFF OR FALL ASLEEP WHILE LYING DOWN TO REST IN THE AFTERNOON WHEN CIRCUMSTANCES PERMIT: MODERATE CHANCE OF DOZING
HOW LIKELY ARE YOU TO NOD OFF OR FALL ASLEEP WHILE SITTING AND READING: MODERATE CHANCE OF DOZING

## 2025-04-09 NOTE — ASSESSMENT & PLAN NOTE
Chronic-Stable: Reviewed and analyzed results of physiologic download from patient's machine and reviewed with patient.  Supplies and parts as needed for his machine.  These are medically necessary.  Limit caffeine use after 3pm. Based on the analyzed data will continue with current settings. Stable on his machine at current settings, getting benefit from the use, and having minimal side effects.  Will send prescription to medical services company to have them evaluate patient's machine due to noise.  Encouraged him to continue working on mask fit and comfort.  Will see him back in 6 months.  Encouraged him to contact the office any questions or concerns

## 2025-04-09 NOTE — PROGRESS NOTES
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 He reports a noise coming from the machine when he has his mask on unrelated to mask leak     Comfort Settings: Ramp Pressure: 5 cmH2O  Ramp time: 15 min  Flex/EPR - 3 full time                                  For ResMed Bileve (TiMax-4 sec   TiMin- 0.2 sec)     Humidifier: [] Heated ()        [x] Water chamber replacement ()/ 1 per 6 months        Mask:   [] Nasal () /1 per 3 months [] Full Face () /1 per 3 months   [] Patient choice -Size and fit mask [] Patient Choice - Size and fit mask   [] Dispense: [] Dispense:   [] Headgear () / 1 per 3 months [] Headgear () / 1 per 3 months   [] Replacement Nasal Cushion ()/2 per month [] Interface Replacement ()/1 per month   [] Replacement Nasal Pillows ()/2 per month         Tubing: [x] Heated ()/1 per 3 months    [] Standard ()/1 per 3 months [] Other:           Filters: [x] Non-disposable ()/1 per 6 months     [x] Ultra-Fine, Disposable ()/2 per month        Miscellaneous: [] Chin Strap ()/ 1 per 6 months [] O2 bleed-in:        LPM   [] Oxymetry on CPAP/Bilevel []  Other:         Start Order Date: 04/09/25    MEDICAL JUSTIFICATION:  I, the undersigned, certify that the above prescribed supplies are medically necessary for this patient’s wellbeing.  In my opinion, the supplies are both reasonable and necessary in reference to accepted standards of medicalpractice in treatment of this patient’s condition.    KATHERIN SHOEMAKER NP    NPI: [id number]       Order Signed Date: 04/09/25  [facility]

## 2025-04-09 NOTE — PROGRESS NOTES
Unstable Housing in the Last Year: No       Current Outpatient Medications   Medication Instructions    ACETYLCARNITINE HCL PO 1 tablet, DAILY    apixaban (ELIQUIS) 5 mg, 2 TIMES DAILY    atorvastatin (LIPITOR) 10 MG tablet TAKE 1 TABLET BY MOUTH ONE TIME A DAY     celecoxib (CELEBREX) 200 mg, DAILY    Cholecalciferol (VITAMIN D3) 5000 UNITS TABS 1 tablet, DAILY    CINNAMON PO 1 capsule, DAILY    CoQ10 200 mg, DAILY    CRANBERRY EXTRACT PO 1 capsule, DAILY    dilTIAZem (TIAZAC) 240 mg, Oral, DAILY    fexofenadine (ALLEGRA) 180 mg, DAILY    fish oil-omega-3 fatty acids 1 g, DAILY    furosemide (LASIX) 80 MG tablet 1 tablet daily    Glucosamine-Chondroit-Biofl-Mn (GLUCOSAMINE CHONDROITIN COMPLX) CAPS 1 capsule, DAILY    hydrALAZINE (APRESOLINE) 75 mg, Oral, 2 TIMES DAILY    lisinopril (PRINIVIL;ZESTRIL) 20 mg, DAILY    methocarbamol (ROBAXIN) 500 mg, PRN    Multiple Vitamin (MULTIVITAMIN PO) 1 tablet, DAILY    omeprazole (PRILOSEC) 40 mg, DAILY    oxyCODONE (ROXICODONE) 20 mg, 4 times daily    Probiotic Product (PROBIOTIC ADVANCED PO) 1 capsule, DAILY    Resveratrol 100 MG CAPS 1 capsule, DAILY    terazosin (HYTRIN) 5 mg, Oral, NIGHTLY    testosterone cypionate (DEPOTESTOTERONE CYPIONATE) 50 mg    Turmeric 400 MG CAPS 1 capsule, DAILY    UNABLE TO FIND circulation and vein support        Objective   Vitals:  Weight BMI   Wt Readings from Last 3 Encounters:   04/09/25 103.8 kg (228 lb 12.8 oz)   04/03/25 102.6 kg (226 lb 3.2 oz)   03/04/25 101.6 kg (224 lb)    Body mass index is 38.07 kg/m².     BP HR SaO2   BP Readings from Last 3 Encounters:   04/09/25 126/70   04/03/25 120/74   03/04/25 132/64    Pulse Readings from Last 3 Encounters:   04/09/25 74   04/03/25 70   03/04/25 71    SpO2 Readings from Last 3 Encounters:   04/09/25 94%   04/03/25 94%   03/04/25 95%         Electronically signed by NEO Batista on 4/9/2025 at 3:57 PM

## 2025-04-09 NOTE — PROGRESS NOTES
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) [] CSA (G47.31) [] Apnea (G47.30)   Length of Need: [x] 15 Months [] 99 Months [] Other:   Machine (LAYO!): [] Respironics Dream Station      Auto [] ResMed AirSense     Auto [] Other:     []  CPAP () [] Bilevel ()   Mode: [] Auto [] Spontaneous    Mode: [] Auto [] Spontaneous             Comfort Settings:      Humidifier: [] Heated ()        [x] Water chamber replacement ()/ 1 per 6 months        Mask:   [] Nasal () /1 per 3 months [x] Full Face () /1 per 3 months   [] Patient choice -Size and fit mask [x] Patient Choice - Size and fit mask   [] Dispense: [] Dispense:   [] Headgear () / 1 per 3 months [x] Headgear () / 1 per 3 months   [] Replacement Nasal Cushion ()/2 per month [x] Interface Replacement ()/1 per month   [] Replacement Nasal Pillows ()/2 per month         Tubing: [x] Heated ()/1 per 3 months    [] Standard ()/1 per 3 months [] Other:           Filters: [x] Non-disposable ()/1 per 6 months     [x] Ultra-Fine, Disposable ()/2 per month        Miscellaneous: [] Chin Strap ()/ 1 per 6 months [] O2 bleed-in:        LPM   [] Oxymetry on CPAP/Bilevel []  Other:         Start Order Date: 04/09/25    MEDICAL JUSTIFICATION:  I, the undersigned, certify that the above prescribed supplies are medically necessary for this patient’s wellbeing.  In my opinion, the supplies are both reasonable and necessary in reference to accepted standards of medicalpractice in treatment of this patient’s condition.    KATHERIN SHOEMAKER NP    NPI: [id number]       Order Signed Date: 04/09/25  [facility]  Pulmonary, Sleep, and Critical Care    Pulmonary, Sleep, and Critical Care  Formerly [facility], [facility] [address]                          [address] [place] [address] [address]  [place] [address]                                    [place] [address]  Phone: [phone]    Fax:

## 2025-04-17 ENCOUNTER — TELEPHONE (OUTPATIENT)
Dept: CARDIOLOGY CLINIC | Age: 80
End: 2025-04-17

## 2025-04-17 ENCOUNTER — TELEPHONE (OUTPATIENT)
Dept: ORTHOPEDIC SURGERY | Age: 80
End: 2025-04-17

## 2025-04-17 NOTE — TELEPHONE ENCOUNTER
I spoke with pt . He stated that SOB more than normal.with exertion. He stated that after climbing a few stairs he is SOB. Denies any pain ar diaphoresis.

## 2025-04-17 NOTE — TELEPHONE ENCOUNTER
Patient has had noticeable increase in SOB over the past couple of days.  Denies CP, denies fatigue.  Unable to give BP or HR reading.  Please call to advise, [phone]

## 2025-04-17 NOTE — TELEPHONE ENCOUNTER
Spoke to patient. He has noticed a little more SOB with stairs or walking over the past week. He has no chest pain. No weight gain or swelling. His BP and HR are controlled. His sleep apnea has been better controlled. He has had no changes in meds. He doesn't think it is bad enough to come in for an appt.    Per [facility]- have him keep a close eye on it. If worsening or develops any associated symptoms with it call back and will need an appt.

## 2025-05-14 ENCOUNTER — TELEPHONE (OUTPATIENT)
Dept: ORTHOPEDIC SURGERY | Age: 80
End: 2025-05-14

## 2025-05-14 NOTE — TELEPHONE ENCOUNTER
----- Message from Ian BOWLES sent at 5/14/2025 11:27 AM EDT -----  Regarding: Specialty Message to Provider  Specialty Message to Provider    Relationship to Patient: Self     Patient Message: PATIENT CALLED IN TO SEE IF HE CAN SEE AN DIFF DATE ON MAY 19TH IN THE AFTERNOON FOR HIS INJECTION. REQ A CALL BACK.. PLEASE ADVISE   --------------------------------------------------------------------------------------------------------------------------    Call Back Information: OK to leave message on voicemail  Preferred Call Back Number: [phone]

## 2025-05-14 NOTE — TELEPHONE ENCOUNTER
Spoke with patient, is unable to come in for any appointment that is not in the afternoon at [place]. Due to Dr. Cox being out of office, he will be scheduling with is PCP.

## 2025-05-14 NOTE — TELEPHONE ENCOUNTER
Lvm asking patient to return call to reschedule appt on 05/19/25 to earlier in the day or switch to another day.

## 2025-07-22 ENCOUNTER — TELEPHONE (OUTPATIENT)
Dept: CARDIOLOGY CLINIC | Age: 80
End: 2025-07-22

## 2025-07-24 DIAGNOSIS — Z01.810 PRE-OPERATIVE CARDIOVASCULAR EXAMINATION: Primary | ICD-10-CM

## 2025-07-24 DIAGNOSIS — I25.10 ATHEROSCLEROSIS OF NATIVE CORONARY ARTERY OF NATIVE HEART WITHOUT ANGINA PECTORIS: Chronic | ICD-10-CM

## 2025-08-08 ENCOUNTER — OFFICE VISIT (OUTPATIENT)
Dept: ORTHOPEDIC SURGERY | Age: 80
End: 2025-08-08

## 2025-08-08 VITALS — BODY MASS INDEX: 38.13 KG/M2 | HEIGHT: 65 IN | WEIGHT: 228.84 LBS

## 2025-08-08 DIAGNOSIS — M17.11 PRIMARY OSTEOARTHRITIS OF RIGHT KNEE: Primary | ICD-10-CM

## 2025-08-08 RX ORDER — LIDOCAINE HYDROCHLORIDE 10 MG/ML
4 INJECTION, SOLUTION INFILTRATION; PERINEURAL ONCE
Status: COMPLETED | OUTPATIENT
Start: 2025-08-08 | End: 2025-08-08

## 2025-08-08 RX ORDER — TRIAMCINOLONE ACETONIDE 40 MG/ML
40 INJECTION, SUSPENSION INTRA-ARTICULAR; INTRAMUSCULAR ONCE
Status: COMPLETED | OUTPATIENT
Start: 2025-08-08 | End: 2025-08-08

## 2025-08-08 RX ADMIN — LIDOCAINE HYDROCHLORIDE 4 ML: 10 INJECTION, SOLUTION INFILTRATION; PERINEURAL at 15:01

## 2025-08-08 RX ADMIN — TRIAMCINOLONE ACETONIDE 40 MG: 40 INJECTION, SUSPENSION INTRA-ARTICULAR; INTRAMUSCULAR at 15:01

## 2025-08-11 PROBLEM — M17.11 OSTEOARTHRITIS OF RIGHT KNEE: Status: ACTIVE | Noted: 2025-08-08

## 2025-08-11 RX ORDER — CELECOXIB 100 MG/1
400 CAPSULE ORAL ONCE
OUTPATIENT
Start: 2025-08-11 | End: 2025-08-11

## 2025-08-11 RX ORDER — ACETAMINOPHEN 325 MG/1
1000 TABLET ORAL ONCE
OUTPATIENT
Start: 2025-08-11 | End: 2025-08-11

## 2025-08-11 RX ORDER — SODIUM CHLORIDE 9 MG/ML
INJECTION, SOLUTION INTRAVENOUS PRN
OUTPATIENT
Start: 2025-08-11

## 2025-08-11 RX ORDER — SODIUM CHLORIDE 0.9 % (FLUSH) 0.9 %
5-40 SYRINGE (ML) INJECTION EVERY 12 HOURS SCHEDULED
OUTPATIENT
Start: 2025-08-11

## 2025-08-11 RX ORDER — SODIUM CHLORIDE 0.9 % (FLUSH) 0.9 %
5-40 SYRINGE (ML) INJECTION PRN
OUTPATIENT
Start: 2025-08-11